# Patient Record
Sex: MALE | Race: WHITE | NOT HISPANIC OR LATINO | Employment: OTHER | URBAN - NONMETROPOLITAN AREA
[De-identification: names, ages, dates, MRNs, and addresses within clinical notes are randomized per-mention and may not be internally consistent; named-entity substitution may affect disease eponyms.]

---

## 2017-03-30 ENCOUNTER — OFFICE VISIT (OUTPATIENT)
Dept: CARDIOLOGY | Facility: CLINIC | Age: 67
End: 2017-03-30

## 2017-03-30 VITALS
HEIGHT: 67 IN | WEIGHT: 164.4 LBS | HEART RATE: 85 BPM | OXYGEN SATURATION: 93 % | SYSTOLIC BLOOD PRESSURE: 107 MMHG | DIASTOLIC BLOOD PRESSURE: 72 MMHG | BODY MASS INDEX: 25.8 KG/M2

## 2017-03-30 DIAGNOSIS — R07.9 CHEST PAIN, UNSPECIFIED TYPE: Primary | ICD-10-CM

## 2017-03-30 DIAGNOSIS — R00.2 PALPITATIONS: ICD-10-CM

## 2017-03-30 DIAGNOSIS — R06.02 SHORTNESS OF BREATH: ICD-10-CM

## 2017-03-30 PROCEDURE — 99204 OFFICE O/P NEW MOD 45 MIN: CPT | Performed by: PHYSICIAN ASSISTANT

## 2017-03-30 PROCEDURE — 93000 ELECTROCARDIOGRAM COMPLETE: CPT | Performed by: PHYSICIAN ASSISTANT

## 2017-03-30 RX ORDER — DOCUSATE SODIUM 100 MG/1
200 CAPSULE, LIQUID FILLED ORAL 2 TIMES DAILY
COMMUNITY
End: 2020-05-08

## 2017-03-30 RX ORDER — HYDROCODONE BITARTRATE AND ACETAMINOPHEN 7.5; 325 MG/1; MG/1
1 TABLET ORAL 3 TIMES DAILY
COMMUNITY

## 2017-03-30 RX ORDER — LISINOPRIL AND HYDROCHLOROTHIAZIDE 20; 12.5 MG/1; MG/1
1 TABLET ORAL DAILY
COMMUNITY
End: 2017-07-04 | Stop reason: HOSPADM

## 2017-03-30 RX ORDER — ATORVASTATIN CALCIUM 20 MG/1
20 TABLET, FILM COATED ORAL DAILY
Status: ON HOLD | COMMUNITY
End: 2017-07-04

## 2017-03-30 RX ORDER — CLOPIDOGREL BISULFATE 75 MG/1
75 TABLET ORAL DAILY
COMMUNITY
End: 2017-06-08 | Stop reason: ALTCHOICE

## 2017-03-30 NOTE — PROGRESS NOTES
Subjective   Aly Gabriel is a 67 y.o. male     Chief Complaint   Patient presents with   • Shortness of Breath     presents as a new patient        HPI    Problem list  1. 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 Hypertension  4. Dyslipidemia  5. Cervicalgia    Patient is a 67-year-old male that presents today to [facility] care. Recently was in the emergency room because of hypotension. Workup was benign but there was some concern about possibility of atrial fibrillation. Patient was discharged follow-up with cardiology and family practice as an outpatient.    He has been expressing chest pain according to the left. He has been having discomfort in his chest. Detailed history cannot be obtained. He has had shortness of breath as well but apparently no PND or orthopnea. Palpitations but no dizziness presyncope or syncope. Otherwise voices no complaints      Current Outpatient Prescriptions   Medication Sig Dispense Refill   • atorvastatin (LIPITOR) 20 MG tablet Take 20 mg by mouth Daily.     • clopidogrel (PLAVIX) 75 MG tablet Take 75 mg by mouth Daily.     • docusate sodium (COLACE) 100 MG capsule Take 200 mg by mouth 2 (Two) Times a Day.     • HYDROcodone-acetaminophen (NORCO) 5-325 MG per tablet Take 1 tablet by mouth Every 6 (Six) Hours As Needed.     • lisinopril-hydrochlorothiazide (PRINZIDE,ZESTORETIC) 20-12.5 MG per tablet Take 1 tablet by mouth 2 (Two) Times a Day.       No current facility-administered medications for this visit.        Review of patient's allergies indicates no known allergies.    Past Medical History:   Diagnosis Date   • Hyperlipidemia    • Hypertension    • Stroke        Social History     Social History   • Marital status:      Spouse name: N/A   • Number of children: N/A   • Years  "of education: N/A     Occupational History   • Not on file.     Social History Main Topics   • Smoking status: Former Smoker   • Smokeless tobacco: Never Used   • Alcohol use No   • Drug use: No   • Sexual activity: Defer     Other Topics Concern   • Not on file     Social History Narrative   • No narrative on file       @[facility]@    Review of Systems   Constitutional: Positive for fatigue.   HENT: Positive for sinus pressure.    Eyes: Positive for visual disturbance (glasses).   Respiratory: Positive for shortness of breath.    Cardiovascular: Positive for chest pain and palpitations. Leg swelling: ankles.   Gastrointestinal: Positive for constipation.   Endocrine: Negative.    Genitourinary: Negative.    Musculoskeletal: Positive for arthralgias.   Skin: Negative.    Allergic/Immunologic: Positive for environmental allergies.   Neurological: Positive for headaches.   Hematological: Bruises/bleeds easily.   Psychiatric/Behavioral: The patient is nervous/anxious.        Objective     /72 (BP Location: Left arm, Patient Position: Sitting)  Pulse 85  Ht 67\" (170.2 cm)  Wt 164 lb 6.4 oz (74.6 kg)  SpO2 93%  BMI 25.75 kg/m2    Lab Results (most recent)     None          Physical Exam   Constitutional: He is oriented to person, place, and time. He appears well-developed and well-nourished. No distress.   HENT:   Head: Normocephalic and atraumatic.   Eyes: EOM are normal. Pupils are equal, round, and reactive to light.   Neck: No JVD present.   Cardiovascular: Normal rate, regular rhythm and normal heart sounds.  Exam reveals no gallop and no friction rub.    No murmur heard.  Pulmonary/Chest: Effort normal and breath sounds normal. No respiratory distress. He has no wheezes. He has no rales. He exhibits no tenderness.   Abdominal: Soft. He exhibits no distension. There is no tenderness.   Musculoskeletal: Normal range of motion. He exhibits no edema.   Neurological: He is alert and oriented to person, " place, and time. No cranial nerve deficit.   Skin: Skin is warm and dry. No rash noted. No erythema. No pallor.   Psychiatric: He has a normal mood and affect. His behavior is normal.   Nursing note and vitals reviewed.      Procedure     ECG 12 Lead  Date/Time: 3/30/2017 1:23 PM  Performed by: NEHEMIAH BURTON  Authorized by: NEHEMIAH BURTON   Comments: Chest pain  Shortness of breath  Palpitations    EKG demonstrates sinus rhythm with artifact at 10 8 bpm, no acute ST changes                 Assessment/Plan     Problems Addressed this Visit        Cardiovascular and Mediastinum    Palpitations    Relevant Orders    ECG 12 Lead    Stress Test With Myocardial Perfusion One Day    Adult Transthoracic Echo Complete    Cardiac Event Monitor       Respiratory    Shortness of breath    Relevant Orders    ECG 12 Lead    Stress Test With Myocardial Perfusion One Day    Adult Transthoracic Echo Complete    Cardiac Event Monitor       Nervous and Auditory    Chest pain - Primary    Relevant Orders    ECG 12 Lead    Stress Test With Myocardial Perfusion One Day    Adult Transthoracic Echo Complete    Cardiac Event Monitor              Recommendations  1. Because of patient's symptoms and history of vascular disease, we will like to schedule for an ischemia assessment. Lexiscan stress test will be performed. Echocardiogram to evaluate systolic and diastolic function. I would also like to obtain a 2 week event monitor to look for any evidence of atrial fibrillation or flutter especially with history of stroke.  2. We will see him back for follow-up of above testing. Follow-up with primary as scheduled

## 2017-04-11 ENCOUNTER — APPOINTMENT (OUTPATIENT)
Dept: CARDIOLOGY | Facility: HOSPITAL | Age: 67
End: 2017-04-11

## 2017-05-01 ENCOUNTER — HOSPITAL ENCOUNTER (OUTPATIENT)
Dept: CARDIOLOGY | Facility: HOSPITAL | Age: 67
Discharge: HOME | End: 2017-05-01

## 2017-05-01 ENCOUNTER — OUTSIDE FACILITY SERVICE (OUTPATIENT)
Dept: CARDIOLOGY | Facility: CLINIC | Age: 67
End: 2017-05-01

## 2017-05-01 LAB
MAXIMAL PREDICTED HEART RATE: 152 BPM
STRESS TARGET HR: 129 BPM

## 2017-05-01 PROCEDURE — 93306 TTE W/DOPPLER COMPLETE: CPT

## 2017-05-01 PROCEDURE — 0 TECHNETIUM SESTAMIBI: Performed by: INTERNAL MEDICINE

## 2017-05-01 PROCEDURE — 78452 HT MUSCLE IMAGE SPECT MULT: CPT | Performed by: INTERNAL MEDICINE

## 2017-05-01 PROCEDURE — 93018 CV STRESS TEST I&R ONLY: CPT | Performed by: INTERNAL MEDICINE

## 2017-05-01 PROCEDURE — 93017 CV STRESS TEST TRACING ONLY: CPT

## 2017-05-01 PROCEDURE — 25010000002 REGADENOSON 0.4 MG/5ML SOLUTION: Performed by: INTERNAL MEDICINE

## 2017-05-01 PROCEDURE — 93306 TTE W/DOPPLER COMPLETE: CPT | Performed by: INTERNAL MEDICINE

## 2017-05-01 PROCEDURE — A9500 TC99M SESTAMIBI: HCPCS | Performed by: INTERNAL MEDICINE

## 2017-05-01 PROCEDURE — 78452 HT MUSCLE IMAGE SPECT MULT: CPT

## 2017-05-01 RX ADMIN — Medication 1 DOSE: at 12:00

## 2017-05-01 RX ADMIN — REGADENOSON 0.4 MG: 0.08 INJECTION, SOLUTION INTRAVENOUS at 12:00

## 2017-05-08 ENCOUNTER — OFFICE VISIT (OUTPATIENT)
Dept: CARDIOLOGY | Facility: CLINIC | Age: 67
End: 2017-05-08

## 2017-05-08 VITALS
BODY MASS INDEX: 25.68 KG/M2 | OXYGEN SATURATION: 96 % | HEART RATE: 81 BPM | SYSTOLIC BLOOD PRESSURE: 148 MMHG | HEIGHT: 67 IN | WEIGHT: 163.6 LBS | DIASTOLIC BLOOD PRESSURE: 93 MMHG

## 2017-05-08 DIAGNOSIS — R06.02 SHORTNESS OF BREATH: ICD-10-CM

## 2017-05-08 DIAGNOSIS — R07.9 CHEST PAIN, UNSPECIFIED TYPE: Primary | ICD-10-CM

## 2017-05-08 DIAGNOSIS — R94.39 ABNORMAL STRESS TEST: ICD-10-CM

## 2017-05-08 PROCEDURE — 99214 OFFICE O/P EST MOD 30 MIN: CPT | Performed by: PHYSICIAN ASSISTANT

## 2017-05-08 RX ORDER — NITROGLYCERIN 0.4 MG/1
TABLET SUBLINGUAL
Qty: 100 TABLET | Refills: 11 | Status: SHIPPED | OUTPATIENT
Start: 2017-05-08 | End: 2019-04-10 | Stop reason: SDUPTHER

## 2017-05-26 ENCOUNTER — OUTSIDE FACILITY SERVICE (OUTPATIENT)
Dept: CARDIOLOGY | Facility: CLINIC | Age: 67
End: 2017-05-26

## 2017-05-26 PROCEDURE — 93458 L HRT ARTERY/VENTRICLE ANGIO: CPT | Performed by: INTERNAL MEDICINE

## 2017-06-01 ENCOUNTER — HOSPITAL ENCOUNTER (OUTPATIENT)
Dept: CARDIOLOGY | Facility: HOSPITAL | Age: 67
Discharge: HOME | End: 2017-06-01
Attending: THORACIC SURGERY (CARDIOTHORACIC VASCULAR SURGERY)

## 2017-06-01 DIAGNOSIS — Z98.890 HISTORY OF LEFT HEART CATHETERIZATION (LHC): ICD-10-CM

## 2017-06-08 ENCOUNTER — OFFICE VISIT (OUTPATIENT)
Dept: CARDIOLOGY | Facility: CLINIC | Age: 67
End: 2017-06-08

## 2017-06-08 ENCOUNTER — TELEPHONE (OUTPATIENT)
Dept: CARDIAC SURGERY | Facility: CLINIC | Age: 67
End: 2017-06-08

## 2017-06-08 VITALS
SYSTOLIC BLOOD PRESSURE: 122 MMHG | OXYGEN SATURATION: 96 % | DIASTOLIC BLOOD PRESSURE: 80 MMHG | BODY MASS INDEX: 25.55 KG/M2 | WEIGHT: 162.8 LBS | HEART RATE: 85 BPM | HEIGHT: 67 IN

## 2017-06-08 DIAGNOSIS — I25.10 CORONARY ARTERY DISEASE INVOLVING NATIVE CORONARY ARTERY OF NATIVE HEART WITHOUT ANGINA PECTORIS: Primary | ICD-10-CM

## 2017-06-08 DIAGNOSIS — R06.00 DYSPNEA, UNSPECIFIED TYPE: ICD-10-CM

## 2017-06-08 DIAGNOSIS — R07.9 CHEST PAIN, UNSPECIFIED TYPE: ICD-10-CM

## 2017-06-08 PROCEDURE — 99214 OFFICE O/P EST MOD 30 MIN: CPT | Performed by: PHYSICIAN ASSISTANT

## 2017-06-08 RX ORDER — ISOSORBIDE MONONITRATE 30 MG/1
30 TABLET, EXTENDED RELEASE ORAL EVERY MORNING
Qty: 30 TABLET | Refills: 11 | Status: SHIPPED | OUTPATIENT
Start: 2017-06-08 | End: 2017-07-04 | Stop reason: HOSPADM

## 2017-06-08 RX ORDER — RANOLAZINE 500 MG/1
500 TABLET, EXTENDED RELEASE ORAL 2 TIMES DAILY
Qty: 60 TABLET | Refills: 6 | Status: SHIPPED | OUTPATIENT
Start: 2017-06-08 | End: 2017-07-04 | Stop reason: HOSPADM

## 2017-06-08 NOTE — TELEPHONE ENCOUNTER
S/W Dr. Anthony regarding new patient referral from Dr. Ryan's office. Patient for CAD with Possible CABG, CATH report scanned in [facility]. Dr. Ryan's office sending CATH films from [facility].   Dr. Anthony okayed New Patient Appointment for 6/19/17 at 9:15am. Dr. Ryan's office is aware that Dr. Anthony is out of the office 6/12/17 and 6/19/17 is earliest appointment available.      JE Sorenson at Dr. Rayn's office made CT Surgery aware patient is having increased chest pain and Dr. Ryan's office instructed patient to come to [facility] to be evaluated by on call CT surgeon if chest pain persisted.     Called Patient no answer left message notifying Aly of his appointment with Dr. Anthony 6/19/17.   Left Message at 10:43am 6/8/17

## 2017-06-08 NOTE — PROGRESS NOTES
Problem list     Subjective   Aly Gabriel is a 67 y.o. male     Chief Complaint   Patient presents with   • Follow-up     presents as a follow up from a cath       HPI    Problem list  1. Three-vessel coronary artery disease  1.1 cardiac catheterization May 2017 because of inferobasal, diaphragmatic, and distal lateral ischemia demonstrating severe three-vessel disease with recommendations for mechanical revascularization. Patient is referred to Dr. Ronal Anthony  2. Preserved systolic function  3. 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 Hypertension  5. Dyslipidemia    Patient is a 67-year-old male that presents back to our office for follow-up. He recently had catheterization demonstrating three-vessel disease. He is here today for follow-up and for referral to CT surgery.    Patient complains of significant discomfort. History is difficult to be obtained because of stroke and residual deficits. He complains of chest discomfort and wife states that he has been having significant discomfort. He has mild dyspnea when exerting but not progressive dyspnea. Does not complain of PND or orthopnea. He does not describe palpitations and denies dizziness presyncope or syncope. Patient describes his main complaint being chest discomfort and describes it using C[place]ino sign. Otherwise voices no complaints    Outpatient Encounter Prescriptions as of 6/8/2017   Medication Sig Dispense Refill   • aspirin 81 MG tablet Take 1 tablet by mouth Daily. 30 tablet 11   • atorvastatin (LIPITOR) 20 MG tablet Take 20 mg by mouth Daily.     • docusate sodium (COLACE) 100 MG capsule Take 200 mg by mouth 2 (Two) Times a Day.     • HYDROcodone-acetaminophen (NORCO) 5-325 MG per tablet Take 1 tablet by mouth Every 6 (Six) Hours As Needed.     • lisinopril-hydrochlorothiazide  "(PRINZIDE,ZESTORETIC) 20-12.5 MG per tablet Take 1 tablet by mouth 2 (Two) Times a Day.     • nitroglycerin (NITROSTAT) 0.4 MG SL tablet 1 under the tongue as needed for angina, may repeat q5mins for up three doses 100 tablet 11   • isosorbide mononitrate (IMDUR) 30 MG 24 hr tablet Take 1 tablet by mouth Every Morning. 30 tablet 11   • ranolazine (RANEXA) 500 MG 12 hr tablet Take 1 tablet by mouth 2 (Two) Times a Day. 60 tablet 6   • [DISCONTINUED] clopidogrel (PLAVIX) 75 MG tablet Take 75 mg by mouth Daily.       No facility-administered encounter medications on file as of 6/8/2017.        Review of patient's allergies indicates no known allergies.    Past Medical History:   Diagnosis Date   • Hyperlipidemia    • Hypertension    • Stroke        Social History     Social History   • Marital status:      Spouse name: N/A   • Number of children: N/A   • Years of education: N/A     Occupational History   • Not on file.     Social History Main Topics   • Smoking status: Former Smoker   • Smokeless tobacco: Never Used   • Alcohol use No   • Drug use: No   • Sexual activity: Defer     Other Topics Concern   • Not on file     Social History Narrative       Family History   Problem Relation Age of Onset   • Heart disease Mother    • Heart disease Father        Review of Systems   Constitutional: Negative.    HENT: Negative.    Eyes: Positive for visual disturbance.   Respiratory: Positive for shortness of breath.    Cardiovascular: Positive for chest pain. Negative for palpitations and leg swelling.   Gastrointestinal: Positive for abdominal pain.   Endocrine: Negative.    Genitourinary: Negative.    Musculoskeletal: Positive for arthralgias.   Skin: Negative.    Allergic/Immunologic: Negative.    Neurological: Negative.    Hematological: Negative.    Psychiatric/Behavioral: Negative.        Objective     /80 (BP Location: Left arm, Patient Position: Sitting)  Pulse 85  Ht 67\" (170.2 cm)  Wt 162 lb 12.8 oz " (73.8 kg)  SpO2 96%  BMI 25.5 kg/m2    Lab Results (most recent)     None          Physical Exam   Constitutional: He is oriented to person, place, and time. He appears well-developed and well-nourished. No distress.   HENT:   Head: Normocephalic and atraumatic.   Eyes: EOM are normal. Pupils are equal, round, and reactive to light.   Neck: No JVD present.   Cardiovascular: Normal rate, regular rhythm and normal heart sounds.  Exam reveals no gallop and no friction rub.    No murmur heard.  Pulmonary/Chest: Effort normal and breath sounds normal. No respiratory distress. He has no wheezes. He has no rales. He exhibits no tenderness.   Abdominal: Soft.   Musculoskeletal: Normal range of motion. He exhibits no edema.   Neurological: He is alert and oriented to person, place, and time. No cranial nerve deficit.   Skin: Skin is warm and dry. No rash noted. No erythema. No pallor.   Psychiatric: He has a normal mood and affect. His behavior is normal.   Nursing note and vitals reviewed.      Procedure   Procedures       Assessment/Plan     Problems Addressed this Visit        Cardiovascular and Mediastinum    Coronary artery disease involving native coronary artery of native heart without angina pectoris - Primary    Relevant Medications    ranolazine (RANEXA) 500 MG 12 hr tablet    isosorbide mononitrate (IMDUR) 30 MG 24 hr tablet    Other Relevant Orders    Ambulatory Referral to Cardiothoracic Surgery       Respiratory    Dyspnea    Relevant Orders    Ambulatory Referral to Cardiothoracic Surgery       Nervous and Auditory    Chest pain    Relevant Orders    Ambulatory Referral to Cardiothoracic Surgery              Recommendation  1. Because of patient's 3 vessel disease, we had a catheterization films sent to Dr. Ronal Anthony. He is referred for possible mechanical revascularization. He was mentioned that if mechanical revascularization is not amenable, possible intervention will be performed  percutaneously.  2. Because of significant discomfort, I am adding antianginal therapy with Ranexa and isosorbide. He has nitroglycerin available for chest pain and any chest pain, as discussed with the patient, not resolved by nitroglycerin, he is to go to the ER.  3. They raise concern with me today about possibility of atrial fibrillation. On first arrival at our office there was an EKG performed which demonstrated significant artifact but on review of this EKG, do not appreciate atrial fibrillation. An event monitor was ordered to ensure that patient does not have this rhythm and patient refuses. We discussed about wanting to have this done because of history of stroke and with concern of atrial fibrillation, we would like to rule out that he is having this rhythm. Again he refuses aware of the risk of stroke.  4. We will see him back for follow-up after CT evaluation and surgery. Follow-up with primary as scheduled

## 2017-06-19 ENCOUNTER — OFFICE VISIT (OUTPATIENT)
Dept: CARDIAC SURGERY | Facility: CLINIC | Age: 67
End: 2017-06-19

## 2017-06-19 VITALS
BODY MASS INDEX: 25.93 KG/M2 | WEIGHT: 165.2 LBS | HEART RATE: 79 BPM | TEMPERATURE: 97.6 F | HEIGHT: 67 IN | SYSTOLIC BLOOD PRESSURE: 121 MMHG | DIASTOLIC BLOOD PRESSURE: 75 MMHG | OXYGEN SATURATION: 95 %

## 2017-06-19 DIAGNOSIS — I25.10 CORONARY ARTERY DISEASE INVOLVING NATIVE CORONARY ARTERY OF NATIVE HEART WITHOUT ANGINA PECTORIS: Primary | ICD-10-CM

## 2017-06-19 DIAGNOSIS — I65.23 CAROTID STENOSIS, BILATERAL: Primary | ICD-10-CM

## 2017-06-19 PROCEDURE — 99205 OFFICE O/P NEW HI 60 MIN: CPT | Performed by: THORACIC SURGERY (CARDIOTHORACIC VASCULAR SURGERY)

## 2017-06-19 NOTE — PROGRESS NOTES
06/19/2017  Patient Information  Aly Gabriel                                                                                          [address]   [facility] [address]   1950  'PCP/Referring Physician'  Félix Blair MD  [phone]  Delta Shah PA  [phone]  Chief Complaint   Patient presents with   • Coronary Artery Disease     Referred by Dr. Ryan for possible CABG       History of Present Illness:  This patient was referred to me to evaluate for coronary bypass surgery.  This gentleman has had some substernal chest pain and subsequent positive stress test.  Catheterization demonstrated multivessel coronary disease.  It should be noted he had a stroke approximately 2-1/2 years ago in the [place] which resulted in some dysarthria of speech and residual right arm paralysis.  He has had a subsequent right sided carotid endarterectomy and stenting of the left internal carotid endarterectomy elsewhere.  His wife accompanies him today and she provides additional history information.  At this point the patient is pain-free and is breathing unlabored.  He would like to discuss potential coronary bypass surgery.      Patient Active Problem List   Diagnosis   • Chest pain   • Shortness of breath   • Palpitations   • Abnormal stress test   • Dyspnea   • Coronary artery disease involving native coronary artery of native heart without angina pectoris     Past Medical History:   Diagnosis Date   • Arthritis    • Coronary artery disease    • Hyperlipidemia    • Hypertension    • Stroke      Past Surgical History:   Procedure Laterality Date   • ANKLE SURGERY     • CAROTID ARTERY ANGIOPLASTY     • CAROTID ENDARTERECTOMY Right 2014       Current Outpatient Prescriptions:   •  aspirin 81 MG tablet, Take 1 tablet by mouth Daily., Disp: 30 tablet, Rfl: 11  •  atorvastatin (LIPITOR) 20 MG tablet, Take 20 mg by mouth Daily., Disp: , Rfl:   •  docusate sodium (COLACE) 100 MG capsule, Take 200 mg by mouth 2  (Two) Times a Day., Disp: , Rfl:   •  HYDROcodone-acetaminophen (NORCO) 5-325 MG per tablet, Take 1 tablet by mouth Every 6 (Six) Hours As Needed., Disp: , Rfl:   •  isosorbide mononitrate (IMDUR) 30 MG 24 hr tablet, Take 1 tablet by mouth Every Morning., Disp: 30 tablet, Rfl: 11  •  lisinopril-hydrochlorothiazide (PRINZIDE,ZESTORETIC) 20-12.5 MG per tablet, Take 1 tablet by mouth 2 (Two) Times a Day., Disp: , Rfl:   •  nitroglycerin (NITROSTAT) 0.4 MG SL tablet, 1 under the tongue as needed for angina, may repeat q5mins for up three doses, Disp: 100 tablet, Rfl: 11  •  ranolazine (RANEXA) 500 MG 12 hr tablet, Take 1 tablet by mouth 2 (Two) Times a Day., Disp: 60 tablet, Rfl: 6  No Known Allergies  Social History     Social History   • Marital status:      Spouse name: N/A   • Number of children: 0   • Years of education: N/A     Occupational History   • [employer] Retired     Social History Main Topics   • Smoking status: Former Smoker     Packs/day: 2.00     Years: 30.00     Types: Cigarettes     Quit date: 6/19/2014   • Smokeless tobacco: Never Used   • Alcohol use No   • Drug use: No   • Sexual activity: Defer     Other Topics Concern   • Not on file     Social History Narrative     Family History   Problem Relation Age of Onset   • Heart disease Mother    • Heart disease Father      Review of Systems   Constitution: Negative for chills, fever, malaise/fatigue, night sweats and weight loss.   HENT: Negative for headaches, hearing loss, odynophagia and sore throat.    Cardiovascular: Positive for chest pain and dyspnea on exertion. Negative for leg swelling, orthopnea and palpitations.   Respiratory: Positive for cough. Negative for hemoptysis.    Endocrine: Negative for cold intolerance, heat intolerance, polydipsia, polyphagia and polyuria.   Hematologic/Lymphatic: Bruises/bleeds easily.   Skin: Negative for itching and rash.   Musculoskeletal: Positive for back pain. Negative for joint pain, joint swelling and  "myalgias.   Gastrointestinal: Negative for abdominal pain, constipation, diarrhea, hematemesis, hematochezia, melena, nausea and vomiting.   Genitourinary: Positive for frequency. Negative for dysuria and hematuria.   Neurological: Positive for dizziness, light-headedness and loss of balance. Negative for focal weakness, numbness and seizures.   Psychiatric/Behavioral: Negative for depression and suicidal ideas. The patient is not nervous/anxious.    All other systems reviewed and are negative.    Vitals:    06/19/17 0922   BP: 121/75   BP Location: Right arm   Patient Position: Sitting   Pulse: 79   Temp: 97.6 °F (36.4 °C)   SpO2: 95%   Weight: 165 lb 3.2 oz (74.9 kg)   Height: 67\" (170.2 cm)      Physical Exam   CONSTITUTIONAL:  Well dressed, Well nourished, No acute distress  EYES: Sclera clean, Anicteric, Pupils equal  ENT: No nasal deviation, Trachea midline  NECK: No neck masses, Supple  LUNGS: No wheezing, Cough, non-congested  HEART: No rubs, No murmurs  GI:  Soft, non-distended, No masses, Non tender  to palpation.  Bowel sounds normal.  NEURO: Patient is ambulatory without difficulty but has minimal movement in the right arm and has appeared to be a slight amount of contracture.  He also has dysarthria.  PSYCHIATRIC: Oriented to person, place and time, No memory deficits, Mood appropriate  VASCULAR: No carotid bruits, Posterior tibial pulses palpable bilaterally, Femoral pulses palpable and symmetric    Labs/Imaging:   I have reviewed the cardiac catheterization report and images and the patient's wife is here to provide additional historical information as the patient himself has difficulty speaking secondary to his stroke.    Assessment/Plan:   Patient with multivessel coronary disease.  Although the targets are small, I think they are adequate for revascularization as he has had continued angina.  His surgery puts him at an increased risk of stroke because he has had a previous stroke with dysarthria " and residual right arm paralysis.  However, the patient is ambulatory and otherwise quite functional, his speech is somewhat hard to understand.  They are very agreeable to proceed with surgery.  They know it carries with it a risk of stroke, bleeding, infection, and death and no guarantees are made as to outcome.  I would like to obtain a CT angiogram of the carotids as soon as possible to evaluate the previous left carotid stent on the previous right carotid endarterectomy before proceeding with coronary surgery.  They are agreeable to this.  We would like to obtain a CT scan within the next 48 hours and surgery within the next 3-4 days following that.        Patient Active Problem List   Diagnosis   • Chest pain   • Shortness of breath   • Palpitations   • Abnormal stress test   • Dyspnea   • Coronary artery disease involving native coronary artery of native heart without angina pectoris     Signed by: Ronal Anthony M.D.    6/19/17    CC:  MD Félix Sofia MD Debbie Moore, , editing for Ronal Anthony M.D.    I, Ronal Anthony MD, have read and agree with the editing done by Lita San, .

## 2017-06-21 DIAGNOSIS — I25.119 CORONARY ARTERY DISEASE INVOLVING NATIVE CORONARY ARTERY OF NATIVE HEART WITH ANGINA PECTORIS (HCC): Primary | ICD-10-CM

## 2017-06-22 ENCOUNTER — HOSPITAL ENCOUNTER (OUTPATIENT)
Dept: CT IMAGING | Facility: HOSPITAL | Age: 67
Discharge: HOME | End: 2017-06-22
Admitting: PHYSICIAN ASSISTANT

## 2017-06-22 ENCOUNTER — PREP FOR SURGERY (OUTPATIENT)
Dept: OTHER | Facility: HOSPITAL | Age: 67
End: 2017-06-22

## 2017-06-22 DIAGNOSIS — I25.10 CAD IN NATIVE ARTERY: Primary | ICD-10-CM

## 2017-06-22 DIAGNOSIS — I65.23 CAROTID STENOSIS, BILATERAL: ICD-10-CM

## 2017-06-22 PROCEDURE — 0 IOPAMIDOL PER 1 ML: Performed by: PHYSICIAN ASSISTANT

## 2017-06-22 PROCEDURE — 70498 CT ANGIOGRAPHY NECK: CPT

## 2017-06-22 RX ORDER — CHLORHEXIDINE GLUCONATE 0.12 MG/ML
15 RINSE ORAL ONCE
Status: CANCELLED | OUTPATIENT
Start: 2017-06-22 | End: 2017-06-22

## 2017-06-22 RX ORDER — ASPIRIN 325 MG
325 TABLET ORAL NIGHTLY
Status: CANCELLED | OUTPATIENT
Start: 2017-06-22 | End: 2017-06-23

## 2017-06-22 RX ORDER — ACETAMINOPHEN 325 MG/1
650 TABLET ORAL EVERY 4 HOURS PRN
Status: CANCELLED | OUTPATIENT
Start: 2017-06-22

## 2017-06-22 RX ORDER — CHLORHEXIDINE GLUCONATE 500 MG/1
1 CLOTH TOPICAL EVERY 12 HOURS PRN
Status: CANCELLED | OUTPATIENT
Start: 2017-06-22

## 2017-06-22 RX ORDER — NITROGLYCERIN 0.4 MG/1
0.4 TABLET SUBLINGUAL
Status: CANCELLED | OUTPATIENT
Start: 2017-06-22

## 2017-06-22 RX ADMIN — IOPAMIDOL 100 ML: 755 INJECTION, SOLUTION INTRAVENOUS at 13:00

## 2017-06-27 ENCOUNTER — APPOINTMENT (OUTPATIENT)
Dept: PREADMISSION TESTING | Facility: HOSPITAL | Age: 67
End: 2017-06-27

## 2017-06-27 ENCOUNTER — HOSPITAL ENCOUNTER (OUTPATIENT)
Dept: GENERAL RADIOLOGY | Facility: HOSPITAL | Age: 67
Discharge: HOME | End: 2017-06-27
Admitting: PHYSICIAN ASSISTANT

## 2017-06-27 VITALS — HEIGHT: 67 IN | WEIGHT: 162.04 LBS | BODY MASS INDEX: 25.43 KG/M2

## 2017-06-27 DIAGNOSIS — I25.10 CAD IN NATIVE ARTERY: ICD-10-CM

## 2017-06-27 LAB
ABO GROUP BLD: NORMAL
ALBUMIN SERPL-MCNC: 4.5 G/DL (ref 3.2–4.8)
ALBUMIN/GLOB SERPL: 1.7 G/DL (ref 1.5–2.5)
ALP SERPL-CCNC: 70 U/L (ref 25–100)
ALT SERPL W P-5'-P-CCNC: 11 U/L (ref 7–40)
ANION GAP SERPL CALCULATED.3IONS-SCNC: 2 MMOL/L (ref 3–11)
AST SERPL-CCNC: 14 U/L (ref 0–33)
BASOPHILS # BLD AUTO: 0.02 10*3/MM3 (ref 0–0.2)
BASOPHILS NFR BLD AUTO: 0.2 % (ref 0–1)
BILIRUB SERPL-MCNC: 0.4 MG/DL (ref 0.3–1.2)
BLD GP AB SCN SERPL QL: NEGATIVE
BUN BLD-MCNC: 14 MG/DL (ref 9–23)
BUN/CREAT SERPL: 14 (ref 7–25)
CALCIUM SPEC-SCNC: 10.3 MG/DL (ref 8.7–10.4)
CHLORIDE SERPL-SCNC: 107 MMOL/L (ref 99–109)
CO2 SERPL-SCNC: 28 MMOL/L (ref 20–31)
CREAT BLD-MCNC: 1 MG/DL (ref 0.6–1.3)
DEPRECATED RDW RBC AUTO: 56.7 FL (ref 37–54)
EOSINOPHIL # BLD AUTO: 0.17 10*3/MM3 (ref 0–0.3)
EOSINOPHIL NFR BLD AUTO: 1.4 % (ref 0–3)
ERYTHROCYTE [DISTWIDTH] IN BLOOD BY AUTOMATED COUNT: 14.9 % (ref 11.3–14.5)
GFR SERPL CREATININE-BSD FRML MDRD: 74 ML/MIN/1.73
GLOBULIN UR ELPH-MCNC: 2.7 GM/DL
GLUCOSE BLD-MCNC: 80 MG/DL (ref 70–100)
HCT VFR BLD AUTO: 44.9 % (ref 38.9–50.9)
HGB BLD-MCNC: 14.8 G/DL (ref 13.1–17.5)
IMM GRANULOCYTES # BLD: 0.03 10*3/MM3 (ref 0–0.03)
IMM GRANULOCYTES NFR BLD: 0.2 % (ref 0–0.6)
INR PPP: 0.97
LYMPHOCYTES # BLD AUTO: 3.93 10*3/MM3 (ref 0.6–4.8)
LYMPHOCYTES NFR BLD AUTO: 32.4 % (ref 24–44)
MAGNESIUM SERPL-MCNC: 2.1 MG/DL (ref 1.3–2.7)
MCH RBC QN AUTO: 34.1 PG (ref 27–31)
MCHC RBC AUTO-ENTMCNC: 33 G/DL (ref 32–36)
MCV RBC AUTO: 103.5 FL (ref 80–99)
MONOCYTES # BLD AUTO: 1.14 10*3/MM3 (ref 0–1)
MONOCYTES NFR BLD AUTO: 9.4 % (ref 0–12)
NEUTROPHILS # BLD AUTO: 6.84 10*3/MM3 (ref 1.5–8.3)
NEUTROPHILS NFR BLD AUTO: 56.4 % (ref 41–71)
PA ADP PRP-ACNC: 232 PRU
PLATELET # BLD AUTO: 335 10*3/MM3 (ref 150–450)
PMV BLD AUTO: 9.1 FL (ref 6–12)
POTASSIUM BLD-SCNC: 4.3 MMOL/L (ref 3.5–5.5)
PROT SERPL-MCNC: 7.2 G/DL (ref 5.7–8.2)
PROTHROMBIN TIME: 10.6 SECONDS (ref 9.6–11.5)
RBC # BLD AUTO: 4.34 10*6/MM3 (ref 4.2–5.76)
RH BLD: POSITIVE
SODIUM BLD-SCNC: 137 MMOL/L (ref 132–146)
WBC NRBC COR # BLD: 12.13 10*3/MM3 (ref 3.5–10.8)

## 2017-06-27 PROCEDURE — 86920 COMPATIBILITY TEST SPIN: CPT

## 2017-06-27 RX ORDER — CHLORHEXIDINE GLUCONATE 500 MG/1
1 CLOTH TOPICAL EVERY 12 HOURS PRN
Status: ACTIVE | OUTPATIENT
Start: 2017-06-27

## 2017-06-28 ENCOUNTER — ANESTHESIA EVENT (OUTPATIENT)
Dept: PERIOP | Facility: HOSPITAL | Age: 67
End: 2017-06-28

## 2017-06-28 ENCOUNTER — APPOINTMENT (OUTPATIENT)
Dept: GENERAL RADIOLOGY | Facility: HOSPITAL | Age: 67
End: 2017-06-28

## 2017-06-28 ENCOUNTER — ANESTHESIA (OUTPATIENT)
Dept: PERIOP | Facility: HOSPITAL | Age: 67
End: 2017-06-28

## 2017-06-28 ENCOUNTER — HOSPITAL ENCOUNTER (INPATIENT)
Facility: HOSPITAL | Age: 67
LOS: 6 days | Discharge: HOME | End: 2017-07-04
Attending: THORACIC SURGERY (CARDIOTHORACIC VASCULAR SURGERY) | Admitting: THORACIC SURGERY (CARDIOTHORACIC VASCULAR SURGERY)

## 2017-06-28 DIAGNOSIS — I25.10 CAD IN NATIVE ARTERY: ICD-10-CM

## 2017-06-28 DIAGNOSIS — R13.10 DYSPHAGIA, UNSPECIFIED TYPE: ICD-10-CM

## 2017-06-28 DIAGNOSIS — Z74.09 IMPAIRED FUNCTIONAL MOBILITY, BALANCE, GAIT, AND ENDURANCE: Primary | ICD-10-CM

## 2017-06-28 PROBLEM — E78.5 HYPERLIPIDEMIA: Status: ACTIVE | Noted: 2017-06-28

## 2017-06-28 PROBLEM — IMO0002 COMBINED RECEPTIVE AND EXPRESSIVE APHASIA DUE TO CEREBROVASCULAR ACCIDENT: Status: ACTIVE | Noted: 2017-06-28

## 2017-06-28 PROBLEM — G89.29 CHRONIC PAIN: Status: ACTIVE | Noted: 2017-06-28

## 2017-06-28 PROBLEM — Z87.891 FORMER SMOKER: Status: ACTIVE | Noted: 2017-06-28

## 2017-06-28 PROBLEM — I10 HYPERTENSION: Status: ACTIVE | Noted: 2017-06-28

## 2017-06-28 LAB
ABO GROUP BLD: NORMAL
ACT BLD: 103 SECONDS (ref 82–152)
ACT BLD: 114 SECONDS (ref 82–152)
ACT BLD: 120 SECONDS (ref 82–152)
ACT BLD: 560 SECONDS (ref 82–152)
ACT BLD: 588 SECONDS (ref 82–152)
ACT BLD: 643 SECONDS (ref 82–152)
ACT BLD: 978 SECONDS (ref 82–152)
ALBUMIN SERPL-MCNC: 3.2 G/DL (ref 3.2–4.8)
ALBUMIN SERPL-MCNC: 4 G/DL (ref 3.2–4.8)
ANION GAP SERPL CALCULATED.3IONS-SCNC: 10 MMOL/L (ref 3–11)
ANION GAP SERPL CALCULATED.3IONS-SCNC: 4 MMOL/L (ref 3–11)
APTT PPP: 27.1 SECONDS (ref 24–31)
ARTERIAL PATENCY WRIST A: ABNORMAL
ARTERIAL PATENCY WRIST A: ABNORMAL
ARTERIAL PATENCY WRIST A: NORMAL
ATMOSPHERIC PRESS: 760 MMHG
ATMOSPHERIC PRESS: ABNORMAL MMHG
ATMOSPHERIC PRESS: ABNORMAL MMHG
BACTERIA UR QL AUTO: NORMAL /HPF
BASE EXCESS BLDA CALC-SCNC: -1 MMOL/L (ref -5–5)
BASE EXCESS BLDA CALC-SCNC: -1 MMOL/L (ref -5–5)
BASE EXCESS BLDA CALC-SCNC: -1.1 MMOL/L (ref 0–2)
BASE EXCESS BLDA CALC-SCNC: -1.3 MMOL/L
BASE EXCESS BLDA CALC-SCNC: -2 MMOL/L (ref -5–5)
BASE EXCESS BLDA CALC-SCNC: -2 MMOL/L (ref -5–5)
BASE EXCESS BLDA CALC-SCNC: -2.9 MMOL/L (ref 0–2)
BASE EXCESS BLDA CALC-SCNC: -3 MMOL/L (ref -5–5)
BASE EXCESS BLDA CALC-SCNC: -5 MMOL/L (ref -5–5)
BDY SITE: ABNORMAL
BDY SITE: ABNORMAL
BDY SITE: NORMAL
BILIRUB UR QL STRIP: NEGATIVE
BUN BLD-MCNC: 13 MG/DL (ref 9–23)
BUN BLD-MCNC: 15 MG/DL (ref 9–23)
BUN/CREAT SERPL: 13 (ref 7–25)
BUN/CREAT SERPL: 13.6 (ref 7–25)
CA-I BLDA-SCNC: 1.15 MMOL/L (ref 1.2–1.32)
CA-I BLDA-SCNC: 1.16 MMOL/L (ref 1.2–1.32)
CA-I BLDA-SCNC: 1.21 MMOL/L (ref 1.2–1.32)
CA-I BLDA-SCNC: 1.21 MMOL/L (ref 1.2–1.32)
CA-I BLDA-SCNC: 1.27 MMOL/L (ref 1.2–1.32)
CA-I BLDA-SCNC: 1.3 MMOL/L (ref 1.2–1.32)
CA-I SERPL ISE-MCNC: 1.35 MMOL/L (ref 1.12–1.32)
CALCIUM SPEC-SCNC: 10.4 MG/DL (ref 8.7–10.4)
CALCIUM SPEC-SCNC: 9.5 MG/DL (ref 8.7–10.4)
CHLORIDE SERPL-SCNC: 107 MMOL/L (ref 99–109)
CHLORIDE SERPL-SCNC: 110 MMOL/L (ref 99–109)
CLARITY UR: CLEAR
CO2 BLDA-SCNC: 23 MMOL/L (ref 24–29)
CO2 BLDA-SCNC: 23 MMOL/L (ref 24–29)
CO2 BLDA-SCNC: 24 MMOL/L (ref 24–29)
CO2 BLDA-SCNC: 24.2 MMOL/L (ref 22–33)
CO2 BLDA-SCNC: 25 MMOL/L (ref 24–29)
CO2 BLDA-SCNC: 25.7 MMOL/L (ref 22–33)
CO2 BLDA-SCNC: 26 MMOL/L (ref 24–29)
CO2 BLDA-SCNC: 26.4 MMOL/L (ref 23–27)
CO2 BLDA-SCNC: 28 MMOL/L (ref 24–29)
CO2 SERPL-SCNC: 23 MMOL/L (ref 20–31)
CO2 SERPL-SCNC: 23 MMOL/L (ref 20–31)
COHGB MFR BLD: 1.2 % (ref 0–2)
COHGB MFR BLD: 1.3 %
COHGB MFR BLD: 2.7 % (ref 0–2)
COLOR UR: YELLOW
CREAT BLD-MCNC: 1 MG/DL (ref 0.6–1.3)
CREAT BLD-MCNC: 1.1 MG/DL (ref 0.6–1.3)
DEPRECATED RDW RBC AUTO: 54.2 FL (ref 37–54)
DEPRECATED RDW RBC AUTO: 57.2 FL (ref 37–54)
ERYTHROCYTE [DISTWIDTH] IN BLOOD BY AUTOMATED COUNT: 14.7 % (ref 11.3–14.5)
ERYTHROCYTE [DISTWIDTH] IN BLOOD BY AUTOMATED COUNT: 14.9 % (ref 11.3–14.5)
GFR SERPL CREATININE-BSD FRML MDRD: 67 ML/MIN/1.73
GFR SERPL CREATININE-BSD FRML MDRD: 74 ML/MIN/1.73
GLUCOSE BLD-MCNC: 132 MG/DL (ref 70–100)
GLUCOSE BLD-MCNC: 138 MG/DL (ref 70–100)
GLUCOSE BLDC GLUCOMTR-MCNC: 106 MG/DL (ref 70–130)
GLUCOSE BLDC GLUCOMTR-MCNC: 133 MG/DL (ref 70–130)
GLUCOSE BLDC GLUCOMTR-MCNC: 133 MG/DL (ref 70–130)
GLUCOSE BLDC GLUCOMTR-MCNC: 138 MG/DL (ref 70–130)
GLUCOSE BLDC GLUCOMTR-MCNC: 139 MG/DL (ref 70–130)
GLUCOSE BLDC GLUCOMTR-MCNC: 141 MG/DL (ref 70–130)
GLUCOSE BLDC GLUCOMTR-MCNC: 148 MG/DL (ref 70–130)
GLUCOSE BLDC GLUCOMTR-MCNC: 152 MG/DL (ref 70–130)
GLUCOSE BLDC GLUCOMTR-MCNC: 153 MG/DL (ref 70–130)
GLUCOSE BLDC GLUCOMTR-MCNC: 153 MG/DL (ref 70–130)
GLUCOSE UR STRIP-MCNC: NEGATIVE MG/DL
HCO3 BLDA-SCNC: 21.9 MMOL/L (ref 22–26)
HCO3 BLDA-SCNC: 22 MMOL/L (ref 22–26)
HCO3 BLDA-SCNC: 22.6 MMOL/L (ref 22–26)
HCO3 BLDA-SCNC: 22.8 MMOL/L (ref 20–26)
HCO3 BLDA-SCNC: 23.9 MMOL/L (ref 22–26)
HCO3 BLDA-SCNC: 24.3 MMOL/L (ref 20–26)
HCO3 BLDA-SCNC: 24.4 MMOL/L (ref 22–26)
HCO3 BLDA-SCNC: 24.9 MMOL/L
HCO3 BLDA-SCNC: 26.4 MMOL/L (ref 22–26)
HCT VFR BLD AUTO: 25.6 % (ref 38.9–50.9)
HCT VFR BLD AUTO: 26.6 % (ref 38.9–50.9)
HCT VFR BLD AUTO: 33.8 % (ref 38.9–50.9)
HCT VFR BLD CALC: 27 %
HCT VFR BLD CALC: 27.4 %
HCT VFR BLD CALC: 37.2 %
HCT VFR BLDA CALC: 24 % (ref 38–51)
HCT VFR BLDA CALC: 26 % (ref 38–51)
HCT VFR BLDA CALC: 26 % (ref 38–51)
HCT VFR BLDA CALC: 27 % (ref 38–51)
HCT VFR BLDA CALC: 36 % (ref 38–51)
HCT VFR BLDA CALC: 42 % (ref 38–51)
HGB BLD-MCNC: 11 G/DL (ref 13.1–17.5)
HGB BLD-MCNC: 8.5 G/DL (ref 13.1–17.5)
HGB BLD-MCNC: 8.7 G/DL (ref 13.1–17.5)
HGB BLDA-MCNC: 12.1 G/DL (ref 13.5–17.5)
HGB BLDA-MCNC: 12.2 G/DL (ref 12–17)
HGB BLDA-MCNC: 14.3 G/DL (ref 12–17)
HGB BLDA-MCNC: 8.2 G/DL (ref 12–17)
HGB BLDA-MCNC: 8.8 G/DL
HGB BLDA-MCNC: 8.8 G/DL (ref 12–17)
HGB BLDA-MCNC: 8.8 G/DL (ref 12–17)
HGB BLDA-MCNC: 8.9 G/DL (ref 13.5–17.5)
HGB BLDA-MCNC: 9.2 G/DL (ref 12–17)
HGB UR QL STRIP.AUTO: ABNORMAL
HOROWITZ INDEX BLD+IHG-RTO: 100 %
HOROWITZ INDEX BLD+IHG-RTO: 30 %
HOROWITZ INDEX BLD+IHG-RTO: 40 %
HYALINE CASTS UR QL AUTO: NORMAL /LPF
INR PPP: 1.14
KETONES UR QL STRIP: NEGATIVE
LEUKOCYTE ESTERASE UR QL STRIP.AUTO: NEGATIVE
MAGNESIUM SERPL-MCNC: 3.7 MG/DL (ref 1.3–2.7)
MAGNESIUM SERPL-MCNC: 5.3 MG/DL (ref 1.3–2.7)
MCH RBC QN AUTO: 33.5 PG (ref 27–31)
MCH RBC QN AUTO: 33.8 PG (ref 27–31)
MCHC RBC AUTO-ENTMCNC: 32.5 G/DL (ref 32–36)
MCHC RBC AUTO-ENTMCNC: 33.2 G/DL (ref 32–36)
MCV RBC AUTO: 100.8 FL (ref 80–99)
MCV RBC AUTO: 104 FL (ref 80–99)
METHGB BLD QL: 1.2 % (ref 0–1.5)
METHGB BLD QL: 1.4 % (ref 0–1.5)
METHGB BLD QL: 1.6 %
MODALITY: ABNORMAL
MODALITY: ABNORMAL
MODALITY: NORMAL
NITRITE UR QL STRIP: NEGATIVE
OXYHGB MFR BLDV: 94 % (ref 94–99)
OXYHGB MFR BLDV: 94.9 % (ref 94–99)
OXYHGB MFR BLDV: 95.8 % (ref 94–99)
PCO2 BLDA: 37 MM HG (ref 35–45)
PCO2 BLDA: 37.2 MM HG (ref 35–45)
PCO2 BLDA: 43.6 MM HG (ref 35–48)
PCO2 BLDA: 43.6 MM HG (ref 35–48)
PCO2 BLDA: 46 MM HG (ref 35–45)
PCO2 BLDA: 46 MM HG (ref 35–45)
PCO2 BLDA: 48.9 MM HG (ref 35–45)
PCO2 BLDA: 50.1 MM HG
PCO2 BLDA: 58.3 MM HG (ref 35–45)
PH BLDA: 7.26 PH UNITS (ref 7.35–7.6)
PH BLDA: 7.26 PH UNITS (ref 7.35–7.6)
PH BLDA: 7.3 PH UNITS
PH BLDA: 7.32 PH UNITS (ref 7.35–7.6)
PH BLDA: 7.33 PH UNITS (ref 7.35–7.45)
PH BLDA: 7.33 PH UNITS (ref 7.35–7.6)
PH BLDA: 7.36 PH UNITS (ref 7.35–7.45)
PH BLDA: 7.38 PH UNITS (ref 7.35–7.6)
PH BLDA: 7.39 PH UNITS (ref 7.35–7.6)
PH UR STRIP.AUTO: 5.5 [PH] (ref 5–8)
PHOSPHATE SERPL-MCNC: 3.5 MG/DL (ref 2.4–5.1)
PHOSPHATE SERPL-MCNC: 3.6 MG/DL (ref 2.4–5.1)
PLATELET # BLD AUTO: 206 10*3/MM3 (ref 150–450)
PLATELET # BLD AUTO: 235 10*3/MM3 (ref 150–450)
PMV BLD AUTO: 8.6 FL (ref 6–12)
PMV BLD AUTO: 9 FL (ref 6–12)
PO2 BLDA: 111 MM HG
PO2 BLDA: 204 MMHG (ref 80–105)
PO2 BLDA: 254 MM HG (ref 83–108)
PO2 BLDA: 325 MMHG (ref 80–105)
PO2 BLDA: 367 MMHG (ref 80–105)
PO2 BLDA: 405 MMHG (ref 80–105)
PO2 BLDA: 493 MMHG (ref 80–105)
PO2 BLDA: 83 MMHG (ref 80–105)
PO2 BLDA: 87.6 MM HG (ref 83–108)
POTASSIUM BLD-SCNC: 4.2 MMOL/L (ref 3.5–5.5)
POTASSIUM BLD-SCNC: 4.8 MMOL/L (ref 3.5–5.5)
POTASSIUM BLDA-SCNC: 4.1 MMOL/L (ref 3.5–4.9)
POTASSIUM BLDA-SCNC: 4.5 MMOL/L (ref 3.5–4.9)
POTASSIUM BLDA-SCNC: 4.7 MMOL/L (ref 3.5–4.9)
POTASSIUM BLDA-SCNC: 5.3 MMOL/L (ref 3.5–4.9)
PROT UR QL STRIP: NEGATIVE
PROTHROMBIN TIME: 12.5 SECONDS (ref 9.6–11.5)
RBC # BLD AUTO: 2.54 10*6/MM3 (ref 4.2–5.76)
RBC # BLD AUTO: 3.25 10*6/MM3 (ref 4.2–5.76)
RBC # UR: NORMAL /HPF
REF LAB TEST METHOD: NORMAL
RH BLD: POSITIVE
SAO2 % BLDA: 100 % (ref 95–98)
SAO2 % BLDA: 96 % (ref 95–98)
SAO2 % BLDCOA: 94.9 %
SODIUM BLD-SCNC: 137 MMOL/L (ref 132–146)
SODIUM BLD-SCNC: 140 MMOL/L (ref 132–146)
SODIUM BLDA-SCNC: 132 MMOL/L (ref 138–146)
SODIUM BLDA-SCNC: 133 MMOL/L (ref 138–146)
SODIUM BLDA-SCNC: 134 MMOL/L (ref 138–146)
SODIUM BLDA-SCNC: 136 MMOL/L (ref 138–146)
SODIUM BLDA-SCNC: 137 MMOL/L (ref 138–146)
SODIUM BLDA-SCNC: 138 MMOL/L (ref 138–146)
SP GR UR STRIP: 1.01 (ref 1–1.03)
SQUAMOUS #/AREA URNS HPF: NORMAL /HPF
UROBILINOGEN UR QL STRIP: ABNORMAL
WBC NRBC COR # BLD: 16.75 10*3/MM3 (ref 3.5–10.8)
WBC NRBC COR # BLD: 20.27 10*3/MM3 (ref 3.5–10.8)
WBC UR QL AUTO: NORMAL /HPF

## 2017-06-28 PROCEDURE — 33533 CABG ARTERIAL SINGLE: CPT | Performed by: THORACIC SURGERY (CARDIOTHORACIC VASCULAR SURGERY)

## 2017-06-28 PROCEDURE — 84295 ASSAY OF SERUM SODIUM: CPT

## 2017-06-28 PROCEDURE — 25810000003 DEXTROSE 5 % WITH KCL 20 MEQ 20-5 MEQ/L-% SOLUTION: Performed by: PHYSICIAN ASSISTANT

## 2017-06-28 PROCEDURE — 25010000002 AMIODARONE IN DEXTROSE 5% 360-4.14 MG/200ML-% SOLUTION: Performed by: THORACIC SURGERY (CARDIOTHORACIC VASCULAR SURGERY)

## 2017-06-28 PROCEDURE — 25010000002 PROPOFOL 10 MG/ML EMULSION: Performed by: ANESTHESIOLOGY

## 2017-06-28 PROCEDURE — 25010000002 PHENYLEPHRINE PER 1 ML: Performed by: ANESTHESIOLOGY

## 2017-06-28 PROCEDURE — 5A1221Z PERFORMANCE OF CARDIAC OUTPUT, CONTINUOUS: ICD-10-PCS | Performed by: THORACIC SURGERY (CARDIOTHORACIC VASCULAR SURGERY)

## 2017-06-28 PROCEDURE — 85018 HEMOGLOBIN: CPT | Performed by: THORACIC SURGERY (CARDIOTHORACIC VASCULAR SURGERY)

## 2017-06-28 PROCEDURE — 25010000002 PROTAMINE SULFATE PER 10 MG: Performed by: ANESTHESIOLOGY

## 2017-06-28 PROCEDURE — 83735 ASSAY OF MAGNESIUM: CPT | Performed by: PHYSICIAN ASSISTANT

## 2017-06-28 PROCEDURE — 86901 BLOOD TYPING SEROLOGIC RH(D): CPT

## 2017-06-28 PROCEDURE — P9041 ALBUMIN (HUMAN),5%, 50ML: HCPCS | Performed by: NURSE PRACTITIONER

## 2017-06-28 PROCEDURE — 25010000002 POTASSIUM CHLORIDE PER 2 MEQ OF POTASSIUM

## 2017-06-28 PROCEDURE — 25010000002 AMIODARONE PER 30 MG

## 2017-06-28 PROCEDURE — 33519 CABG ARTERY-VEIN THREE: CPT | Performed by: PHYSICIAN ASSISTANT

## 2017-06-28 PROCEDURE — C1729 CATH, DRAINAGE: HCPCS | Performed by: THORACIC SURGERY (CARDIOTHORACIC VASCULAR SURGERY)

## 2017-06-28 PROCEDURE — 25010000002 HEPARIN (PORCINE) PER 1000 UNITS: Performed by: THORACIC SURGERY (CARDIOTHORACIC VASCULAR SURGERY)

## 2017-06-28 PROCEDURE — 021209W BYPASS CORONARY ARTERY, THREE ARTERIES FROM AORTA WITH AUTOLOGOUS VENOUS TISSUE, OPEN APPROACH: ICD-10-PCS | Performed by: THORACIC SURGERY (CARDIOTHORACIC VASCULAR SURGERY)

## 2017-06-28 PROCEDURE — 82805 BLOOD GASES W/O2 SATURATION: CPT | Performed by: THORACIC SURGERY (CARDIOTHORACIC VASCULAR SURGERY)

## 2017-06-28 PROCEDURE — 94799 UNLISTED PULMONARY SVC/PX: CPT

## 2017-06-28 PROCEDURE — P9035 PLATELET PHERES LEUKOREDUCED: HCPCS

## 2017-06-28 PROCEDURE — 85730 THROMBOPLASTIN TIME PARTIAL: CPT | Performed by: PHYSICIAN ASSISTANT

## 2017-06-28 PROCEDURE — 25010000002 PROPOFOL 1000 MG/ML EMULSION: Performed by: THORACIC SURGERY (CARDIOTHORACIC VASCULAR SURGERY)

## 2017-06-28 PROCEDURE — 06JY4ZZ INSPECTION OF LOWER VEIN, PERCUTANEOUS ENDOSCOPIC APPROACH: ICD-10-PCS | Performed by: THORACIC SURGERY (CARDIOTHORACIC VASCULAR SURGERY)

## 2017-06-28 PROCEDURE — 82947 ASSAY GLUCOSE BLOOD QUANT: CPT

## 2017-06-28 PROCEDURE — 25010000002 INSULIN REGULAR HUMAN PER 5 UNITS: Performed by: PHYSICIAN ASSISTANT

## 2017-06-28 PROCEDURE — 99233 SBSQ HOSP IP/OBS HIGH 50: CPT | Performed by: INTERNAL MEDICINE

## 2017-06-28 PROCEDURE — C1894 INTRO/SHEATH, NON-LASER: HCPCS | Performed by: THORACIC SURGERY (CARDIOTHORACIC VASCULAR SURGERY)

## 2017-06-28 PROCEDURE — C1751 CATH, INF, PER/CENT/MIDLINE: HCPCS | Performed by: ANESTHESIOLOGY

## 2017-06-28 PROCEDURE — 06BQ4ZZ EXCISION OF LEFT SAPHENOUS VEIN, PERCUTANEOUS ENDOSCOPIC APPROACH: ICD-10-PCS | Performed by: THORACIC SURGERY (CARDIOTHORACIC VASCULAR SURGERY)

## 2017-06-28 PROCEDURE — 25010000002 PROTAMINE SULFATE PER 10 MG

## 2017-06-28 PROCEDURE — 82805 BLOOD GASES W/O2 SATURATION: CPT | Performed by: PHYSICIAN ASSISTANT

## 2017-06-28 PROCEDURE — 81001 URINALYSIS AUTO W/SCOPE: CPT | Performed by: PHYSICIAN ASSISTANT

## 2017-06-28 PROCEDURE — 25010000002 VANCOMYCIN PER 500 MG: Performed by: THORACIC SURGERY (CARDIOTHORACIC VASCULAR SURGERY)

## 2017-06-28 PROCEDURE — 25010000002 HYDROMORPHONE PER 4 MG: Performed by: ANESTHESIOLOGY

## 2017-06-28 PROCEDURE — 86900 BLOOD TYPING SEROLOGIC ABO: CPT

## 2017-06-28 PROCEDURE — 25010000002 ALBUMIN HUMAN 5% PER 50 ML: Performed by: PHYSICIAN ASSISTANT

## 2017-06-28 PROCEDURE — 85610 PROTHROMBIN TIME: CPT | Performed by: PHYSICIAN ASSISTANT

## 2017-06-28 PROCEDURE — 84132 ASSAY OF SERUM POTASSIUM: CPT

## 2017-06-28 PROCEDURE — 82330 ASSAY OF CALCIUM: CPT | Performed by: PHYSICIAN ASSISTANT

## 2017-06-28 PROCEDURE — 25010000002 MANNITOL PER 50 ML

## 2017-06-28 PROCEDURE — 33533 CABG ARTERIAL SINGLE: CPT | Performed by: PHYSICIAN ASSISTANT

## 2017-06-28 PROCEDURE — 71010 HC CHEST PA OR AP: CPT

## 2017-06-28 PROCEDURE — 25010000002 CEFUROXIME PER 750 MG: Performed by: ANESTHESIOLOGY

## 2017-06-28 PROCEDURE — 85347 COAGULATION TIME ACTIVATED: CPT

## 2017-06-28 PROCEDURE — 25010000002 HEPARIN (PORCINE) PER 1000 UNITS: Performed by: ANESTHESIOLOGY

## 2017-06-28 PROCEDURE — 25010000002 PHENYLEPHRINE PER 1 ML

## 2017-06-28 PROCEDURE — 33508 ENDOSCOPIC VEIN HARVEST: CPT | Performed by: THORACIC SURGERY (CARDIOTHORACIC VASCULAR SURGERY)

## 2017-06-28 PROCEDURE — 80069 RENAL FUNCTION PANEL: CPT | Performed by: PHYSICIAN ASSISTANT

## 2017-06-28 PROCEDURE — 85014 HEMATOCRIT: CPT | Performed by: THORACIC SURGERY (CARDIOTHORACIC VASCULAR SURGERY)

## 2017-06-28 PROCEDURE — 82803 BLOOD GASES ANY COMBINATION: CPT

## 2017-06-28 PROCEDURE — 93005 ELECTROCARDIOGRAM TRACING: CPT | Performed by: PHYSICIAN ASSISTANT

## 2017-06-28 PROCEDURE — 94002 VENT MGMT INPAT INIT DAY: CPT

## 2017-06-28 PROCEDURE — 85014 HEMATOCRIT: CPT

## 2017-06-28 PROCEDURE — 02100Z9 BYPASS CORONARY ARTERY, ONE ARTERY FROM LEFT INTERNAL MAMMARY, OPEN APPROACH: ICD-10-PCS | Performed by: THORACIC SURGERY (CARDIOTHORACIC VASCULAR SURGERY)

## 2017-06-28 PROCEDURE — P9041 ALBUMIN (HUMAN),5%, 50ML: HCPCS | Performed by: PHYSICIAN ASSISTANT

## 2017-06-28 PROCEDURE — 25010000002 AMIODARONE IN DEXTROSE 5% 360-4.14 MG/200ML-% SOLUTION

## 2017-06-28 PROCEDURE — A4648 IMPLANTABLE TISSUE MARKER: HCPCS

## 2017-06-28 PROCEDURE — 33519 CABG ARTERY-VEIN THREE: CPT | Performed by: THORACIC SURGERY (CARDIOTHORACIC VASCULAR SURGERY)

## 2017-06-28 PROCEDURE — 87086 URINE CULTURE/COLONY COUNT: CPT | Performed by: PHYSICIAN ASSISTANT

## 2017-06-28 PROCEDURE — 36430 TRANSFUSION BLD/BLD COMPNT: CPT

## 2017-06-28 PROCEDURE — 25010000002 MIDAZOLAM PER 1 MG: Performed by: ANESTHESIOLOGY

## 2017-06-28 PROCEDURE — 25010000002 HEPARIN (PORCINE) PER 1000 UNITS

## 2017-06-28 PROCEDURE — 85027 COMPLETE CBC AUTOMATED: CPT | Performed by: PHYSICIAN ASSISTANT

## 2017-06-28 PROCEDURE — 25010000002 MAGNESIUM SULFATE PER 500 MG OF MAGNESIUM

## 2017-06-28 PROCEDURE — 25010000002 ALBUMIN HUMAN 5% PER 50 ML: Performed by: NURSE PRACTITIONER

## 2017-06-28 PROCEDURE — 82330 ASSAY OF CALCIUM: CPT

## 2017-06-28 PROCEDURE — 25010000002 FUROSEMIDE PER 20 MG

## 2017-06-28 PROCEDURE — 25010000002 FENTANYL CITRATE (PF) 100 MCG/2ML SOLUTION: Performed by: THORACIC SURGERY (CARDIOTHORACIC VASCULAR SURGERY)

## 2017-06-28 RX ORDER — PROPOFOL 10 MG/ML
VIAL (ML) INTRAVENOUS CONTINUOUS PRN
Status: DISCONTINUED | OUTPATIENT
Start: 2017-06-28 | End: 2017-06-28 | Stop reason: SURG

## 2017-06-28 RX ORDER — CHLORHEXIDINE GLUCONATE 0.12 MG/ML
15 RINSE ORAL EVERY 12 HOURS SCHEDULED
Status: DISCONTINUED | OUTPATIENT
Start: 2017-06-28 | End: 2017-06-29

## 2017-06-28 RX ORDER — MAGNESIUM SULFATE HEPTAHYDRATE 40 MG/ML
2 INJECTION, SOLUTION INTRAVENOUS AS NEEDED
Status: DISCONTINUED | OUTPATIENT
Start: 2017-06-28 | End: 2017-07-03

## 2017-06-28 RX ORDER — ROCURONIUM BROMIDE 10 MG/ML
INJECTION, SOLUTION INTRAVENOUS AS NEEDED
Status: DISCONTINUED | OUTPATIENT
Start: 2017-06-28 | End: 2017-06-28 | Stop reason: SURG

## 2017-06-28 RX ORDER — DOPAMINE HYDROCHLORIDE 160 MG/100ML
2-20 INJECTION, SOLUTION INTRAVENOUS CONTINUOUS PRN
Status: DISCONTINUED | OUTPATIENT
Start: 2017-06-28 | End: 2017-06-29

## 2017-06-28 RX ORDER — MEPERIDINE HYDROCHLORIDE 25 MG/ML
25 INJECTION INTRAMUSCULAR; INTRAVENOUS; SUBCUTANEOUS EVERY 4 HOURS PRN
Status: DISCONTINUED | OUTPATIENT
Start: 2017-06-28 | End: 2017-06-29

## 2017-06-28 RX ORDER — ASPIRIN 325 MG
325 TABLET ORAL ONCE
Status: COMPLETED | OUTPATIENT
Start: 2017-06-28 | End: 2017-06-28

## 2017-06-28 RX ORDER — SODIUM CHLORIDE 9 MG/ML
INJECTION, SOLUTION INTRAVENOUS AS NEEDED
Status: DISCONTINUED | OUTPATIENT
Start: 2017-06-28 | End: 2017-06-28 | Stop reason: HOSPADM

## 2017-06-28 RX ORDER — POTASSIUM CHLORIDE 29.8 MG/ML
20 INJECTION INTRAVENOUS
Status: DISCONTINUED | OUTPATIENT
Start: 2017-06-28 | End: 2017-07-03

## 2017-06-28 RX ORDER — DEXMEDETOMIDINE HYDROCHLORIDE 4 UG/ML
.2-1.5 INJECTION, SOLUTION INTRAVENOUS CONTINUOUS PRN
Status: DISCONTINUED | OUTPATIENT
Start: 2017-06-28 | End: 2017-06-29

## 2017-06-28 RX ORDER — ASPIRIN 325 MG
325 TABLET, DELAYED RELEASE (ENTERIC COATED) ORAL DAILY
Status: DISCONTINUED | OUTPATIENT
Start: 2017-06-29 | End: 2017-06-30 | Stop reason: DRUGHIGH

## 2017-06-28 RX ORDER — SENNA AND DOCUSATE SODIUM 50; 8.6 MG/1; MG/1
2 TABLET, FILM COATED ORAL 2 TIMES DAILY
Status: DISCONTINUED | OUTPATIENT
Start: 2017-06-28 | End: 2017-07-04 | Stop reason: HOSPADM

## 2017-06-28 RX ORDER — CHLORHEXIDINE GLUCONATE 0.12 MG/ML
15 RINSE ORAL ONCE
Status: COMPLETED | OUTPATIENT
Start: 2017-06-28 | End: 2017-06-28

## 2017-06-28 RX ORDER — ACETAMINOPHEN 325 MG/1
650 TABLET ORAL EVERY 4 HOURS PRN
Status: DISCONTINUED | OUTPATIENT
Start: 2017-06-28 | End: 2017-06-28 | Stop reason: HOSPADM

## 2017-06-28 RX ORDER — FENTANYL CITRATE 50 UG/ML
50 INJECTION, SOLUTION INTRAMUSCULAR; INTRAVENOUS
Status: DISCONTINUED | OUTPATIENT
Start: 2017-06-28 | End: 2017-06-29

## 2017-06-28 RX ORDER — AMIODARONE HYDROCHLORIDE 200 MG/1
200 TABLET ORAL EVERY 8 HOURS
Status: DISCONTINUED | OUTPATIENT
Start: 2017-06-30 | End: 2017-07-01

## 2017-06-28 RX ORDER — SODIUM CHLORIDE 9 MG/ML
INJECTION, SOLUTION INTRAVENOUS CONTINUOUS PRN
Status: DISCONTINUED | OUTPATIENT
Start: 2017-06-28 | End: 2017-06-28 | Stop reason: SURG

## 2017-06-28 RX ORDER — HYDROMORPHONE HYDROCHLORIDE 1 MG/ML
0.5 INJECTION, SOLUTION INTRAMUSCULAR; INTRAVENOUS; SUBCUTANEOUS
Status: COMPLETED | OUTPATIENT
Start: 2017-06-28 | End: 2017-06-29

## 2017-06-28 RX ORDER — NITROGLYCERIN 20 MG/100ML
5-200 INJECTION INTRAVENOUS CONTINUOUS PRN
Status: DISCONTINUED | OUTPATIENT
Start: 2017-06-28 | End: 2017-07-03

## 2017-06-28 RX ORDER — ASPIRIN 325 MG
325 TABLET ORAL NIGHTLY
Status: DISCONTINUED | OUTPATIENT
Start: 2017-06-28 | End: 2017-06-28

## 2017-06-28 RX ORDER — ACETAMINOPHEN 325 MG/1
650 TABLET ORAL EVERY 4 HOURS PRN
Status: DISCONTINUED | OUTPATIENT
Start: 2017-06-28 | End: 2017-07-04 | Stop reason: HOSPADM

## 2017-06-28 RX ORDER — SODIUM CHLORIDE, SODIUM LACTATE, POTASSIUM CHLORIDE, CALCIUM CHLORIDE 600; 310; 30; 20 MG/100ML; MG/100ML; MG/100ML; MG/100ML
9 INJECTION, SOLUTION INTRAVENOUS CONTINUOUS
Status: DISCONTINUED | OUTPATIENT
Start: 2017-06-28 | End: 2017-06-28 | Stop reason: SDUPTHER

## 2017-06-28 RX ORDER — FENTANYL CITRATE 50 UG/ML
25 INJECTION, SOLUTION INTRAMUSCULAR; INTRAVENOUS
Status: DISCONTINUED | OUTPATIENT
Start: 2017-06-28 | End: 2017-06-29

## 2017-06-28 RX ORDER — DOBUTAMINE HYDROCHLORIDE 100 MG/100ML
2-20 INJECTION INTRAVENOUS CONTINUOUS PRN
Status: DISCONTINUED | OUTPATIENT
Start: 2017-06-28 | End: 2017-06-29

## 2017-06-28 RX ORDER — MAGNESIUM SULFATE HEPTAHYDRATE 40 MG/ML
4 INJECTION, SOLUTION INTRAVENOUS AS NEEDED
Status: DISCONTINUED | OUTPATIENT
Start: 2017-06-28 | End: 2017-07-03

## 2017-06-28 RX ORDER — FAMOTIDINE 20 MG/1
20 TABLET, FILM COATED ORAL ONCE
Status: COMPLETED | OUTPATIENT
Start: 2017-06-28 | End: 2017-06-28

## 2017-06-28 RX ORDER — MIDAZOLAM HYDROCHLORIDE 1 MG/ML
INJECTION INTRAMUSCULAR; INTRAVENOUS AS NEEDED
Status: DISCONTINUED | OUTPATIENT
Start: 2017-06-28 | End: 2017-06-28 | Stop reason: SURG

## 2017-06-28 RX ORDER — POTASSIUM CHLORIDE 1.5 G/1.77G
40 POWDER, FOR SOLUTION ORAL AS NEEDED
Status: DISCONTINUED | OUTPATIENT
Start: 2017-06-28 | End: 2017-07-03

## 2017-06-28 RX ORDER — HEPARIN SODIUM 1000 [USP'U]/ML
INJECTION, SOLUTION INTRAVENOUS; SUBCUTANEOUS AS NEEDED
Status: DISCONTINUED | OUTPATIENT
Start: 2017-06-28 | End: 2017-06-28 | Stop reason: SURG

## 2017-06-28 RX ORDER — AMIODARONE HYDROCHLORIDE 200 MG/1
200 TABLET ORAL DAILY
Status: DISCONTINUED | OUTPATIENT
Start: 2017-07-20 | End: 2017-07-01

## 2017-06-28 RX ORDER — AMIODARONE HYDROCHLORIDE 200 MG/1
200 TABLET ORAL EVERY 12 HOURS
Status: DISCONTINUED | OUTPATIENT
Start: 2017-07-06 | End: 2017-07-01

## 2017-06-28 RX ORDER — ONDANSETRON 2 MG/ML
4 INJECTION INTRAMUSCULAR; INTRAVENOUS EVERY 6 HOURS PRN
Status: DISCONTINUED | OUTPATIENT
Start: 2017-06-28 | End: 2017-07-04 | Stop reason: HOSPADM

## 2017-06-28 RX ORDER — METOPROLOL TARTRATE 5 MG/5ML
2.5 INJECTION INTRAVENOUS EVERY 6 HOURS SCHEDULED
Status: DISCONTINUED | OUTPATIENT
Start: 2017-06-28 | End: 2017-06-29

## 2017-06-28 RX ORDER — PAPAVERINE HYDROCHLORIDE 30 MG/ML
INJECTION INTRAMUSCULAR; INTRAVENOUS AS NEEDED
Status: DISCONTINUED | OUTPATIENT
Start: 2017-06-28 | End: 2017-06-28 | Stop reason: HOSPADM

## 2017-06-28 RX ORDER — NITROGLYCERIN 0.4 MG/1
0.4 TABLET SUBLINGUAL
Status: DISCONTINUED | OUTPATIENT
Start: 2017-06-28 | End: 2017-06-28 | Stop reason: HOSPADM

## 2017-06-28 RX ORDER — PROTAMINE SULFATE 10 MG/ML
INJECTION, SOLUTION INTRAVENOUS AS NEEDED
Status: DISCONTINUED | OUTPATIENT
Start: 2017-06-28 | End: 2017-06-28 | Stop reason: SURG

## 2017-06-28 RX ORDER — FAMOTIDINE 10 MG/ML
20 INJECTION, SOLUTION INTRAVENOUS 2 TIMES DAILY
Status: DISCONTINUED | OUTPATIENT
Start: 2017-06-28 | End: 2017-06-29

## 2017-06-28 RX ORDER — VECURONIUM BROMIDE 1 MG/ML
INJECTION, POWDER, LYOPHILIZED, FOR SOLUTION INTRAVENOUS AS NEEDED
Status: DISCONTINUED | OUTPATIENT
Start: 2017-06-28 | End: 2017-06-28 | Stop reason: SURG

## 2017-06-28 RX ORDER — HYDROCODONE BITARTRATE AND ACETAMINOPHEN 7.5; 325 MG/1; MG/1
1 TABLET ORAL EVERY 4 HOURS PRN
Status: DISCONTINUED | OUTPATIENT
Start: 2017-06-28 | End: 2017-06-30

## 2017-06-28 RX ORDER — SODIUM CHLORIDE 9 MG/ML
30 INJECTION, SOLUTION INTRAVENOUS CONTINUOUS PRN
Status: DISCONTINUED | OUTPATIENT
Start: 2017-06-28 | End: 2017-06-29

## 2017-06-28 RX ORDER — CHLORHEXIDINE GLUCONATE 500 MG/1
1 CLOTH TOPICAL EVERY 12 HOURS PRN
Status: DISCONTINUED | OUTPATIENT
Start: 2017-06-28 | End: 2017-06-28 | Stop reason: HOSPADM

## 2017-06-28 RX ORDER — NALOXONE HCL 0.4 MG/ML
0.4 VIAL (ML) INJECTION
Status: DISCONTINUED | OUTPATIENT
Start: 2017-06-28 | End: 2017-06-29

## 2017-06-28 RX ORDER — OXYCODONE HYDROCHLORIDE AND ACETAMINOPHEN 5; 325 MG/1; MG/1
2 TABLET ORAL EVERY 4 HOURS PRN
Status: DISCONTINUED | OUTPATIENT
Start: 2017-06-28 | End: 2017-06-30

## 2017-06-28 RX ORDER — ATORVASTATIN CALCIUM 40 MG/1
40 TABLET, FILM COATED ORAL NIGHTLY
Status: DISCONTINUED | OUTPATIENT
Start: 2017-06-28 | End: 2017-07-04 | Stop reason: HOSPADM

## 2017-06-28 RX ORDER — PROPOFOL 10 MG/ML
VIAL (ML) INTRAVENOUS AS NEEDED
Status: DISCONTINUED | OUTPATIENT
Start: 2017-06-28 | End: 2017-06-28 | Stop reason: SURG

## 2017-06-28 RX ORDER — NOREPINEPHRINE BITARTRATE 1 MG/ML
INJECTION, SOLUTION INTRAVENOUS CONTINUOUS PRN
Status: DISCONTINUED | OUTPATIENT
Start: 2017-06-28 | End: 2017-06-28 | Stop reason: SURG

## 2017-06-28 RX ORDER — BISACODYL 10 MG
10 SUPPOSITORY, RECTAL RECTAL DAILY PRN
Status: DISCONTINUED | OUTPATIENT
Start: 2017-06-29 | End: 2017-07-03

## 2017-06-28 RX ORDER — PHENYLEPHRINE HCL IN 0.9% NACL 0.5 MG/5ML
.5-3 SYRINGE (ML) INTRAVENOUS CONTINUOUS PRN
Status: DISCONTINUED | OUTPATIENT
Start: 2017-06-28 | End: 2017-07-03

## 2017-06-28 RX ORDER — POTASSIUM CHLORIDE, DEXTROSE MONOHYDRATE 150; 5 MG/100ML; G/100ML
30 INJECTION, SOLUTION INTRAVENOUS CONTINUOUS
Status: DISCONTINUED | OUTPATIENT
Start: 2017-06-28 | End: 2017-06-29

## 2017-06-28 RX ORDER — AMIODARONE HYDROCHLORIDE 200 MG/1
200 TABLET ORAL ONCE
Status: COMPLETED | OUTPATIENT
Start: 2017-06-29 | End: 2017-06-29

## 2017-06-28 RX ORDER — DOCUSATE SODIUM 100 MG/1
100 CAPSULE, LIQUID FILLED ORAL 2 TIMES DAILY PRN
Status: DISCONTINUED | OUTPATIENT
Start: 2017-06-28 | End: 2017-07-04 | Stop reason: HOSPADM

## 2017-06-28 RX ORDER — FAMOTIDINE 20 MG/1
20 TABLET, FILM COATED ORAL 2 TIMES DAILY
Status: DISCONTINUED | OUTPATIENT
Start: 2017-06-28 | End: 2017-07-04 | Stop reason: HOSPADM

## 2017-06-28 RX ORDER — NITROGLYCERIN 20 MG/100ML
INJECTION INTRAVENOUS CONTINUOUS PRN
Status: DISCONTINUED | OUTPATIENT
Start: 2017-06-28 | End: 2017-06-28 | Stop reason: SURG

## 2017-06-28 RX ORDER — ALBUMIN, HUMAN INJ 5% 5 %
500 SOLUTION INTRAVENOUS AS NEEDED
Status: COMPLETED | OUTPATIENT
Start: 2017-06-28 | End: 2017-06-28

## 2017-06-28 RX ORDER — MORPHINE SULFATE 2 MG/ML
2 INJECTION, SOLUTION INTRAMUSCULAR; INTRAVENOUS
Status: DISCONTINUED | OUTPATIENT
Start: 2017-06-28 | End: 2017-06-29

## 2017-06-28 RX ORDER — METOPROLOL TARTRATE 5 MG/5ML
1 INJECTION INTRAVENOUS ONCE
Status: COMPLETED | OUTPATIENT
Start: 2017-06-28 | End: 2017-06-28

## 2017-06-28 RX ORDER — CALCIUM CHLORIDE 100 MG/ML
INJECTION INTRAVENOUS; INTRAVENTRICULAR AS NEEDED
Status: DISCONTINUED | OUTPATIENT
Start: 2017-06-28 | End: 2017-06-28 | Stop reason: SURG

## 2017-06-28 RX ORDER — SODIUM CHLORIDE 0.9 % (FLUSH) 0.9 %
30 SYRINGE (ML) INJECTION ONCE AS NEEDED
Status: DISCONTINUED | OUTPATIENT
Start: 2017-06-28 | End: 2017-06-29

## 2017-06-28 RX ORDER — FAMOTIDINE 10 MG/ML
20 INJECTION, SOLUTION INTRAVENOUS ONCE
Status: DISCONTINUED | OUTPATIENT
Start: 2017-06-28 | End: 2017-06-28

## 2017-06-28 RX ORDER — PROTAMINE SULFATE 10 MG/ML
INJECTION, SOLUTION INTRAVENOUS
Status: COMPLETED
Start: 2017-06-28 | End: 2017-06-28

## 2017-06-28 RX ORDER — PROTAMINE SULFATE 10 MG/ML
50 INJECTION, SOLUTION INTRAVENOUS ONCE
Status: COMPLETED | OUTPATIENT
Start: 2017-06-28 | End: 2017-06-28

## 2017-06-28 RX ORDER — AMINOCAPROIC ACID 250 MG/ML
INJECTION, SOLUTION INTRAVENOUS AS NEEDED
Status: DISCONTINUED | OUTPATIENT
Start: 2017-06-28 | End: 2017-06-28 | Stop reason: SURG

## 2017-06-28 RX ORDER — LIDOCAINE HYDROCHLORIDE 10 MG/ML
0.5 INJECTION, SOLUTION EPIDURAL; INFILTRATION; INTRACAUDAL; PERINEURAL ONCE AS NEEDED
Status: COMPLETED | OUTPATIENT
Start: 2017-06-28 | End: 2017-06-28

## 2017-06-28 RX ORDER — VANCOMYCIN HYDROCHLORIDE 500 MG/10ML
INJECTION, POWDER, LYOPHILIZED, FOR SOLUTION INTRAVENOUS AS NEEDED
Status: DISCONTINUED | OUTPATIENT
Start: 2017-06-28 | End: 2017-06-28 | Stop reason: HOSPADM

## 2017-06-28 RX ORDER — SUFENTANIL CITRATE 50 UG/ML
INJECTION EPIDURAL; INTRAVENOUS AS NEEDED
Status: DISCONTINUED | OUTPATIENT
Start: 2017-06-28 | End: 2017-06-28 | Stop reason: SURG

## 2017-06-28 RX ORDER — BISACODYL 5 MG/1
10 TABLET, DELAYED RELEASE ORAL DAILY PRN
Status: DISCONTINUED | OUTPATIENT
Start: 2017-06-28 | End: 2017-07-04 | Stop reason: HOSPADM

## 2017-06-28 RX ORDER — ALBUMIN, HUMAN INJ 5% 5 %
500 SOLUTION INTRAVENOUS ONCE
Status: COMPLETED | OUTPATIENT
Start: 2017-06-28 | End: 2017-06-28

## 2017-06-28 RX ORDER — SODIUM CHLORIDE 0.9 % (FLUSH) 0.9 %
1-10 SYRINGE (ML) INJECTION AS NEEDED
Status: DISCONTINUED | OUTPATIENT
Start: 2017-06-28 | End: 2017-06-28 | Stop reason: HOSPADM

## 2017-06-28 RX ORDER — POTASSIUM CHLORIDE 750 MG/1
40 CAPSULE, EXTENDED RELEASE ORAL AS NEEDED
Status: DISCONTINUED | OUTPATIENT
Start: 2017-06-28 | End: 2017-07-03

## 2017-06-28 RX ADMIN — PHENYLEPHRINE HYDROCHLORIDE 100 MCG: 10 INJECTION INTRAVENOUS at 13:50

## 2017-06-28 RX ADMIN — PHENYLEPHRINE HYDROCHLORIDE 100 MCG: 10 INJECTION INTRAVENOUS at 13:40

## 2017-06-28 RX ADMIN — ASPIRIN 325 MG ORAL TABLET 325 MG: 325 PILL ORAL at 18:25

## 2017-06-28 RX ADMIN — CHLORHEXIDINE GLUCONATE 15 ML: 1.2 RINSE ORAL at 10:55

## 2017-06-28 RX ADMIN — NOREPINEPHRINE BITARTRATE 0.1 MCG/KG/MIN: 1 INJECTION, SOLUTION, CONCENTRATE INTRAVENOUS at 16:01

## 2017-06-28 RX ADMIN — OXYCODONE AND ACETAMINOPHEN 2 TABLET: 5; 325 TABLET ORAL at 21:24

## 2017-06-28 RX ADMIN — NITROGLYCERIN 10 MCG/MIN: 20 INJECTION INTRAVENOUS at 17:29

## 2017-06-28 RX ADMIN — FAMOTIDINE 20 MG: 20 TABLET, FILM COATED ORAL at 11:47

## 2017-06-28 RX ADMIN — CHLORHEXIDINE GLUCONATE 15 ML: 1.2 RINSE ORAL at 20:29

## 2017-06-28 RX ADMIN — SODIUM CHLORIDE, POTASSIUM CHLORIDE, SODIUM LACTATE AND CALCIUM CHLORIDE 9 ML/HR: 600; 310; 30; 20 INJECTION, SOLUTION INTRAVENOUS at 11:47

## 2017-06-28 RX ADMIN — CEFUROXIME 1.5 G: 1.5 INJECTION, POWDER, FOR SOLUTION INTRAVENOUS at 13:39

## 2017-06-28 RX ADMIN — AMINOCAPROIC ACID 10 G: 250 INJECTION, SOLUTION INTRAVENOUS at 13:45

## 2017-06-28 RX ADMIN — HEPARIN SODIUM 30000 UNITS: 1000 INJECTION, SOLUTION INTRAVENOUS; SUBCUTANEOUS at 14:12

## 2017-06-28 RX ADMIN — SUFENTANIL CITRATE 75 MCG: 50 INJECTION EPIDURAL; INTRAVENOUS at 15:50

## 2017-06-28 RX ADMIN — AMIODARONE HYDROCHLORIDE 1 MG/MIN: 1.8 INJECTION, SOLUTION INTRAVENOUS at 17:32

## 2017-06-28 RX ADMIN — FENTANYL CITRATE 25 MCG: 50 INJECTION INTRAMUSCULAR; INTRAVENOUS at 22:18

## 2017-06-28 RX ADMIN — FENTANYL CITRATE 25 MCG: 50 INJECTION INTRAMUSCULAR; INTRAVENOUS at 21:20

## 2017-06-28 RX ADMIN — Medication 0.05 MCG/KG/MIN: at 19:17

## 2017-06-28 RX ADMIN — MUPIROCIN 1 APPLICATION: 20 OINTMENT TOPICAL at 10:55

## 2017-06-28 RX ADMIN — PHENYLEPHRINE HYDROCHLORIDE 100 MCG: 10 INJECTION INTRAVENOUS at 13:55

## 2017-06-28 RX ADMIN — FAMOTIDINE 20 MG: 10 INJECTION INTRAVENOUS at 17:50

## 2017-06-28 RX ADMIN — PROPOFOL 10 MCG/KG/MIN: 10 INJECTION, EMULSION INTRAVENOUS at 16:38

## 2017-06-28 RX ADMIN — AMIODARONE HYDROCHLORIDE 1 MG/MIN: 1.8 INJECTION, SOLUTION INTRAVENOUS at 22:46

## 2017-06-28 RX ADMIN — ROCURONIUM BROMIDE 95 MG: 10 INJECTION INTRAVENOUS at 13:25

## 2017-06-28 RX ADMIN — SODIUM CHLORIDE: 9 INJECTION, SOLUTION INTRAVENOUS at 12:30

## 2017-06-28 RX ADMIN — DEXMEDETOMIDINE HYDROCHLORIDE 0.2 MCG/KG/HR: 4 INJECTION, SOLUTION INTRAVENOUS at 21:49

## 2017-06-28 RX ADMIN — PROPOFOL 35 MCG/KG/MIN: 10 INJECTION, EMULSION INTRAVENOUS at 17:29

## 2017-06-28 RX ADMIN — PROTAMINE SULFATE 50 MG: 10 INJECTION, SOLUTION INTRAVENOUS at 17:32

## 2017-06-28 RX ADMIN — SUFENTANIL CITRATE 25 MCG: 50 INJECTION EPIDURAL; INTRAVENOUS at 14:27

## 2017-06-28 RX ADMIN — AMIODARONE HYDROCHLORIDE 0.5 MG/MIN: 1.8 INJECTION, SOLUTION INTRAVENOUS at 23:53

## 2017-06-28 RX ADMIN — AMINOCAPROIC ACID 10 G: 250 INJECTION, SOLUTION INTRAVENOUS at 16:30

## 2017-06-28 RX ADMIN — PROTAMINE SULFATE 450 MG: 10 INJECTION, SOLUTION INTRAVENOUS at 16:20

## 2017-06-28 RX ADMIN — MIDAZOLAM HYDROCHLORIDE 1.5 MG: 1 INJECTION, SOLUTION INTRAMUSCULAR; INTRAVENOUS at 14:28

## 2017-06-28 RX ADMIN — ALBUMIN HUMAN 500 ML: 0.05 INJECTION, SOLUTION INTRAVENOUS at 20:39

## 2017-06-28 RX ADMIN — SUFENTANIL CITRATE 25 MCG: 50 INJECTION EPIDURAL; INTRAVENOUS at 13:24

## 2017-06-28 RX ADMIN — PHENYLEPHRINE HYDROCHLORIDE 100 MCG: 10 INJECTION INTRAVENOUS at 14:10

## 2017-06-28 RX ADMIN — PROTAMINE SULFATE 50 MG: 10 INJECTION, SOLUTION INTRAVENOUS at 16:28

## 2017-06-28 RX ADMIN — ROCURONIUM BROMIDE 5 MG: 10 INJECTION INTRAVENOUS at 13:24

## 2017-06-28 RX ADMIN — HYDROMORPHONE HYDROCHLORIDE 0.5 MG: 1 INJECTION, SOLUTION INTRAMUSCULAR; INTRAVENOUS; SUBCUTANEOUS at 23:53

## 2017-06-28 RX ADMIN — CEFUROXIME 1.5 G: 1.5 INJECTION, POWDER, FOR SOLUTION INTRAVENOUS at 23:53

## 2017-06-28 RX ADMIN — SODIUM CHLORIDE: 9 INJECTION, SOLUTION INTRAVENOUS at 13:40

## 2017-06-28 RX ADMIN — CEFUROXIME 1.5 G: 1.5 INJECTION, POWDER, FOR SOLUTION INTRAVENOUS at 16:27

## 2017-06-28 RX ADMIN — ALBUMIN HUMAN 500 ML: 0.05 INJECTION, SOLUTION INTRAVENOUS at 18:30

## 2017-06-28 RX ADMIN — PROPOFOL 50 MG: 10 INJECTION, EMULSION INTRAVENOUS at 13:25

## 2017-06-28 RX ADMIN — PROPOFOL 60 MCG/KG/MIN: 10 INJECTION, EMULSION INTRAVENOUS at 20:48

## 2017-06-28 RX ADMIN — ATORVASTATIN CALCIUM 40 MG: 40 TABLET, FILM COATED ORAL at 20:31

## 2017-06-28 RX ADMIN — MIDAZOLAM HYDROCHLORIDE 1.5 MG: 1 INJECTION, SOLUTION INTRAMUSCULAR; INTRAVENOUS at 15:50

## 2017-06-28 RX ADMIN — METOPROLOL TARTRATE 1 MG: 5 INJECTION INTRAVENOUS at 11:47

## 2017-06-28 RX ADMIN — NITROGLYCERIN 5 MCG/MIN: 20 INJECTION INTRAVENOUS at 14:10

## 2017-06-28 RX ADMIN — HYDROCODONE BITARTRATE AND ACETAMINOPHEN 1 TABLET: 7.5; 325 TABLET ORAL at 23:52

## 2017-06-28 RX ADMIN — MIDAZOLAM HYDROCHLORIDE 2 MG: 1 INJECTION, SOLUTION INTRAMUSCULAR; INTRAVENOUS at 13:24

## 2017-06-28 RX ADMIN — VECURONIUM BROMIDE 6 MG: 1 INJECTION, POWDER, LYOPHILIZED, FOR SOLUTION INTRAVENOUS at 14:16

## 2017-06-28 RX ADMIN — FENTANYL CITRATE 25 MCG: 50 INJECTION INTRAMUSCULAR; INTRAVENOUS at 21:48

## 2017-06-28 RX ADMIN — CALCIUM CHLORIDE 1 G: 100 INJECTION INTRAVENOUS; INTRAVENTRICULAR at 16:21

## 2017-06-28 RX ADMIN — PHENYLEPHRINE HYDROCHLORIDE 200 MCG: 10 INJECTION INTRAVENOUS at 13:30

## 2017-06-28 RX ADMIN — SUFENTANIL CITRATE 125 MCG: 50 INJECTION EPIDURAL; INTRAVENOUS at 13:57

## 2017-06-28 RX ADMIN — SODIUM CHLORIDE 2 UNITS/HR: 9 INJECTION, SOLUTION INTRAVENOUS at 20:40

## 2017-06-28 RX ADMIN — POTASSIUM CHLORIDE AND DEXTROSE MONOHYDRATE 30 ML/HR: 150; 5 INJECTION, SOLUTION INTRAVENOUS at 17:30

## 2017-06-28 RX ADMIN — LIDOCAINE HYDROCHLORIDE 0.4 ML: 10 INJECTION, SOLUTION EPIDURAL; INFILTRATION; INTRACAUDAL; PERINEURAL at 11:47

## 2017-06-28 RX ADMIN — ALBUMIN HUMAN 500 ML: 0.05 INJECTION, SOLUTION INTRAVENOUS at 17:51

## 2017-06-28 NOTE — ANESTHESIA PROCEDURE NOTES
Arterial Line    Patient location during procedure: pre-op  Start time: 6/28/2017 12:15 PM  Stop Time:6/28/2017 12:25 PM       Line placed for hemodynamic monitoring.  Performed By   Anesthesiologist: KIM BRAVO  Arterial Line Prep   Sterile Tech: cap, gloves, mask and sterile barriers  Prep: ChloraPrep  Patient monitoring: continuous pulse oximetry and EKG  Arterial Line Procedure   Laterality:right  Location:  radial artery  Catheter size: 22 G   Guidance: palpation technique  Number of attempts: 1  Successful placement: yes          Post Assessment   Dressing Type: biopatch applied, line sutured, occlusive dressing applied, secured with tape and wrist guard applied.   Complications no  Circ/Move/Sens Assessment: normal.   Patient Tolerance: patient tolerated the procedure well with no apparent complications

## 2017-06-28 NOTE — ANESTHESIA PROCEDURE NOTES
Airway  Urgency: elective    Date/Time: 6/28/2017 1:26 PM  End Time:6/28/2017 1:27 PM  Airway not difficult    General Information and Staff    Patient location during procedure: OR  Anesthesiologist: KIM BRAVO    Indications and Patient Condition  Indications for airway management: airway protection    Preoxygenated: yes  MILS not maintained throughout  Mask difficulty assessment: 1 - vent by mask    Final Airway Details  Final airway type: endotracheal airway      Successful airway: ETT  Cuffed: yes   Successful intubation technique: direct laryngoscopy  Endotracheal tube insertion site: oral  Blade: [place]  Blade size: #4  ETT size: 8.0 mm  Cormack-Lehane Classification: grade IIa - partial view of glottis  Placement verified by: chest auscultation and capnometry   Measured from: lips  ETT to lips (cm): 22  Number of attempts at approach: 1    Additional Comments  Negative epigastric sounds, Breath sound equal bilaterally with symmetric chest rise and fall

## 2017-06-28 NOTE — ANESTHESIA POSTPROCEDURE EVALUATION
Patient: Aly Gabriel III    Procedure Summary     Date Anesthesia Start Anesthesia Stop Room / Location    06/28/17 1314  [facility] OR [facility] / [facility] OR       Procedure Diagnosis Surgeon Provider    MEDIAN STERNOTOMY, CORONARY ARTERY BYPASS graft X 4  UTILIZING THE LEFT INTERNAL MAMMARY ARTERY  AND EVH OF THE LEFT GREATER SAPHENOUS VEIN EXPLORATION OF THE RIGHT GREATER SAPHENOUS VEIN  (N/A Chest) Coronary artery disease involving native coronary artery of native heart with angina pectoris  (Coronary artery disease involving native coronary artery of native heart with angina pectoris [I25.119]) MD Hola Mei MD          Anesthesia Type: general  Last vitals  BP   112/77   Temp   95.2   Pulse  76   Resp   [id number]    SpO2   100%     Post Anesthesia Care and Evaluation    Patient location during evaluation: PACU  Patient participation: complete - patient participated  Level of consciousness: awake and alert  Pain score: 0  Pain management: adequate  Airway patency: patent  Anesthetic complications: No anesthetic complications  PONV Status: none  Cardiovascular status: hemodynamically stable and acceptable  Respiratory status: nonlabored ventilation, acceptable and nasal cannula  Hydration status: acceptable

## 2017-06-28 NOTE — ANESTHESIA PREPROCEDURE EVALUATION
Anesthesia Evaluation     Patient summary reviewed and Nursing notes reviewed   NPO Solid Status: > 8 hours  NPO Liquid Status: > 8 hours     Airway   Mallampati: II  TM distance: >3 FB  Neck ROM: full  no difficulty expected  Dental      Pulmonary    (+) a smoker Former, COPD, shortness of breath,   Pulmonary embolism: 2104.  Cardiovascular     ECG reviewed    (+) hypertension, CAD, PVD (carotid disease CEA CVA), hyperlipidemia    ROS comment: EF 69%  Inf Basal diaphragmatic and lateral isch changes on SPECT scan no CP  EKG NSR    Neuro/Psych  CVA: 2014.  GI/Hepatic/Renal/Endo      Musculoskeletal     Abdominal    Substance History      OB/GYN          Other   (+) arthritis                                     Anesthesia Plan    ASA 4     general   (A-LINE SGC )  intravenous induction   Anesthetic plan and risks discussed with patient.    Plan discussed with CRNA.

## 2017-06-28 NOTE — ANESTHESIA PROCEDURE NOTES
Central Line    Patient location during procedure: OR  Start time: 6/28/2017 1:33 PM  Stop Time:6/28/2017 1:33 PM  Indications: central pressure monitoring  Staff  Anesthesiologist: KIM BRAVO  Preanesthetic Checklist  Completed: site marked, surgical consent, pre-op evaluation, timeout performed, IV checked, risks and benefits discussed and monitors and equipment checked  Central Line Prep  Sterile Tech:cap, gloves, gown, mask and sterile barriers  Prep: chloraprep  Patient monitoring: blood pressure monitoring, continuous pulse oximetry and EKG  Central Line Procedure  Location:internal jugular  Catheter Type:Cordis  Catheter Size:9 Fr  Guidance:ultrasound guided  PROCEDURE NOTE/ULTRASOUND INTERPRETATION.  Using ultrasound guidance the potential vascular sites for insertion of the catheter were visualized to determine the patency of the vessel to be used for vascular access.  After selecting the appropriate site for insertion, the needle was visualized under ultrasound being inserted into the internal jugular vein, followed by ultrasound confirmation of wire and catheter placement. There were no abnormalities seen on ultrasound; an image was taken; and the patient tolerated the procedure with no complications.   Assessment  Post procedure:biopatch applied, line sutured, occlusive dressing applied and secured with tape  Assessement:blood return through all ports, free fluid flow and chest x-ray ordered  Complications:no  Patient Tolerance:patient tolerated the procedure well with no apparent complications

## 2017-06-29 ENCOUNTER — APPOINTMENT (OUTPATIENT)
Dept: GENERAL RADIOLOGY | Facility: HOSPITAL | Age: 67
End: 2017-06-29

## 2017-06-29 PROBLEM — R07.9 CHEST PAIN: Status: RESOLVED | Noted: 2017-03-30 | Resolved: 2017-06-29

## 2017-06-29 PROBLEM — I25.10 CORONARY ARTERY DISEASE: Status: RESOLVED | Noted: 2017-06-28 | Resolved: 2017-06-29

## 2017-06-29 PROBLEM — R94.39 ABNORMAL STRESS TEST: Status: RESOLVED | Noted: 2017-05-08 | Resolved: 2017-06-29

## 2017-06-29 PROBLEM — I48.0 PAROXYSMAL ATRIAL FIBRILLATION (HCC): Status: ACTIVE | Noted: 2017-06-29

## 2017-06-29 LAB
ABO + RH BLD: NORMAL
ALBUMIN SERPL-MCNC: 3.8 G/DL (ref 3.2–4.8)
ANION GAP SERPL CALCULATED.3IONS-SCNC: 7 MMOL/L (ref 3–11)
ARTERIAL PATENCY WRIST A: ABNORMAL
ATMOSPHERIC PRESS: ABNORMAL MMHG
ATMOSPHERIC PRESS: ABNORMAL MMHG
BASE EXCESS BLDA CALC-SCNC: -1 MMOL/L (ref 0–2)
BASE EXCESS BLDV CALC-SCNC: 0.3 MMOL/L (ref -2–2)
BASOPHILS # BLD AUTO: 0.03 10*3/MM3 (ref 0–0.2)
BASOPHILS NFR BLD AUTO: 0.2 % (ref 0–1)
BDY SITE: ABNORMAL
BDY SITE: ABNORMAL
BH BB BLOOD EXPIRATION DATE: NORMAL
BH BB BLOOD TYPE BARCODE: 6200
BH BB DISPENSE STATUS: NORMAL
BH BB PRODUCT CODE: NORMAL
BH BB UNIT NUMBER: NORMAL
BUN BLD-MCNC: 16 MG/DL (ref 9–23)
BUN/CREAT SERPL: 12.3 (ref 7–25)
CALCIUM SPEC-SCNC: 9.3 MG/DL (ref 8.7–10.4)
CHLORIDE SERPL-SCNC: 109 MMOL/L (ref 99–109)
CO2 BLDA-SCNC: 25.3 MMOL/L (ref 22–33)
CO2 BLDA-SCNC: 27.6 MMOL/L (ref 22–33)
CO2 SERPL-SCNC: 23 MMOL/L (ref 20–31)
COHGB MFR BLD: 1.3 % (ref 0–2)
COHGB MFR BLD: 1.4 % (ref 0–2)
CREAT BLD-MCNC: 1.3 MG/DL (ref 0.6–1.3)
DEPRECATED RDW RBC AUTO: 53.2 FL (ref 37–54)
EOSINOPHIL # BLD AUTO: 0.17 10*3/MM3 (ref 0–0.3)
EOSINOPHIL NFR BLD AUTO: 1.2 % (ref 0–3)
ERYTHROCYTE [DISTWIDTH] IN BLOOD BY AUTOMATED COUNT: 14.6 % (ref 11.3–14.5)
GFR SERPL CREATININE-BSD FRML MDRD: 55 ML/MIN/1.73
GLUCOSE BLD-MCNC: 132 MG/DL (ref 70–100)
GLUCOSE BLDC GLUCOMTR-MCNC: 105 MG/DL (ref 70–130)
GLUCOSE BLDC GLUCOMTR-MCNC: 113 MG/DL (ref 70–130)
GLUCOSE BLDC GLUCOMTR-MCNC: 116 MG/DL (ref 70–130)
GLUCOSE BLDC GLUCOMTR-MCNC: 120 MG/DL (ref 70–130)
GLUCOSE BLDC GLUCOMTR-MCNC: 123 MG/DL (ref 70–130)
GLUCOSE BLDC GLUCOMTR-MCNC: 125 MG/DL (ref 70–130)
GLUCOSE BLDC GLUCOMTR-MCNC: 132 MG/DL (ref 70–130)
GLUCOSE BLDC GLUCOMTR-MCNC: 134 MG/DL (ref 70–130)
GLUCOSE BLDC GLUCOMTR-MCNC: 140 MG/DL (ref 70–130)
GLUCOSE BLDC GLUCOMTR-MCNC: 142 MG/DL (ref 70–130)
GLUCOSE BLDC GLUCOMTR-MCNC: 144 MG/DL (ref 70–130)
GLUCOSE BLDC GLUCOMTR-MCNC: 144 MG/DL (ref 70–130)
GLUCOSE BLDC GLUCOMTR-MCNC: 148 MG/DL (ref 70–130)
GLUCOSE BLDC GLUCOMTR-MCNC: 157 MG/DL (ref 70–130)
GLUCOSE BLDC GLUCOMTR-MCNC: 88 MG/DL (ref 70–130)
GLUCOSE BLDC GLUCOMTR-MCNC: 92 MG/DL (ref 70–130)
GLUCOSE BLDC GLUCOMTR-MCNC: 95 MG/DL (ref 70–130)
GLUCOSE BLDC GLUCOMTR-MCNC: 96 MG/DL (ref 70–130)
HCO3 BLDA-SCNC: 24.1 MMOL/L (ref 20–26)
HCO3 BLDV-SCNC: 26.1 MMOL/L (ref 22–28)
HCT VFR BLD AUTO: 24.4 % (ref 38.9–50.9)
HCT VFR BLD AUTO: 25.6 % (ref 38.9–50.9)
HCT VFR BLD CALC: 25.4 %
HGB BLD-MCNC: 7.9 G/DL (ref 13.1–17.5)
HGB BLD-MCNC: 8.3 G/DL (ref 13.1–17.5)
HGB BLDA-MCNC: 8.3 G/DL (ref 13.5–17.5)
HGB BLDA-MCNC: 8.4 G/DL (ref 13.5–17.5)
HOROWITZ INDEX BLD+IHG-RTO: 36 %
HOROWITZ INDEX BLD+IHG-RTO: 36 %
IMM GRANULOCYTES # BLD: 0.07 10*3/MM3 (ref 0–0.03)
IMM GRANULOCYTES NFR BLD: 0.5 % (ref 0–0.6)
INR PPP: 1.01
LYMPHOCYTES # BLD AUTO: 2.65 10*3/MM3 (ref 0.6–4.8)
LYMPHOCYTES NFR BLD AUTO: 18.2 % (ref 24–44)
MAGNESIUM SERPL-MCNC: 3.1 MG/DL (ref 1.3–2.7)
MCH RBC QN AUTO: 32.5 PG (ref 27–31)
MCHC RBC AUTO-ENTMCNC: 32.4 G/DL (ref 32–36)
MCV RBC AUTO: 100.4 FL (ref 80–99)
METHGB BLD QL: 1.2 % (ref 0–1.5)
METHGB BLD QL: 1.2 % (ref 0–1.5)
MODALITY: ABNORMAL
MODALITY: ABNORMAL
MONOCYTES # BLD AUTO: 1.99 10*3/MM3 (ref 0–1)
MONOCYTES NFR BLD AUTO: 13.7 % (ref 0–12)
NEUTROPHILS # BLD AUTO: 9.66 10*3/MM3 (ref 1.5–8.3)
NEUTROPHILS NFR BLD AUTO: 66.2 % (ref 41–71)
OXYHGB MFR BLDV: 54.9 % (ref 94–99)
OXYHGB MFR BLDV: 91.4 % (ref 94–99)
PCO2 BLDA: 41 MM HG (ref 35–48)
PCO2 BLDV: 49 MM HG (ref 41–51)
PH BLDA: 7.38 PH UNITS (ref 7.35–7.45)
PH BLDV: 7.33 [PH] (ref 7.25–7.5)
PHOSPHATE SERPL-MCNC: 4.3 MG/DL (ref 2.4–5.1)
PLATELET # BLD AUTO: 240 10*3/MM3 (ref 150–450)
PMV BLD AUTO: 9.6 FL (ref 6–12)
PO2 BLDA: 68.4 MM HG (ref 83–108)
PO2 BLDV: 32.9 MM HG (ref 27–53)
POTASSIUM BLD-SCNC: 3.9 MMOL/L (ref 3.5–5.5)
POTASSIUM BLD-SCNC: 4.1 MMOL/L (ref 3.5–5.5)
PROTHROMBIN TIME: 11 SECONDS (ref 9.6–11.5)
RBC # BLD AUTO: 2.43 10*6/MM3 (ref 4.2–5.76)
SAO2 % BLDCOV: 54.9 % (ref 45–75)
SODIUM BLD-SCNC: 139 MMOL/L (ref 132–146)
UNIT  ABO: NORMAL
UNIT  RH: NORMAL
WBC NRBC COR # BLD: 14.57 10*3/MM3 (ref 3.5–10.8)

## 2017-06-29 PROCEDURE — 92610 EVALUATE SWALLOWING FUNCTION: CPT

## 2017-06-29 PROCEDURE — 94760 N-INVAS EAR/PLS OXIMETRY 1: CPT

## 2017-06-29 PROCEDURE — 94640 AIRWAY INHALATION TREATMENT: CPT

## 2017-06-29 PROCEDURE — 82962 GLUCOSE BLOOD TEST: CPT

## 2017-06-29 PROCEDURE — 25010000002 FUROSEMIDE PER 20 MG: Performed by: PHYSICIAN ASSISTANT

## 2017-06-29 PROCEDURE — 25010000002 MORPHINE SULFATE (PF) 2 MG/ML SOLUTION

## 2017-06-29 PROCEDURE — 94799 UNLISTED PULMONARY SVC/PX: CPT

## 2017-06-29 PROCEDURE — P9041 ALBUMIN (HUMAN),5%, 50ML: HCPCS | Performed by: NURSE PRACTITIONER

## 2017-06-29 PROCEDURE — 85018 HEMOGLOBIN: CPT | Performed by: THORACIC SURGERY (CARDIOTHORACIC VASCULAR SURGERY)

## 2017-06-29 PROCEDURE — 82805 BLOOD GASES W/O2 SATURATION: CPT | Performed by: THORACIC SURGERY (CARDIOTHORACIC VASCULAR SURGERY)

## 2017-06-29 PROCEDURE — 71010 HC CHEST PA OR AP: CPT

## 2017-06-29 PROCEDURE — 25010000002 ALBUMIN HUMAN 5% PER 50 ML: Performed by: NURSE PRACTITIONER

## 2017-06-29 PROCEDURE — 25010000002 CEFUROXIME: Performed by: THORACIC SURGERY (CARDIOTHORACIC VASCULAR SURGERY)

## 2017-06-29 PROCEDURE — 99233 SBSQ HOSP IP/OBS HIGH 50: CPT | Performed by: INTERNAL MEDICINE

## 2017-06-29 PROCEDURE — 97163 PT EVAL HIGH COMPLEX 45 MIN: CPT

## 2017-06-29 PROCEDURE — 85610 PROTHROMBIN TIME: CPT | Performed by: PHYSICIAN ASSISTANT

## 2017-06-29 PROCEDURE — 25010000002 AMIODARONE IN DEXTROSE 5% 360-4.14 MG/200ML-% SOLUTION: Performed by: THORACIC SURGERY (CARDIOTHORACIC VASCULAR SURGERY)

## 2017-06-29 PROCEDURE — 36430 TRANSFUSION BLD/BLD COMPNT: CPT

## 2017-06-29 PROCEDURE — 97110 THERAPEUTIC EXERCISES: CPT

## 2017-06-29 PROCEDURE — 99221 1ST HOSP IP/OBS SF/LOW 40: CPT | Performed by: INTERNAL MEDICINE

## 2017-06-29 PROCEDURE — 85025 COMPLETE CBC W/AUTO DIFF WBC: CPT | Performed by: PHYSICIAN ASSISTANT

## 2017-06-29 PROCEDURE — 99024 POSTOP FOLLOW-UP VISIT: CPT | Performed by: THORACIC SURGERY (CARDIOTHORACIC VASCULAR SURGERY)

## 2017-06-29 PROCEDURE — 80069 RENAL FUNCTION PANEL: CPT | Performed by: PHYSICIAN ASSISTANT

## 2017-06-29 PROCEDURE — 25010000002 ONDANSETRON PER 1 MG: Performed by: PHYSICIAN ASSISTANT

## 2017-06-29 PROCEDURE — 85014 HEMATOCRIT: CPT | Performed by: THORACIC SURGERY (CARDIOTHORACIC VASCULAR SURGERY)

## 2017-06-29 PROCEDURE — P9016 RBC LEUKOCYTES REDUCED: HCPCS

## 2017-06-29 PROCEDURE — 25010000002 MORPHINE SULFATE (PF) 2 MG/ML SOLUTION: Performed by: THORACIC SURGERY (CARDIOTHORACIC VASCULAR SURGERY)

## 2017-06-29 PROCEDURE — 86900 BLOOD TYPING SEROLOGIC ABO: CPT

## 2017-06-29 PROCEDURE — 93005 ELECTROCARDIOGRAM TRACING: CPT | Performed by: PHYSICIAN ASSISTANT

## 2017-06-29 PROCEDURE — 83735 ASSAY OF MAGNESIUM: CPT | Performed by: PHYSICIAN ASSISTANT

## 2017-06-29 PROCEDURE — 25010000002 HYDROMORPHONE PER 4 MG: Performed by: ANESTHESIOLOGY

## 2017-06-29 PROCEDURE — 84132 ASSAY OF SERUM POTASSIUM: CPT | Performed by: THORACIC SURGERY (CARDIOTHORACIC VASCULAR SURGERY)

## 2017-06-29 RX ORDER — DEXMEDETOMIDINE HYDROCHLORIDE 4 UG/ML
INJECTION, SOLUTION INTRAVENOUS
Status: COMPLETED
Start: 2017-06-29 | End: 2017-06-29

## 2017-06-29 RX ORDER — FUROSEMIDE 10 MG/ML
40 INJECTION INTRAMUSCULAR; INTRAVENOUS ONCE
Status: COMPLETED | OUTPATIENT
Start: 2017-06-29 | End: 2017-06-29

## 2017-06-29 RX ORDER — MORPHINE SULFATE 2 MG/ML
2 INJECTION, SOLUTION INTRAMUSCULAR; INTRAVENOUS
Status: DISCONTINUED | OUTPATIENT
Start: 2017-06-29 | End: 2017-07-03

## 2017-06-29 RX ORDER — DEXMEDETOMIDINE HYDROCHLORIDE 4 UG/ML
.2-1.5 INJECTION, SOLUTION INTRAVENOUS
Status: DISCONTINUED | OUTPATIENT
Start: 2017-06-29 | End: 2017-07-03

## 2017-06-29 RX ORDER — ALBUMIN, HUMAN INJ 5% 5 %
500 SOLUTION INTRAVENOUS ONCE
Status: COMPLETED | OUTPATIENT
Start: 2017-06-29 | End: 2017-06-29

## 2017-06-29 RX ADMIN — CEFUROXIME 1.5 G: 1.5 INJECTION, POWDER, FOR SOLUTION INTRAVENOUS at 15:30

## 2017-06-29 RX ADMIN — MORPHINE SULFATE 2 MG: 2 INJECTION, SOLUTION INTRAMUSCULAR; INTRAVENOUS at 21:26

## 2017-06-29 RX ADMIN — Medication 2 TABLET: at 17:51

## 2017-06-29 RX ADMIN — AMIODARONE HYDROCHLORIDE 200 MG: 200 TABLET ORAL at 17:51

## 2017-06-29 RX ADMIN — ALBUTEROL SULFATE 2.5 MG: 2.5 SOLUTION RESPIRATORY (INHALATION) at 08:22

## 2017-06-29 RX ADMIN — ONDANSETRON 4 MG: 2 INJECTION INTRAMUSCULAR; INTRAVENOUS at 14:35

## 2017-06-29 RX ADMIN — OXYCODONE AND ACETAMINOPHEN 2 TABLET: 5; 325 TABLET ORAL at 22:59

## 2017-06-29 RX ADMIN — MORPHINE SULFATE 2 MG: 2 INJECTION, SOLUTION INTRAMUSCULAR; INTRAVENOUS at 17:52

## 2017-06-29 RX ADMIN — ONDANSETRON 4 MG: 2 INJECTION INTRAMUSCULAR; INTRAVENOUS at 07:24

## 2017-06-29 RX ADMIN — MORPHINE SULFATE 2 MG: 2 INJECTION, SOLUTION INTRAMUSCULAR; INTRAVENOUS at 12:17

## 2017-06-29 RX ADMIN — ASPIRIN 325 MG: 325 TABLET, COATED ORAL at 08:46

## 2017-06-29 RX ADMIN — Medication 0.12 MCG/KG/MIN: at 14:35

## 2017-06-29 RX ADMIN — FAMOTIDINE 20 MG: 20 TABLET ORAL at 17:52

## 2017-06-29 RX ADMIN — OXYCODONE AND ACETAMINOPHEN 2 TABLET: 5; 325 TABLET ORAL at 12:17

## 2017-06-29 RX ADMIN — DEXMEDETOMIDINE HYDROCHLORIDE 1.5 MCG/KG/HR: 4 INJECTION, SOLUTION INTRAVENOUS at 01:26

## 2017-06-29 RX ADMIN — HYDROCODONE BITARTRATE AND ACETAMINOPHEN 1 TABLET: 7.5; 325 TABLET ORAL at 20:12

## 2017-06-29 RX ADMIN — MORPHINE SULFATE 2 MG: 2 INJECTION, SOLUTION INTRAMUSCULAR; INTRAVENOUS at 09:09

## 2017-06-29 RX ADMIN — DEXMEDETOMIDINE HYDROCHLORIDE 0.8 MCG/KG/HR: 4 INJECTION, SOLUTION INTRAVENOUS at 19:36

## 2017-06-29 RX ADMIN — SODIUM CHLORIDE 500 ML: 9 INJECTION, SOLUTION INTRAVENOUS at 05:18

## 2017-06-29 RX ADMIN — CHLORHEXIDINE GLUCONATE 15 ML: 1.2 RINSE ORAL at 10:22

## 2017-06-29 RX ADMIN — ATORVASTATIN CALCIUM 40 MG: 40 TABLET, FILM COATED ORAL at 20:12

## 2017-06-29 RX ADMIN — MORPHINE SULFATE 2 MG: 2 INJECTION, SOLUTION INTRAMUSCULAR; INTRAVENOUS at 07:54

## 2017-06-29 RX ADMIN — CEFUROXIME 1.5 G: 1.5 INJECTION, POWDER, FOR SOLUTION INTRAVENOUS at 08:49

## 2017-06-29 RX ADMIN — AMIODARONE HYDROCHLORIDE 0.5 MG/MIN: 1.8 INJECTION, SOLUTION INTRAVENOUS at 10:23

## 2017-06-29 RX ADMIN — OXYCODONE AND ACETAMINOPHEN 2 TABLET: 5; 325 TABLET ORAL at 17:52

## 2017-06-29 RX ADMIN — FAMOTIDINE 20 MG: 10 INJECTION INTRAVENOUS at 08:46

## 2017-06-29 RX ADMIN — OXYCODONE AND ACETAMINOPHEN 2 TABLET: 5; 325 TABLET ORAL at 03:17

## 2017-06-29 RX ADMIN — MORPHINE SULFATE 2 MG: 2 INJECTION, SOLUTION INTRAMUSCULAR; INTRAVENOUS at 15:00

## 2017-06-29 RX ADMIN — HYDROMORPHONE HYDROCHLORIDE 0.5 MG: 1 INJECTION, SOLUTION INTRAMUSCULAR; INTRAVENOUS; SUBCUTANEOUS at 07:13

## 2017-06-29 RX ADMIN — FUROSEMIDE 40 MG: 10 INJECTION, SOLUTION INTRAMUSCULAR; INTRAVENOUS at 08:46

## 2017-06-29 RX ADMIN — ALBUMIN HUMAN 500 ML: 0.05 INJECTION, SOLUTION INTRAVENOUS at 05:59

## 2017-06-29 RX ADMIN — HYDROCODONE BITARTRATE AND ACETAMINOPHEN 1 TABLET: 7.5; 325 TABLET ORAL at 07:19

## 2017-06-29 RX ADMIN — DEXMEDETOMIDINE HYDROCHLORIDE 0.8 MCG/KG/HR: 4 INJECTION, SOLUTION INTRAVENOUS at 19:00

## 2017-06-29 RX ADMIN — DEXMEDETOMIDINE HYDROCHLORIDE 1 MCG/KG/HR: 4 INJECTION, SOLUTION INTRAVENOUS at 04:45

## 2017-06-29 RX ADMIN — HYDROMORPHONE HYDROCHLORIDE 0.5 MG: 1 INJECTION, SOLUTION INTRAMUSCULAR; INTRAVENOUS; SUBCUTANEOUS at 00:51

## 2017-06-29 RX ADMIN — HYDROMORPHONE HYDROCHLORIDE 0.5 MG: 1 INJECTION, SOLUTION INTRAMUSCULAR; INTRAVENOUS; SUBCUTANEOUS at 03:17

## 2017-06-29 RX ADMIN — Medication 2 TABLET: at 08:46

## 2017-06-29 RX ADMIN — DEXMEDETOMIDINE HYDROCHLORIDE 0.8 MCG/KG/HR: 4 INJECTION, SOLUTION INTRAVENOUS at 12:18

## 2017-06-30 ENCOUNTER — APPOINTMENT (OUTPATIENT)
Dept: GENERAL RADIOLOGY | Facility: HOSPITAL | Age: 67
End: 2017-06-30

## 2017-06-30 LAB
ANION GAP SERPL CALCULATED.3IONS-SCNC: 3 MMOL/L (ref 3–11)
BACTERIA SPEC AEROBE CULT: NORMAL
BUN BLD-MCNC: 11 MG/DL (ref 9–23)
BUN/CREAT SERPL: 13.8 (ref 7–25)
CALCIUM SPEC-SCNC: 9.1 MG/DL (ref 8.7–10.4)
CHLORIDE SERPL-SCNC: 107 MMOL/L (ref 99–109)
CO2 SERPL-SCNC: 25 MMOL/L (ref 20–31)
CREAT BLD-MCNC: 0.8 MG/DL (ref 0.6–1.3)
DEPRECATED RDW RBC AUTO: 54.7 FL (ref 37–54)
ERYTHROCYTE [DISTWIDTH] IN BLOOD BY AUTOMATED COUNT: 15 % (ref 11.3–14.5)
GFR SERPL CREATININE-BSD FRML MDRD: 96 ML/MIN/1.73
GLUCOSE BLD-MCNC: 130 MG/DL (ref 70–100)
GLUCOSE BLDC GLUCOMTR-MCNC: 126 MG/DL (ref 70–130)
GLUCOSE BLDC GLUCOMTR-MCNC: 126 MG/DL (ref 70–130)
GLUCOSE BLDC GLUCOMTR-MCNC: 140 MG/DL (ref 70–130)
GLUCOSE BLDC GLUCOMTR-MCNC: 149 MG/DL (ref 70–130)
GLUCOSE BLDC GLUCOMTR-MCNC: 181 MG/DL (ref 70–130)
HCT VFR BLD AUTO: 23.9 % (ref 38.9–50.9)
HGB BLD-MCNC: 8 G/DL (ref 13.1–17.5)
MCH RBC QN AUTO: 33.6 PG (ref 27–31)
MCHC RBC AUTO-ENTMCNC: 33.5 G/DL (ref 32–36)
MCV RBC AUTO: 100.4 FL (ref 80–99)
PLATELET # BLD AUTO: 169 10*3/MM3 (ref 150–450)
PMV BLD AUTO: 9.7 FL (ref 6–12)
POTASSIUM BLD-SCNC: 4 MMOL/L (ref 3.5–5.5)
RBC # BLD AUTO: 2.38 10*6/MM3 (ref 4.2–5.76)
SODIUM BLD-SCNC: 135 MMOL/L (ref 132–146)
WBC NRBC COR # BLD: 17.6 10*3/MM3 (ref 3.5–10.8)

## 2017-06-30 PROCEDURE — 25010000002 FUROSEMIDE PER 20 MG: Performed by: PHYSICIAN ASSISTANT

## 2017-06-30 PROCEDURE — 82962 GLUCOSE BLOOD TEST: CPT

## 2017-06-30 PROCEDURE — P9016 RBC LEUKOCYTES REDUCED: HCPCS

## 2017-06-30 PROCEDURE — 25010000002 MORPHINE SULFATE (PF) 2 MG/ML SOLUTION

## 2017-06-30 PROCEDURE — 93010 ELECTROCARDIOGRAM REPORT: CPT | Performed by: INTERNAL MEDICINE

## 2017-06-30 PROCEDURE — 86900 BLOOD TYPING SEROLOGIC ABO: CPT

## 2017-06-30 PROCEDURE — 63710000001 INSULIN REGULAR HUMAN PER 5 UNITS

## 2017-06-30 PROCEDURE — 99024 POSTOP FOLLOW-UP VISIT: CPT | Performed by: THORACIC SURGERY (CARDIOTHORACIC VASCULAR SURGERY)

## 2017-06-30 PROCEDURE — 85027 COMPLETE CBC AUTOMATED: CPT | Performed by: PHYSICIAN ASSISTANT

## 2017-06-30 PROCEDURE — 99232 SBSQ HOSP IP/OBS MODERATE 35: CPT | Performed by: INTERNAL MEDICINE

## 2017-06-30 PROCEDURE — 25010000002 CEFUROXIME: Performed by: THORACIC SURGERY (CARDIOTHORACIC VASCULAR SURGERY)

## 2017-06-30 PROCEDURE — 92610 EVALUATE SWALLOWING FUNCTION: CPT

## 2017-06-30 PROCEDURE — 93005 ELECTROCARDIOGRAM TRACING: CPT | Performed by: PHYSICIAN ASSISTANT

## 2017-06-30 PROCEDURE — 36430 TRANSFUSION BLD/BLD COMPNT: CPT

## 2017-06-30 PROCEDURE — 80048 BASIC METABOLIC PNL TOTAL CA: CPT | Performed by: PHYSICIAN ASSISTANT

## 2017-06-30 PROCEDURE — 71010 HC CHEST PA OR AP: CPT

## 2017-06-30 PROCEDURE — 99233 SBSQ HOSP IP/OBS HIGH 50: CPT | Performed by: INTERNAL MEDICINE

## 2017-06-30 PROCEDURE — 97110 THERAPEUTIC EXERCISES: CPT

## 2017-06-30 RX ORDER — OXYCODONE HYDROCHLORIDE 5 MG/1
10 TABLET ORAL EVERY 4 HOURS PRN
Status: DISCONTINUED | OUTPATIENT
Start: 2017-06-30 | End: 2017-07-04 | Stop reason: HOSPADM

## 2017-06-30 RX ORDER — HYDROCODONE BITARTRATE AND ACETAMINOPHEN 7.5; 325 MG/1; MG/1
2 TABLET ORAL EVERY 4 HOURS PRN
Status: DISCONTINUED | OUTPATIENT
Start: 2017-06-30 | End: 2017-07-04 | Stop reason: HOSPADM

## 2017-06-30 RX ORDER — FUROSEMIDE 10 MG/ML
40 INJECTION INTRAMUSCULAR; INTRAVENOUS ONCE
Status: COMPLETED | OUTPATIENT
Start: 2017-06-30 | End: 2017-06-30

## 2017-06-30 RX ORDER — ASPIRIN 325 MG
325 TABLET ORAL DAILY
Status: DISCONTINUED | OUTPATIENT
Start: 2017-06-30 | End: 2017-07-04 | Stop reason: HOSPADM

## 2017-06-30 RX ADMIN — AMIODARONE HYDROCHLORIDE 200 MG: 200 TABLET ORAL at 18:12

## 2017-06-30 RX ADMIN — HYDROCODONE BITARTRATE AND ACETAMINOPHEN 1 TABLET: 7.5; 325 TABLET ORAL at 04:11

## 2017-06-30 RX ADMIN — ATORVASTATIN CALCIUM 40 MG: 40 TABLET, FILM COATED ORAL at 20:50

## 2017-06-30 RX ADMIN — MORPHINE SULFATE 2 MG: 2 INJECTION, SOLUTION INTRAMUSCULAR; INTRAVENOUS at 02:31

## 2017-06-30 RX ADMIN — MORPHINE SULFATE 2 MG: 2 INJECTION, SOLUTION INTRAMUSCULAR; INTRAVENOUS at 08:47

## 2017-06-30 RX ADMIN — HYDROCODONE BITARTRATE AND ACETAMINOPHEN 2 TABLET: 7.5; 325 TABLET ORAL at 15:31

## 2017-06-30 RX ADMIN — AMIODARONE HYDROCHLORIDE 200 MG: 200 TABLET ORAL at 02:21

## 2017-06-30 RX ADMIN — MORPHINE SULFATE 2 MG: 2 INJECTION, SOLUTION INTRAMUSCULAR; INTRAVENOUS at 15:31

## 2017-06-30 RX ADMIN — OXYCODONE HYDROCHLORIDE 10 MG: 5 TABLET ORAL at 23:40

## 2017-06-30 RX ADMIN — MORPHINE SULFATE 2 MG: 2 INJECTION, SOLUTION INTRAMUSCULAR; INTRAVENOUS at 20:49

## 2017-06-30 RX ADMIN — MORPHINE SULFATE 2 MG: 2 INJECTION, SOLUTION INTRAMUSCULAR; INTRAVENOUS at 00:30

## 2017-06-30 RX ADMIN — MORPHINE SULFATE 2 MG: 2 INJECTION, SOLUTION INTRAMUSCULAR; INTRAVENOUS at 23:45

## 2017-06-30 RX ADMIN — CEFUROXIME 1.5 G: 1.5 INJECTION, POWDER, FOR SOLUTION INTRAVENOUS at 00:14

## 2017-06-30 RX ADMIN — CEFUROXIME 1.5 G: 1.5 INJECTION, POWDER, FOR SOLUTION INTRAVENOUS at 08:52

## 2017-06-30 RX ADMIN — INSULIN HUMAN 2 UNITS: 100 INJECTION, SOLUTION PARENTERAL at 12:18

## 2017-06-30 RX ADMIN — HYDROCODONE BITARTRATE AND ACETAMINOPHEN 2 TABLET: 7.5; 325 TABLET ORAL at 19:56

## 2017-06-30 RX ADMIN — DEXMEDETOMIDINE HYDROCHLORIDE 1.2 MCG/KG/HR: 4 INJECTION, SOLUTION INTRAVENOUS at 02:36

## 2017-06-30 RX ADMIN — HYDROCODONE BITARTRATE AND ACETAMINOPHEN 1 TABLET: 7.5; 325 TABLET ORAL at 00:46

## 2017-06-30 RX ADMIN — FAMOTIDINE 20 MG: 20 TABLET ORAL at 08:55

## 2017-06-30 RX ADMIN — FAMOTIDINE 20 MG: 20 TABLET ORAL at 18:13

## 2017-06-30 RX ADMIN — OXYCODONE HYDROCHLORIDE 10 MG: 5 TABLET ORAL at 12:08

## 2017-06-30 RX ADMIN — FUROSEMIDE 40 MG: 10 INJECTION, SOLUTION INTRAMUSCULAR; INTRAVENOUS at 08:52

## 2017-06-30 RX ADMIN — ASPIRIN 325 MG ORAL TABLET 325 MG: 325 PILL ORAL at 08:52

## 2017-06-30 RX ADMIN — Medication 2 TABLET: at 08:52

## 2017-06-30 RX ADMIN — OXYCODONE HYDROCHLORIDE 10 MG: 5 TABLET ORAL at 18:12

## 2017-06-30 RX ADMIN — DEXMEDETOMIDINE HYDROCHLORIDE 0.8 MCG/KG/HR: 4 INJECTION, SOLUTION INTRAVENOUS at 08:54

## 2017-06-30 RX ADMIN — Medication 2 TABLET: at 18:12

## 2017-06-30 RX ADMIN — AMIODARONE HYDROCHLORIDE 200 MG: 200 TABLET ORAL at 08:55

## 2017-06-30 RX ADMIN — HYDROCODONE BITARTRATE AND ACETAMINOPHEN 1 TABLET: 7.5; 325 TABLET ORAL at 08:53

## 2017-06-30 RX ADMIN — Medication 0.02 MCG/KG/MIN: at 23:37

## 2017-06-30 RX ADMIN — MORPHINE SULFATE 2 MG: 2 INJECTION, SOLUTION INTRAMUSCULAR; INTRAVENOUS at 04:15

## 2017-07-01 ENCOUNTER — APPOINTMENT (OUTPATIENT)
Dept: GENERAL RADIOLOGY | Facility: HOSPITAL | Age: 67
End: 2017-07-01

## 2017-07-01 LAB
ABO + RH BLD: NORMAL
ABO + RH BLD: NORMAL
ANION GAP SERPL CALCULATED.3IONS-SCNC: 4 MMOL/L (ref 3–11)
BASOPHILS # BLD AUTO: 0.02 10*3/MM3 (ref 0–0.2)
BASOPHILS NFR BLD AUTO: 0.1 % (ref 0–1)
BH BB BLOOD EXPIRATION DATE: NORMAL
BH BB BLOOD EXPIRATION DATE: NORMAL
BH BB BLOOD TYPE BARCODE: 5100
BH BB BLOOD TYPE BARCODE: 5100
BH BB DISPENSE STATUS: NORMAL
BH BB DISPENSE STATUS: NORMAL
BH BB PRODUCT CODE: NORMAL
BH BB PRODUCT CODE: NORMAL
BH BB UNIT NUMBER: NORMAL
BH BB UNIT NUMBER: NORMAL
BUN BLD-MCNC: 15 MG/DL (ref 9–23)
BUN/CREAT SERPL: 18.8 (ref 7–25)
CALCIUM SPEC-SCNC: 9.8 MG/DL (ref 8.7–10.4)
CHLORIDE SERPL-SCNC: 103 MMOL/L (ref 99–109)
CO2 SERPL-SCNC: 30 MMOL/L (ref 20–31)
CREAT BLD-MCNC: 0.8 MG/DL (ref 0.6–1.3)
CROSSMATCH INTERPRETATION: NORMAL
CROSSMATCH INTERPRETATION: NORMAL
DEPRECATED RDW RBC AUTO: 59.6 FL (ref 37–54)
EOSINOPHIL # BLD AUTO: 0.25 10*3/MM3 (ref 0–0.3)
EOSINOPHIL NFR BLD AUTO: 1.2 % (ref 0–3)
ERYTHROCYTE [DISTWIDTH] IN BLOOD BY AUTOMATED COUNT: 16.5 % (ref 11.3–14.5)
GFR SERPL CREATININE-BSD FRML MDRD: 96 ML/MIN/1.73
GLUCOSE BLD-MCNC: 125 MG/DL (ref 70–100)
GLUCOSE BLDC GLUCOMTR-MCNC: 131 MG/DL (ref 70–130)
GLUCOSE BLDC GLUCOMTR-MCNC: 131 MG/DL (ref 70–130)
GLUCOSE BLDC GLUCOMTR-MCNC: 149 MG/DL (ref 70–130)
GLUCOSE BLDC GLUCOMTR-MCNC: 169 MG/DL (ref 70–130)
HCT VFR BLD AUTO: 28.2 % (ref 38.9–50.9)
HGB BLD-MCNC: 9.3 G/DL (ref 13.1–17.5)
IMM GRANULOCYTES # BLD: 0.09 10*3/MM3 (ref 0–0.03)
IMM GRANULOCYTES NFR BLD: 0.4 % (ref 0–0.6)
LYMPHOCYTES # BLD AUTO: 2.88 10*3/MM3 (ref 0.6–4.8)
LYMPHOCYTES NFR BLD AUTO: 14.1 % (ref 24–44)
MAGNESIUM SERPL-MCNC: 2.1 MG/DL (ref 1.3–2.7)
MCH RBC QN AUTO: 32.5 PG (ref 27–31)
MCHC RBC AUTO-ENTMCNC: 33 G/DL (ref 32–36)
MCV RBC AUTO: 98.6 FL (ref 80–99)
MONOCYTES # BLD AUTO: 2.45 10*3/MM3 (ref 0–1)
MONOCYTES NFR BLD AUTO: 12 % (ref 0–12)
NEUTROPHILS # BLD AUTO: 14.73 10*3/MM3 (ref 1.5–8.3)
NEUTROPHILS NFR BLD AUTO: 72.2 % (ref 41–71)
PHOSPHATE SERPL-MCNC: 2.8 MG/DL (ref 2.4–5.1)
PLATELET # BLD AUTO: 163 10*3/MM3 (ref 150–450)
PMV BLD AUTO: 10.2 FL (ref 6–12)
POTASSIUM BLD-SCNC: 4.6 MMOL/L (ref 3.5–5.5)
RBC # BLD AUTO: 2.86 10*6/MM3 (ref 4.2–5.76)
SODIUM BLD-SCNC: 137 MMOL/L (ref 132–146)
UNIT  ABO: NORMAL
UNIT  ABO: NORMAL
UNIT  RH: NORMAL
UNIT  RH: NORMAL
WBC NRBC COR # BLD: 20.42 10*3/MM3 (ref 3.5–10.8)

## 2017-07-01 PROCEDURE — 71010 HC CHEST PA OR AP: CPT

## 2017-07-01 PROCEDURE — 84100 ASSAY OF PHOSPHORUS: CPT | Performed by: THORACIC SURGERY (CARDIOTHORACIC VASCULAR SURGERY)

## 2017-07-01 PROCEDURE — 25010000002 MORPHINE SULFATE (PF) 2 MG/ML SOLUTION

## 2017-07-01 PROCEDURE — 99232 SBSQ HOSP IP/OBS MODERATE 35: CPT | Performed by: INTERNAL MEDICINE

## 2017-07-01 PROCEDURE — 85025 COMPLETE CBC W/AUTO DIFF WBC: CPT | Performed by: THORACIC SURGERY (CARDIOTHORACIC VASCULAR SURGERY)

## 2017-07-01 PROCEDURE — 80048 BASIC METABOLIC PNL TOTAL CA: CPT | Performed by: PHYSICIAN ASSISTANT

## 2017-07-01 PROCEDURE — 63710000001 INSULIN LISPRO (HUMAN) PER 5 UNITS

## 2017-07-01 PROCEDURE — 83735 ASSAY OF MAGNESIUM: CPT | Performed by: THORACIC SURGERY (CARDIOTHORACIC VASCULAR SURGERY)

## 2017-07-01 PROCEDURE — 82962 GLUCOSE BLOOD TEST: CPT

## 2017-07-01 RX ADMIN — OXYCODONE HYDROCHLORIDE 10 MG: 5 TABLET ORAL at 16:20

## 2017-07-01 RX ADMIN — HYDROCODONE BITARTRATE AND ACETAMINOPHEN 2 TABLET: 7.5; 325 TABLET ORAL at 20:24

## 2017-07-01 RX ADMIN — Medication 2 TABLET: at 18:09

## 2017-07-01 RX ADMIN — Medication 2 TABLET: at 08:16

## 2017-07-01 RX ADMIN — MORPHINE SULFATE 2 MG: 2 INJECTION, SOLUTION INTRAMUSCULAR; INTRAVENOUS at 02:27

## 2017-07-01 RX ADMIN — AMIODARONE HYDROCHLORIDE 200 MG: 200 TABLET ORAL at 12:11

## 2017-07-01 RX ADMIN — FAMOTIDINE 20 MG: 20 TABLET ORAL at 08:16

## 2017-07-01 RX ADMIN — AMIODARONE HYDROCHLORIDE 200 MG: 200 TABLET ORAL at 02:27

## 2017-07-01 RX ADMIN — OXYCODONE HYDROCHLORIDE 10 MG: 5 TABLET ORAL at 12:11

## 2017-07-01 RX ADMIN — OXYCODONE HYDROCHLORIDE 10 MG: 5 TABLET ORAL at 08:08

## 2017-07-01 RX ADMIN — OXYCODONE HYDROCHLORIDE 10 MG: 5 TABLET ORAL at 20:23

## 2017-07-01 RX ADMIN — ASPIRIN 325 MG ORAL TABLET 325 MG: 325 PILL ORAL at 08:16

## 2017-07-01 RX ADMIN — INSULIN LISPRO 2 UNITS: 100 INJECTION, SOLUTION INTRAVENOUS; SUBCUTANEOUS at 18:12

## 2017-07-01 RX ADMIN — FAMOTIDINE 20 MG: 20 TABLET ORAL at 18:09

## 2017-07-01 RX ADMIN — HYDROCODONE BITARTRATE AND ACETAMINOPHEN 2 TABLET: 7.5; 325 TABLET ORAL at 03:46

## 2017-07-01 RX ADMIN — ATORVASTATIN CALCIUM 40 MG: 40 TABLET, FILM COATED ORAL at 20:23

## 2017-07-01 NOTE — PROGRESS NOTES
"[facility] at [facility]  IP Progress Note      Chief Complaint: Coronary Artery Disease    Subjective:  No chest pain.  Overall he feels good.  He feels like he's doing very well.  His speech is somewhat garbled secondary to his previous stroke.    Objective:  Blood pressure 98/67, pulse 81, temperature 97.9 °F (36.6 °C), temperature source Axillary, resp. rate 20, height 67\" (170.2 cm), weight 162 lb 6 oz (73.7 kg), SpO2 96 %.     Intake/Output Summary (Last 24 hours) at 07/01/17 1204  Last data filed at 07/01/17 1000   Gross per 24 hour   Intake              902 ml   Output             2315 ml   Net            -1413 ml       Physical Exam:    General: Alert and oriented  Cardiovascular: Heart has a nondisplaced focal PMI. Regular rate and rhythm without murmur, gallop or rub.  Lungs: Clear without rales or wheezes. Equal expansion is noted.   Extremities: Show no edema.  Skin: warm and dry.  Neurologic: Dysarthria      Results Review:     I reviewed the patient's new clinical results.      Results from last 7 days  Lab Units 07/01/17  0337   WBC 10*3/mm3 20.42*   HEMOGLOBIN g/dL 9.3*   HEMATOCRIT % 28.2*   PLATELETS 10*3/mm3 163       Results from last 7 days  Lab Units 07/01/17  0337  06/27/17  1248   SODIUM mmol/L 137  < > 137   POTASSIUM mmol/L 4.6  < > 4.3   CHLORIDE mmol/L 103  < > 107   CO2 mmol/L 30.0  < > 28.0   BUN mg/dL 15  < > 14   CREATININE mg/dL 0.80  < > 1.00   CALCIUM mg/dL 9.8  < > 10.3   BILIRUBIN mg/dL  --   --  0.4   ALK PHOS U/L  --   --  70   ALT (SGPT) U/L  --   --  11   AST (SGOT) U/L  --   --  14   GLUCOSE mg/dL 125*  < > 80   < > = values in this interval not displayed.    Results from last 7 days  Lab Units 07/01/17  0337   SODIUM mmol/L 137   POTASSIUM mmol/L 4.6   CHLORIDE mmol/L 103   CO2 mmol/L 30.0   BUN mg/dL 15   CREATININE mg/dL 0.80   GLUCOSE mg/dL 125*   CALCIUM mg/dL 9.8       Results from last 7 days  Lab Units 06/29/17  0353 06/28/17  1717 " 06/27/17  1248   INR  1.01 1.14 0.97     No results found for: CKTOTAL, CKMB, CKMBINDEX, TROPONINI, TROPONINT                Tele: Sinus Rhythm    Assessment:  1. CAD POD#3 after CABG×4, normal EF Dr. Anthony  2. HTN: Controlled, low dose BB.  3. Dyslipidemia: Statin  4. History of CVA with residual aphasia/ Remote CEA  5. Postsurgical anemia.  6. COPD, Remote tobacco: Pulm timothy    7. Chronic Pain   8. Reported Intra op Afib, but no Afib post op CABG.Discontinue amiodarone      Plan:  · To telemetry in a.m.  · Pulmonary toilet/ambulate    Will Marilee LABOY I, Swapna Selby MD, have reviewed the note in full and agree with all aspects of the above including physical exam, assessment, labs and plan with changes made accordingly.    Swapna Selby MD, FACC

## 2017-07-01 NOTE — PROGRESS NOTES
Pulmonary/Critical Care Follow-up     LOS: 3 days   Patient Care Team:  Félix Blair MD as PCP - General (Internal Medicine)    Chief Complaint:  Coronary artery disease    Subjective   Aly Gabriel III is a 67 y.o. male, former smoker, status post CABG on 6/29/17.  He has a past medical history significant for CVA in 2014 with residual receptive and expressive aphasia.  History of chronic neck and back pain with chronic narcotic therapy.    Interval History:   Patient has cough which is mostly nonproductive today and has had worsening breath sounds per his nurse.  He is using incentive spirometer but achieving poor volumes.  He is tolerating oral intake, pain control appropriate, ambulating with assistance.  He has expressive aphasia so detailed history is unobtainable.    History taken from:  Chart / staff.     PMH/FH/Social History were reviewed and updated appropriately in the electronic medical record.     Review of Systems:    Review of 14 systems was completed with positives and pertinent negatives noted in the subjective section.  All other systems reviewed and are negative.         Objective     Vital Signs  Temp:  [98.2 °F (36.8 °C)-99.2 °F (37.3 °C)] 98.5 °F (36.9 °C)  Heart Rate:  [] 90  Resp:  [16-24] 18  BP: ()/(46-75) 129/75  06/30 0701 - 07/01 0700  In: 1077 [I.V.:753]  Out: 2495 [Urine:2495]  Body mass index is 25.43 kg/(m^2).     Physical Exam:     Constitutional:    Alert, cooperative, in no acute distress   Head:    Normocephalic, without obvious abnormality, atraumatic   Eyes:            Conjunctivae and sclerae normal, no   icterus, no pallor, corneas clear, PER   ENMT:   Ears appear intact with no abnormalities noted      No oral lesions, no thrush, oral mucosa moist   Neck:   No adenopathy, supple, trachea midline, no thyromegaly, no JVD       Lungs/Resp:     Normal effort, symmetric chest rise, no crepitus, moderate bilateral rhonchi with diminished breath sounds at left  base, no chest wall tenderness, non-productive cough.                 Heart/CV:    Regular rhythm and normal rate, normal S1 and S2, no            murmur   Abdomen/GI:     Normal bowel sounds, no masses, no organomegaly, soft        non-tender, non-distended   :     Deferred   Extremities/MSK:   No clubbing or cyanosis.  No edema.  Normal tone.  No deformities.    Pulses:   Pulses palpable and equal bilaterally   Skin:   No bleeding, bruising or rash   Heme/Lymph:   No cervical or supraclavicular adenopathy.    Neurologic:    Psychiatric:       Patient is alert. (Expressive aphasia).      Calm, appropriate            Results Review:    I reviewed the patient's new clinical results.     Results from last 7 days  Lab Units 07/01/17 0337 06/30/17 0319 06/29/17 0353 06/27/17  1248   SODIUM mmol/L 137 135 139  < > 137   POTASSIUM mmol/L 4.6 4.0 4.1  < > 4.3   CHLORIDE mmol/L 103 107 109  < > 107   CO2 mmol/L 30.0 25.0 23.0  < > 28.0   BUN mg/dL 15 11 16  < > 14   CREATININE mg/dL 0.80 0.80 1.30  < > 1.00   CALCIUM mg/dL 9.8 9.1 9.3  < > 10.3   BILIRUBIN mg/dL  --   --   --   --  0.4   ALK PHOS U/L  --   --   --   --  70   ALT (SGPT) U/L  --   --   --   --  11   AST (SGOT) U/L  --   --   --   --  14   GLUCOSE mg/dL 125* 130* 132*  < > 80   < > = values in this interval not displayed.    Results from last 7 days  Lab Units 07/01/17 0337 06/30/17 0319 06/29/17  0353   WBC 10*3/mm3 20.42* 17.60* 14.57*   HEMOGLOBIN g/dL 9.3* 8.0* 7.9*   HEMATOCRIT % 28.2* 23.9* 24.4*   PLATELETS 10*3/mm3 163 169 240       Results from last 7 days  Lab Units 06/29/17  0309   PH, ARTERIAL pH units 7.376   PO2 ART mm Hg 68.4*   PCO2, ARTERIAL mm Hg 41.0   HCO3 ART mmol/L 24.1       Results from last 7 days  Lab Units 07/01/17  0337 06/29/17  0353 06/28/17  2049   MAGNESIUM mg/dL 2.1 3.1* 3.7*   PHOSPHORUS mg/dL 2.8 4.3 3.5       I reviewed the patient's new imaging including images and reports.  Chest x-ray today: Persistent left base  atelectasis with mild basilar airspace disease bilaterally.  Right internal jugular catheter remains in position.  Chest tubes have been removed.  Awaiting radiologist reading.     Chest x-ray 6/30/17 : Slightly worsening left lobar airspace disease obscuring the diaphragm.    Medication Review:     amiodarone 200 mg Oral Q8H   Followed by      [START ON 7/6/2017] amiodarone 200 mg Oral Q12H   Followed by      [START ON 7/20/2017] amiodarone 200 mg Oral Daily   aspirin 325 mg Oral Daily   atorvastatin 40 mg Oral Nightly   famotidine 20 mg Oral BID   insulin lispro 0-9 Units Subcutaneous 4x Daily AC & at Bedtime   metoprolol tartrate 12.5 mg Oral Q12H   pharmacy consult - MTM  Does not apply Daily   sennosides-docusate sodium 2 tablet Oral BID       dexmedetomidine 0.2-1.5 mcg/kg/hr Last Rate: Stopped (06/30/17 1400)   niCARdipine 5-15 mg/hr    nitroglycerin 5-200 mcg/min Last Rate: Stopped (06/28/17 1756)   norepinephrine 0.02-0.3 mcg/kg/min Last Rate: Stopped (07/01/17 0815)   phenylephrine 0.5-3 mcg/kg/min        Assessment/Plan     Principal Problem:    Coronary artery disease (S/P CABGx4)  Active Problems:    Hyperlipidemia    Hypertension    Combined receptive/expressive 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  He does however appear to understand and is able to verbalize somewhat his understanding.  His pain is under control, and he is ambulating with assistance.  His white count however has increased since yesterday and I'm concerned this may be related to poor pulmonary toilet.  He is able to clear secretions with a good cough and I have discussed this with him.  He should continue aggressive pulmonary hygiene with every hour incentive spirometer, cough, deep  breathe.  We may add nebs if needed.  Norepinephrine has been weaned off.    1.  Continue pain control secondary to history of chronic pain at home.  2.  Continue ambulation, out of bed to chair, mobilize frequently  3.  Increase use of incentive spirometer, aggressive pulmonary hygiene, cough/deep breathe   4. We will continue to follow with ICU management  5. CXR now (done).   6. CXR in am.   7. CBC and BMP in am.     DINA Martin  07/01/17  10:04 AM  Aris Bonds MD    I performed an independent history and physical examination. Portions of the history were obtained by DINA Bach and were modified by me according to my findings. The above note reflects my findings, assessment, and plan.    Aris Bonds MD        *. Please note that portions of this note were completed with a voice recognition program. Efforts were made to edit the dictations, but occasionally words are mistranscribed.

## 2017-07-01 NOTE — PROGRESS NOTES
"Adult Nutrition  Assessment/PES    Patient Name:  Aly Gabriel III  YOB: 1950  MRN: [id number]  Admit Date:  6/28/2017    Assessment Date:  6/30/2017        Reason for Assessment       06/30/17 2110    Reason for Assessment    Reason For Assessment/Visit multidisciplinary rounds    Time Spent (min) 20    Cardiac CABG;HTN;Hypercholesterolemia    Hematological Anemia   macrocytic pattern    Neurological CVA   by hx               Nutrition/Diet History       06/30/17 2110    Nutrition/Diet History    Reported/Observed By RN    Other Will adv diet pending SLP eval.            Anthropometrics       06/30/17 2119    Anthropometrics    Height 170.2 cm (67\")    Weight 73.7 kg (162 lb 6 oz)    Ideal Body Weight (IBW)    Ideal Body Weight (IBW), Male (kg) 68.1    % Ideal Body Weight 108.38    Body Mass Index (BMI)    BMI (kg/m2) 25.48            Labs/Tests/Procedures/Meds       06/30/17 2111    Labs/Tests/Procedures/Meds    Labs/Tests Review Reviewed    Procedure Review SLP                Nutrition Prescription Ordered       06/30/17 2111    Nutrition Prescription PO    Current PO Diet Regular    Common Modifiers Cardiac            Evaluation of Received Nutrient/Fluid Intake       06/30/17 2111    PO Evaluation    Number of Days PO Intake Evaluated Insufficient Data              Problem/Interventions:        Problem 1       06/30/17 2112    Nutrition Diagnoses Problem 1    Problem 1 No Nutrition Diagnosis at this Time   pending intake progression                    Intervention Goal       06/30/17 2113    Intervention Goal    General Nutrition support treatment            Nutrition Intervention       06/30/17 2113    Nutrition Intervention    RD/Tech Action Follow Tx progress;Care plan reviewd   see per protocol after diet order filled              Education/Evaluation       06/30/17 2115    Monitor/Evaluation    Monitor Per protocol        Comments:      Electronically signed by:  Michaela Patton RD  06/30/17 " 9:26 PM

## 2017-07-01 NOTE — PLAN OF CARE
Problem: Patient Care Overview (Adult)  Goal: Plan of Care Review  Outcome: Ongoing (interventions implemented as appropriate)    07/01/17 1802   Coping/Psychosocial Response Interventions   Plan Of Care Reviewed With patient;sibling   Patient Care Overview   Progress progress toward functional goals as expected   Outcome Evaluation   Outcome Summary/Follow up Plan Weened off levo, ambulated 2x in hallway for 400ft total, pulmonary toilet, pt is poor at IS[facility]         Problem: Cardiac Surgery (Adult)  Goal: Signs and Symptoms of Listed Potential Problems Will be Absent or Manageable (Cardiac Surgery)  Outcome: Ongoing (interventions implemented as appropriate)    07/01/17 1802   Cardiac Surgery   Problems Assessed (Cardiac Surgery) pain;situational response   Problems Present (Cardiac Surgery) pain;situational response         Problem: Pressure Ulcer Risk (Delroy Scale) (Adult,Obstetrics,Pediatric)  Goal: Identify Related Risk Factors and Signs and Symptoms  Outcome: Ongoing (interventions implemented as appropriate)    07/01/17 1802   Pressure Ulcer Risk (Delroy Scale)   Related Risk Factors (Pressure Ulcer Risk (Delroy Scale)) cognitive impairment;critical care admission       Goal: Skin Integrity  Outcome: Ongoing (interventions implemented as appropriate)    07/01/17 1802   Pressure Ulcer Risk (Delroy Scale) (Adult,Obstetrics,Pediatric)   Skin Integrity making progress toward outcome

## 2017-07-01 NOTE — PROGRESS NOTES
"   LOS: 3 days   Patient Care Team:  Félix Blair MD as PCP - General (Internal Medicine)    Subjective alert no complaints    Objective    Vital Sign Min/Max for last 24 hours  Temp  Min: 98.2 °F (36.8 °C)  Max: 99 °F (37.2 °C)   BP  Min: 81/57  Max: 130/72   Pulse  Min: 76  Max: 96   Resp  Min: 16  Max: 24   SpO2  Min: 90 %  Max: 100 %   Flow (L/min)  Min: 2  Max: 4   Weight  Min: 162 lb 6 oz (73.7 kg)  Max: 162 lb 6 oz (73.7 kg)     Flowsheet Rows         First Filed Value    Admission Height  67\" (170.2 cm) Documented at 06/28/2017 2000    Admission Weight  162 lb 6 oz (73.7 kg) Documented at 06/28/2017 2000          Physical Exam:    Wound:Satisfactory    Pulses:     Mediastinal and Chest Tube Drainage:       Results Review:     Results from last 7 days  Lab Units 07/01/17  0337   WBC 10*3/mm3 20.42*   HEMOGLOBIN g/dL 9.3*   HEMATOCRIT % 28.2*   PLATELETS 10*3/mm3 163       Results from last 7 days  Lab Units 07/01/17  0337   SODIUM mmol/L 137   POTASSIUM mmol/L 4.6   CHLORIDE mmol/L 103   CO2 mmol/L 30.0   BUN mg/dL 15   CREATININE mg/dL 0.80   GLUCOSE mg/dL 125*   CALCIUM mg/dL 9.8       Results from last 7 days  Lab Units 06/29/17  0309   PH, ARTERIAL pH units 7.376   PO2 ART mm Hg 68.4*   PCO2, ARTERIAL mm Hg 41.0   HCO3 ART mmol/L 24.1         Assessment    Principal Problem:    Coronary artery disease (S/P CABGx4)  Active Problems:    Hyperlipidemia    Hypertension    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 Efforts were made to edit the dictations, but words may be mistranscribed  "

## 2017-07-01 NOTE — PLAN OF CARE
Problem: Patient Care Overview (Adult)  Goal: Plan of Care Review  Outcome: Ongoing (interventions implemented as appropriate)    06/30/17 1131 07/01/17 0100   Coping/Psychosocial Response Interventions   Plan Of Care Reviewed With --  patient;family   Patient Care Overview   Progress improving  (re-eval) --    Outcome Evaluation   Outcome Summary/Follow up Plan --  Patient pressure is labile. patient was diuresed on day shift. Was bathed and put back to bed. Will get patient up to the chair in the morning. Had to restart low dose levophed for pressure support. Will draw am labs on patient.        Goal: Adult Individualization and Mutuality  Outcome: Ongoing (interventions implemented as appropriate)    Problem: Cardiac Surgery (Adult)  Goal: Signs and Symptoms of Listed Potential Problems Will be Absent or Manageable (Cardiac Surgery)  Outcome: Ongoing (interventions implemented as appropriate)    06/29/17 0534 06/29/17 1833   Cardiac Surgery   Problems Assessed (Cardiac Surgery) --  all   Problems Present (Cardiac Surgery) pain;hemodynamic instability;fluid imbalance;dysrhythmia/arrhythmia;situational response --          Problem: Pressure Ulcer Risk (Delroy Scale) (Adult,Obstetrics,Pediatric)  Goal: Identify Related Risk Factors and Signs and Symptoms  Outcome: Ongoing (interventions implemented as appropriate)    06/29/17 0534   Pressure Ulcer Risk (Delroy Scale)   Related Risk Factors (Pressure Ulcer Risk (Delroy Scale)) age extremes;critical care admission;fluid intake inadequate;length of surgery;medical devices       Goal: Skin Integrity  Outcome: Ongoing (interventions implemented as appropriate)    07/01/17 0100   Pressure Ulcer Risk (Delroy Scale) (Adult,Obstetrics,Pediatric)   Skin Integrity making progress toward outcome

## 2017-07-02 ENCOUNTER — APPOINTMENT (OUTPATIENT)
Dept: GENERAL RADIOLOGY | Facility: HOSPITAL | Age: 67
End: 2017-07-02

## 2017-07-02 LAB
ANION GAP SERPL CALCULATED.3IONS-SCNC: 4 MMOL/L (ref 3–11)
BASOPHILS # BLD AUTO: 0.02 10*3/MM3 (ref 0–0.2)
BASOPHILS NFR BLD AUTO: 0.1 % (ref 0–1)
BUN BLD-MCNC: 13 MG/DL (ref 9–23)
BUN/CREAT SERPL: 14.4 (ref 7–25)
CALCIUM SPEC-SCNC: 9.5 MG/DL (ref 8.7–10.4)
CHLORIDE SERPL-SCNC: 100 MMOL/L (ref 99–109)
CO2 SERPL-SCNC: 32 MMOL/L (ref 20–31)
CREAT BLD-MCNC: 0.9 MG/DL (ref 0.6–1.3)
DEPRECATED RDW RBC AUTO: 59.8 FL (ref 37–54)
EOSINOPHIL # BLD AUTO: 0.34 10*3/MM3 (ref 0–0.3)
EOSINOPHIL NFR BLD AUTO: 2.4 % (ref 0–3)
ERYTHROCYTE [DISTWIDTH] IN BLOOD BY AUTOMATED COUNT: 16.3 % (ref 11.3–14.5)
GFR SERPL CREATININE-BSD FRML MDRD: 84 ML/MIN/1.73
GLUCOSE BLD-MCNC: 126 MG/DL (ref 70–100)
GLUCOSE BLDC GLUCOMTR-MCNC: 120 MG/DL (ref 70–130)
GLUCOSE BLDC GLUCOMTR-MCNC: 129 MG/DL (ref 70–130)
GLUCOSE BLDC GLUCOMTR-MCNC: 141 MG/DL (ref 70–130)
HCT VFR BLD AUTO: 26.6 % (ref 38.9–50.9)
HGB BLD-MCNC: 8.7 G/DL (ref 13.1–17.5)
IMM GRANULOCYTES # BLD: 0.05 10*3/MM3 (ref 0–0.03)
IMM GRANULOCYTES NFR BLD: 0.4 % (ref 0–0.6)
LYMPHOCYTES # BLD AUTO: 1.88 10*3/MM3 (ref 0.6–4.8)
LYMPHOCYTES NFR BLD AUTO: 13.2 % (ref 24–44)
MCH RBC QN AUTO: 32.6 PG (ref 27–31)
MCHC RBC AUTO-ENTMCNC: 32.7 G/DL (ref 32–36)
MCV RBC AUTO: 99.6 FL (ref 80–99)
MONOCYTES # BLD AUTO: 1.98 10*3/MM3 (ref 0–1)
MONOCYTES NFR BLD AUTO: 13.9 % (ref 0–12)
NEUTROPHILS # BLD AUTO: 9.95 10*3/MM3 (ref 1.5–8.3)
NEUTROPHILS NFR BLD AUTO: 70 % (ref 41–71)
PLAT MORPH BLD: NORMAL
PLATELET # BLD AUTO: 187 10*3/MM3 (ref 150–450)
PMV BLD AUTO: 9.9 FL (ref 6–12)
POTASSIUM BLD-SCNC: 4.1 MMOL/L (ref 3.5–5.5)
RBC # BLD AUTO: 2.67 10*6/MM3 (ref 4.2–5.76)
RBC MORPH BLD: NORMAL
SODIUM BLD-SCNC: 136 MMOL/L (ref 132–146)
WBC MORPH BLD: NORMAL
WBC NRBC COR # BLD: 14.22 10*3/MM3 (ref 3.5–10.8)

## 2017-07-02 PROCEDURE — 80048 BASIC METABOLIC PNL TOTAL CA: CPT | Performed by: PHYSICIAN ASSISTANT

## 2017-07-02 PROCEDURE — 71010 HC CHEST PA OR AP: CPT

## 2017-07-02 PROCEDURE — 82962 GLUCOSE BLOOD TEST: CPT

## 2017-07-02 PROCEDURE — 85007 BL SMEAR W/DIFF WBC COUNT: CPT | Performed by: INTERNAL MEDICINE

## 2017-07-02 PROCEDURE — 99232 SBSQ HOSP IP/OBS MODERATE 35: CPT | Performed by: INTERNAL MEDICINE

## 2017-07-02 PROCEDURE — 85025 COMPLETE CBC W/AUTO DIFF WBC: CPT | Performed by: INTERNAL MEDICINE

## 2017-07-02 PROCEDURE — 94799 UNLISTED PULMONARY SVC/PX: CPT

## 2017-07-02 PROCEDURE — 99233 SBSQ HOSP IP/OBS HIGH 50: CPT | Performed by: INTERNAL MEDICINE

## 2017-07-02 RX ADMIN — FAMOTIDINE 20 MG: 20 TABLET ORAL at 17:17

## 2017-07-02 RX ADMIN — SODIUM CHLORIDE 250 ML: 9 INJECTION, SOLUTION INTRAVENOUS at 15:48

## 2017-07-02 RX ADMIN — OXYCODONE HYDROCHLORIDE 10 MG: 5 TABLET ORAL at 21:11

## 2017-07-02 RX ADMIN — OXYCODONE HYDROCHLORIDE 10 MG: 5 TABLET ORAL at 00:14

## 2017-07-02 RX ADMIN — HYDROCODONE BITARTRATE AND ACETAMINOPHEN 2 TABLET: 7.5; 325 TABLET ORAL at 04:31

## 2017-07-02 RX ADMIN — HYDROCODONE BITARTRATE AND ACETAMINOPHEN 2 TABLET: 7.5; 325 TABLET ORAL at 12:05

## 2017-07-02 RX ADMIN — OXYCODONE HYDROCHLORIDE 10 MG: 5 TABLET ORAL at 17:18

## 2017-07-02 RX ADMIN — ATORVASTATIN CALCIUM 40 MG: 40 TABLET, FILM COATED ORAL at 21:11

## 2017-07-02 RX ADMIN — SODIUM CHLORIDE 500 ML: 9 INJECTION, SOLUTION INTRAVENOUS at 21:14

## 2017-07-02 RX ADMIN — FAMOTIDINE 20 MG: 20 TABLET ORAL at 08:01

## 2017-07-02 RX ADMIN — Medication 0.02 MCG/KG/MIN: at 15:54

## 2017-07-02 RX ADMIN — SODIUM CHLORIDE 250 ML: 9 INJECTION, SOLUTION INTRAVENOUS at 16:39

## 2017-07-02 RX ADMIN — HYDROCODONE BITARTRATE AND ACETAMINOPHEN 2 TABLET: 7.5; 325 TABLET ORAL at 19:55

## 2017-07-02 RX ADMIN — Medication 2 TABLET: at 08:01

## 2017-07-02 RX ADMIN — OXYCODONE HYDROCHLORIDE 10 MG: 5 TABLET ORAL at 08:01

## 2017-07-02 RX ADMIN — Medication 2 TABLET: at 17:18

## 2017-07-02 RX ADMIN — ASPIRIN 325 MG ORAL TABLET 325 MG: 325 PILL ORAL at 08:01

## 2017-07-02 NOTE — PROGRESS NOTES
"[facility] at [facility]  IP Progress Note      Chief Complaint: Coronary Artery Disease    Subjective:  Doing well overall.  There is been no documented recurrence of atrial fibrillation which was reported that not documented that we can find in OR.  He is having a hard time using the incentive spirometer.    Objective:  Blood pressure (!) 81/50, pulse 82, temperature 98.8 °F (37.1 °C), temperature source Axillary, resp. rate 14, height 67\" (170.2 cm), weight 162 lb 6 oz (73.7 kg), SpO2 95 %.     Intake/Output Summary (Last 24 hours) at 07/02/17 1051  Last data filed at 07/02/17 0600   Gross per 24 hour   Intake             33.2 ml   Output              375 ml   Net           -341.8 ml       Physical Exam:    General: Alert and oriented  Cardiovascular: Heart has a nondisplaced focal PMI. Regular rate and rhythm without murmur, gallop or rub.  Lungs: Upper airway rhonchi are noted. Equal expansion is noted.   Extremities: Show no edema.  Skin: warm and dry.  Neurologic: Dysarthria      Results Review:     I reviewed the patient's new clinical results.      Results from last 7 days  Lab Units 07/02/17  0329   WBC 10*3/mm3 14.22*   HEMOGLOBIN g/dL 8.7*   HEMATOCRIT % 26.6*   PLATELETS 10*3/mm3 187       Results from last 7 days  Lab Units 07/02/17  0329  06/27/17  1248   SODIUM mmol/L 136  < > 137   POTASSIUM mmol/L 4.1  < > 4.3   CHLORIDE mmol/L 100  < > 107   CO2 mmol/L 32.0*  < > 28.0   BUN mg/dL 13  < > 14   CREATININE mg/dL 0.90  < > 1.00   CALCIUM mg/dL 9.5  < > 10.3   BILIRUBIN mg/dL  --   --  0.4   ALK PHOS U/L  --   --  70   ALT (SGPT) U/L  --   --  11   AST (SGOT) U/L  --   --  14   GLUCOSE mg/dL 126*  < > 80   < > = values in this interval not displayed.    Results from last 7 days  Lab Units 07/02/17  0329   SODIUM mmol/L 136   POTASSIUM mmol/L 4.1   CHLORIDE mmol/L 100   CO2 mmol/L 32.0*   BUN mg/dL 13   CREATININE mg/dL 0.90   GLUCOSE mg/dL 126*   CALCIUM mg/dL 9.5       Results " from last 7 days  Lab Units 06/29/17  0353 06/28/17  1717 06/27/17  1248   INR  1.01 1.14 0.97     No results found for: CKTOTAL, CKMB, CKMBINDEX, TROPONINI, TROPONINT                Tele: Sinus Rhythm    Assessment:  1. CAD POD#4 after CABG×4, normal EF Dr. Anthony  2. HTN: Controlled, low dose BB.  3. Dyslipidemia: Statin  4. History of CVA with residual aphasia/ Remote CEA  5. Postsurgical anemia.  6. COPD, Remote tobacco: Pulm toliet    7. Chronic Pain   8. Reported Intra op Afib, but no Afib post op CABG.Discontinue amiodarone      Plan:  · To telemetry in a.m.  · Pulmonary toilet/ambulate  · Flutter valve      Swapna Selby MD, FACC  July 2, 2017  10:55 AM

## 2017-07-02 NOTE — PROGRESS NOTES
Pulmonary/Critical Care Follow-up     LOS: 4 days   Patient Care Team:  Félix Blair MD as PCP - General (Internal Medicine)    Chief Complaint:  Coronary artery disease    Subjective   Aly Gabriel III is a 67 y.o. male, former smoker, status post CABG on 6/29/17. He has a past medical history significant for CVA in 2014 with residual receptive and expressive aphasia. History of chronic neck and back pain with chronic narcotic therapy.    Interval History:   No issues overnight.  Patient was out of bed to chair eating.  No documented recurrence of atrial fibrillation.  Per report he has some difficulty using the incentive spirometer.  He has had a decrease in blood pressure since ambulating today and received half a liter bolus and is restarted on a low dose of norepinephrine.  Patient denies nausea/vomiting, chest pain/shortness of air, he does have some concerns about his blood pressure.    History taken from: Chart, patient    PMH/FH/Social History were reviewed and updated appropriately in the electronic medical record.     Review of Systems:    Review of 14 systems was completed with positives and pertinent negatives noted in the subjective section.  All other systems reviewed and are negative.   Exceptions are noted below:  Unable to obtain secondary to expressive aphasia.      Objective     Vital Signs  Temp:  [97.9 °F (36.6 °C)-98.8 °F (37.1 °C)] 97.9 °F (36.6 °C)  Heart Rate:  [] 81  Resp:  [14-24] 14  BP: ()/(40-95) 98/67  07/01 0701 - 07/02 0700  In: 92.2 [I.V.:92.2]  Out: 1225 [Urine:1225]  Body mass index is 25.43 kg/(m^2).     Physical Exam:     Constitutional:    Alert, cooperative, in no acute distress, up in chair    Head:    Normocephalic, without obvious abnormality, atraumatic   Eyes:            Lids and lashes normal, conjunctivae and sclerae normal, no   icterus, no pallor, corneas clear, PER   ENMT:   Ears appear intact with no abnormalities noted      No oral lesions, no  thrush, oral mucosa moist   Neck:   No adenopathy, supple, trachea midline, no thyromegaly, no JVD, Right internal jugular catheter in place.         Lungs/Resp:     Normal effort, symmetric chest rise, no crepitus, decreased breath sounds at left apex, no chest wall tenderness                Heart/CV:    Regular rhythm and normal rate, normal S1 and S2, no            murmur   Abdomen/GI:     Normal bowel sounds, no masses, no organomegaly, soft        non-tender, non-distended   :     Deferred   Extremities/MSK:   No clubbing or cyanosis.  No edema.  Normal tone.  No deformities.    Pulses:   Pulses palpable and equal bilaterally   Skin:   No bleeding, bruising or rash   Heme/Lymph:   No cervical or supraclavicular adenopathy.    Neurologic:    Psychiatric:       Moves all extremities with no obvious focal motor deficit.  Cranial nerves 2 - 12 grossly intact   Alert has some expressive aphasia but is able to relate his concerns.  He is calm.            Results Review:     I reviewed the patient's new clinical results.     Results from last 7 days  Lab Units 07/02/17 0329 07/01/17 0337 06/30/17 0319 06/27/17  1248   SODIUM mmol/L 136 137 135  < > 137   POTASSIUM mmol/L 4.1 4.6 4.0  < > 4.3   CHLORIDE mmol/L 100 103 107  < > 107   CO2 mmol/L 32.0* 30.0 25.0  < > 28.0   BUN mg/dL 13 15 11  < > 14   CREATININE mg/dL 0.90 0.80 0.80  < > 1.00   CALCIUM mg/dL 9.5 9.8 9.1  < > 10.3   BILIRUBIN mg/dL  --   --   --   --  0.4   ALK PHOS U/L  --   --   --   --  70   ALT (SGPT) U/L  --   --   --   --  11   AST (SGOT) U/L  --   --   --   --  14   GLUCOSE mg/dL 126* 125* 130*  < > 80   < > = values in this interval not displayed.    Results from last 7 days  Lab Units 07/02/17 0329 07/01/17 0337 06/30/17 0319   WBC 10*3/mm3 14.22* 20.42* 17.60*   HEMOGLOBIN g/dL 8.7* 9.3* 8.0*   HEMATOCRIT % 26.6* 28.2* 23.9*   PLATELETS 10*3/mm3 187 163 169       Results from last 7 days  Lab Units 06/29/17  0309   PH, ARTERIAL pH units  7.376   PO2 ART mm Hg 68.4*   PCO2, ARTERIAL mm Hg 41.0   HCO3 ART mmol/L 24.1       Results from last 7 days  Lab Units 07/01/17  0337 06/29/17  0353 06/28/17  2049   MAGNESIUM mg/dL 2.1 3.1* 3.7*   PHOSPHORUS mg/dL 2.8 4.3 3.5       I reviewed the patient's new imaging including images and reports.  CXR done today shows slight improvement in left sided pneumothorax. It is however quite sizeable.     Medication Review:     aspirin 325 mg Oral Daily   atorvastatin 40 mg Oral Nightly   famotidine 20 mg Oral BID   insulin lispro 0-9 Units Subcutaneous 4x Daily AC & at Bedtime   metoprolol tartrate 12.5 mg Oral Q12H   pharmacy consult - MTM  Does not apply Daily   sennosides-docusate sodium 2 tablet Oral BID       dexmedetomidine 0.2-1.5 mcg/kg/hr Last Rate: Stopped (06/30/17 1400)   niCARdipine 5-15 mg/hr    nitroglycerin 5-200 mcg/min Last Rate: Stopped (06/28/17 1756)   norepinephrine 0.02-0.3 mcg/kg/min Last Rate: 0.06 mcg/kg/min (07/02/17 1639)   phenylephrine 0.5-3 mcg/kg/min        Assessment/Plan     Principal Problem:    Coronary artery disease (S/P CABGx4)  Active Problems:    Hypertension    Chronic pain (Chronic narcs ?)    Paroxysmal atrial fibrillation    Hyperlipidemia    Combined receptive/expressive aphasia due to CVA 2014    Former smoker (0 x 2014)    This is a pleasant 67-year-old male status post coronary artery bypass graft ×4 with a past history significant for CVA in 2014, both expressive and receptive aphasia.     Patient has a left pneumothorax on chest x-ray yesterday and slightly improved today, although it is quite sizable.      Blood pressure is low today and patient was given 500 mL bolus.      1. Continue pain control secondary to history of chronic pain at home.  2. Continue ambulation, out of bed to chair, mobilize frequently  3. Increase use of incentive spirometer, aggressive pulmonary hygiene, cough/deep breathe   4.  Additional 500 mL normal saline.  5.  Repeat chest x-ray now and  in a.m.  6.  Labs in a.m.  7.  Wean norepinephrine as tolerated.    I discussed the patient with his wife at his bedside.  I also discussed the patient with Peter Almonte from the cardiothoracic surgery team and discussed the presence of the left pneumothorax with him.    MD Manav Pham APRN  07/02/17  4:51 PM  Aris Bonds MD    I performed an independent history and physical examination. Portions of the history were obtained by DINA Bach and were modified by me according to my findings. The above note reflects my findings, assessment, and plan.    Aris Bonds MD        *. Please note that portions of this note were completed with a voice recognition program. Efforts were made to edit the dictations, but occasionally words are mistranscribed.

## 2017-07-02 NOTE — PROGRESS NOTES
"   LOS: 4 days   Patient Care Team:  Félix Blair MD as PCP - General (Internal Medicine)    Subjective alert no complaints    Objective    Vital Sign Min/Max for last 24 hours  Temp  Min: 97.6 °F (36.4 °C)  Max: 98.7 °F (37.1 °C)   BP  Min: 82/59  Max: 144/79   Pulse  Min: 74  Max: 105   Resp  Min: 20  Max: 24   SpO2  Min: 88 %  Max: 100 %   Flow (L/min)  Min: 2  Max: 5   No Data Recorded     Flowsheet Rows         First Filed Value    Admission Height  67\" (170.2 cm) Documented at 06/28/2017 2000    Admission Weight  162 lb 6 oz (73.7 kg) Documented at 06/28/2017 2000          Physical Exam:    Wound:Satisfactory    Pulses:     Mediastinal and Chest Tube Drainage:       Results Review:     Results from last 7 days  Lab Units 07/02/17  0329   WBC 10*3/mm3 14.22*   HEMOGLOBIN g/dL 8.7*   HEMATOCRIT % 26.6*   PLATELETS 10*3/mm3 187       Results from last 7 days  Lab Units 07/02/17  0329   SODIUM mmol/L 136   POTASSIUM mmol/L 4.1   CHLORIDE mmol/L 100   CO2 mmol/L 32.0*   BUN mg/dL 13   CREATININE mg/dL 0.90   GLUCOSE mg/dL 126*   CALCIUM mg/dL 9.5       Results from last 7 days  Lab Units 06/29/17  0309   PH, ARTERIAL pH units 7.376   PO2 ART mm Hg 68.4*   PCO2, ARTERIAL mm Hg 41.0   HCO3 ART mmol/L 24.1         Assessment    Principal Problem:    Coronary artery disease (S/P CABGx4)  Active Problems:    Hyperlipidemia    Hypertension    Combined receptive/expressive aphasia due to CVA 2014    Former smoker (0 x 2014)    Chronic pain (Chronic narcs ?)    Paroxysmal atrial fibrillation      Continuing to improve    Chase Bales MD  07/02/17  7:23 AM      Please note that portions of this note were completed with a voice recognition program. Efforts were made to edit the dictations, but words may be mistranscribed  "

## 2017-07-02 NOTE — PLAN OF CARE
Problem: Patient Care Overview (Adult)  Goal: Plan of Care Review  Outcome: Ongoing (interventions implemented as appropriate)    07/02/17 1526   Coping/Psychosocial Response Interventions   Plan Of Care Reviewed With patient;spouse   Patient Care Overview   Progress improving   Outcome Evaluation   Outcome Summary/Follow up Plan ambulate in [place]. moderate appetite, pulm tolieting,          Problem: Cardiac Surgery (Adult)  Goal: Signs and Symptoms of Listed Potential Problems Will be Absent or Manageable (Cardiac Surgery)  Outcome: Ongoing (interventions implemented as appropriate)    07/01/17 1802   Cardiac Surgery   Problems Assessed (Cardiac Surgery) pain;situational response   Problems Present (Cardiac Surgery) pain;situational response         Problem: Pressure Ulcer Risk (Delroy Scale) (Adult,Obstetrics,Pediatric)  Goal: Identify Related Risk Factors and Signs and Symptoms  Outcome: Ongoing (interventions implemented as appropriate)    07/01/17 1802   Pressure Ulcer Risk (Delroy Scale)   Related Risk Factors (Pressure Ulcer Risk (Delroy Scale)) cognitive impairment;critical care admission       Goal: Skin Integrity  Outcome: Ongoing (interventions implemented as appropriate)    07/02/17 1526   Pressure Ulcer Risk (Delroy Scale) (Adult,Obstetrics,Pediatric)   Skin Integrity making progress toward outcome

## 2017-07-02 NOTE — PLAN OF CARE
Problem: Patient Care Overview (Adult)  Goal: Plan of Care Review    07/02/17 0505   Outcome Evaluation   Outcome Summary/Follow up Plan Pulmonary tolieting continued, been working on coughing and deep breathing as the pt seems to have a diffuculty with the IS       Goal: Adult Individualization and Mutuality    07/01/17 0100   Individualization   Patient Specific Preferences pain medicine needs to be given when available and as scheduled.          Problem: Cardiac Surgery (Adult)  Goal: Signs and Symptoms of Listed Potential Problems Will be Absent or Manageable (Cardiac Surgery)    07/01/17 1802   Cardiac Surgery   Problems Assessed (Cardiac Surgery) pain;situational response   Problems Present (Cardiac Surgery) pain;situational response         Problem: Pressure Ulcer Risk (Delroy Scale) (Adult,Obstetrics,Pediatric)  Goal: Identify Related Risk Factors and Signs and Symptoms    07/01/17 1802   Pressure Ulcer Risk (Delroy Scale)   Related Risk Factors (Pressure Ulcer Risk (Delroy Scale)) cognitive impairment;critical care admission       Goal: Skin Integrity    07/01/17 1802   Pressure Ulcer Risk (Delroy Scale) (Adult,Obstetrics,Pediatric)   Skin Integrity making progress toward outcome

## 2017-07-03 ENCOUNTER — APPOINTMENT (OUTPATIENT)
Dept: GENERAL RADIOLOGY | Facility: HOSPITAL | Age: 67
End: 2017-07-03

## 2017-07-03 PROBLEM — J93.9 PNEUMOTHORAX ON LEFT: Status: ACTIVE | Noted: 2017-07-03

## 2017-07-03 LAB
ALBUMIN SERPL-MCNC: 3.5 G/DL (ref 3.2–4.8)
ALBUMIN/GLOB SERPL: 1.5 G/DL (ref 1.5–2.5)
ALP SERPL-CCNC: 62 U/L (ref 25–100)
ALT SERPL W P-5'-P-CCNC: 21 U/L (ref 7–40)
ANION GAP SERPL CALCULATED.3IONS-SCNC: 8 MMOL/L (ref 3–11)
AST SERPL-CCNC: 22 U/L (ref 0–33)
BASOPHILS # BLD AUTO: 0.01 10*3/MM3 (ref 0–0.2)
BASOPHILS NFR BLD AUTO: 0.1 % (ref 0–1)
BILIRUB SERPL-MCNC: 0.5 MG/DL (ref 0.3–1.2)
BUN BLD-MCNC: 15 MG/DL (ref 9–23)
BUN/CREAT SERPL: 21.4 (ref 7–25)
CALCIUM SPEC-SCNC: 9.3 MG/DL (ref 8.7–10.4)
CHLORIDE SERPL-SCNC: 101 MMOL/L (ref 99–109)
CO2 SERPL-SCNC: 28 MMOL/L (ref 20–31)
CREAT BLD-MCNC: 0.7 MG/DL (ref 0.6–1.3)
DEPRECATED RDW RBC AUTO: 57.9 FL (ref 37–54)
EOSINOPHIL # BLD AUTO: 0.36 10*3/MM3 (ref 0–0.3)
EOSINOPHIL NFR BLD AUTO: 2.7 % (ref 0–3)
ERYTHROCYTE [DISTWIDTH] IN BLOOD BY AUTOMATED COUNT: 15.7 % (ref 11.3–14.5)
GFR SERPL CREATININE-BSD FRML MDRD: 112 ML/MIN/1.73
GLOBULIN UR ELPH-MCNC: 2.3 GM/DL
GLUCOSE BLD-MCNC: 121 MG/DL (ref 70–100)
GLUCOSE BLDC GLUCOMTR-MCNC: 125 MG/DL (ref 70–130)
GLUCOSE BLDC GLUCOMTR-MCNC: 138 MG/DL (ref 70–130)
GLUCOSE BLDC GLUCOMTR-MCNC: 139 MG/DL (ref 70–130)
HCT VFR BLD AUTO: 26.1 % (ref 38.9–50.9)
HGB BLD-MCNC: 8.6 G/DL (ref 13.1–17.5)
IMM GRANULOCYTES # BLD: 0.03 10*3/MM3 (ref 0–0.03)
IMM GRANULOCYTES NFR BLD: 0.2 % (ref 0–0.6)
LYMPHOCYTES # BLD AUTO: 1.41 10*3/MM3 (ref 0.6–4.8)
LYMPHOCYTES NFR BLD AUTO: 10.5 % (ref 24–44)
MCH RBC QN AUTO: 33.1 PG (ref 27–31)
MCHC RBC AUTO-ENTMCNC: 33 G/DL (ref 32–36)
MCV RBC AUTO: 100.4 FL (ref 80–99)
MONOCYTES # BLD AUTO: 2.06 10*3/MM3 (ref 0–1)
MONOCYTES NFR BLD AUTO: 15.3 % (ref 0–12)
NEUTROPHILS # BLD AUTO: 9.59 10*3/MM3 (ref 1.5–8.3)
NEUTROPHILS NFR BLD AUTO: 71.2 % (ref 41–71)
PLATELET # BLD AUTO: 247 10*3/MM3 (ref 150–450)
PMV BLD AUTO: 9.6 FL (ref 6–12)
POTASSIUM BLD-SCNC: 3.7 MMOL/L (ref 3.5–5.5)
PROT SERPL-MCNC: 5.8 G/DL (ref 5.7–8.2)
RBC # BLD AUTO: 2.6 10*6/MM3 (ref 4.2–5.76)
SODIUM BLD-SCNC: 137 MMOL/L (ref 132–146)
WBC NRBC COR # BLD: 13.46 10*3/MM3 (ref 3.5–10.8)

## 2017-07-03 PROCEDURE — 97110 THERAPEUTIC EXERCISES: CPT

## 2017-07-03 PROCEDURE — 71010 HC CHEST PA OR AP: CPT

## 2017-07-03 PROCEDURE — 25010000002 FUROSEMIDE PER 20 MG: Performed by: PHYSICIAN ASSISTANT

## 2017-07-03 PROCEDURE — 85025 COMPLETE CBC W/AUTO DIFF WBC: CPT | Performed by: THORACIC SURGERY (CARDIOTHORACIC VASCULAR SURGERY)

## 2017-07-03 PROCEDURE — 94799 UNLISTED PULMONARY SVC/PX: CPT

## 2017-07-03 PROCEDURE — 82962 GLUCOSE BLOOD TEST: CPT

## 2017-07-03 PROCEDURE — 99024 POSTOP FOLLOW-UP VISIT: CPT | Performed by: THORACIC SURGERY (CARDIOTHORACIC VASCULAR SURGERY)

## 2017-07-03 PROCEDURE — 25010000002 HEPARIN (PORCINE) PER 1000 UNITS: Performed by: INTERNAL MEDICINE

## 2017-07-03 PROCEDURE — 80053 COMPREHEN METABOLIC PANEL: CPT | Performed by: NURSE PRACTITIONER

## 2017-07-03 PROCEDURE — 94640 AIRWAY INHALATION TREATMENT: CPT

## 2017-07-03 PROCEDURE — 99232 SBSQ HOSP IP/OBS MODERATE 35: CPT | Performed by: INTERNAL MEDICINE

## 2017-07-03 PROCEDURE — 99233 SBSQ HOSP IP/OBS HIGH 50: CPT | Performed by: INTERNAL MEDICINE

## 2017-07-03 RX ORDER — IPRATROPIUM BROMIDE AND ALBUTEROL SULFATE 2.5; .5 MG/3ML; MG/3ML
3 SOLUTION RESPIRATORY (INHALATION)
Status: DISCONTINUED | OUTPATIENT
Start: 2017-07-03 | End: 2017-07-04 | Stop reason: HOSPADM

## 2017-07-03 RX ORDER — HYDROCODONE BITARTRATE AND ACETAMINOPHEN 7.5; 325 MG/1; MG/1
2 TABLET ORAL EVERY 4 HOURS PRN
Qty: 30 TABLET | Refills: 0 | Status: SHIPPED | OUTPATIENT
Start: 2017-07-03 | End: 2017-07-04 | Stop reason: HOSPADM

## 2017-07-03 RX ORDER — GUAIFENESIN 600 MG/1
1200 TABLET, EXTENDED RELEASE ORAL EVERY 12 HOURS SCHEDULED
Status: DISCONTINUED | OUTPATIENT
Start: 2017-07-03 | End: 2017-07-04 | Stop reason: HOSPADM

## 2017-07-03 RX ORDER — SODIUM CHLORIDE 0.9 % (FLUSH) 0.9 %
1-10 SYRINGE (ML) INJECTION EVERY 8 HOURS
Status: DISCONTINUED | OUTPATIENT
Start: 2017-07-03 | End: 2017-07-04 | Stop reason: HOSPADM

## 2017-07-03 RX ORDER — IPRATROPIUM BROMIDE AND ALBUTEROL SULFATE 2.5; .5 MG/3ML; MG/3ML
3 SOLUTION RESPIRATORY (INHALATION) EVERY 4 HOURS PRN
Status: DISCONTINUED | OUTPATIENT
Start: 2017-07-03 | End: 2017-07-04 | Stop reason: HOSPADM

## 2017-07-03 RX ORDER — HEPARIN SODIUM 5000 [USP'U]/ML
5000 INJECTION, SOLUTION INTRAVENOUS; SUBCUTANEOUS EVERY 8 HOURS SCHEDULED
Status: DISCONTINUED | OUTPATIENT
Start: 2017-07-03 | End: 2017-07-04 | Stop reason: HOSPADM

## 2017-07-03 RX ORDER — FUROSEMIDE 10 MG/ML
40 INJECTION INTRAMUSCULAR; INTRAVENOUS ONCE
Status: COMPLETED | OUTPATIENT
Start: 2017-07-03 | End: 2017-07-03

## 2017-07-03 RX ADMIN — FUROSEMIDE 40 MG: 10 INJECTION, SOLUTION INTRAMUSCULAR; INTRAVENOUS at 08:57

## 2017-07-03 RX ADMIN — ATORVASTATIN CALCIUM 40 MG: 40 TABLET, FILM COATED ORAL at 20:09

## 2017-07-03 RX ADMIN — Medication 2 TABLET: at 18:01

## 2017-07-03 RX ADMIN — OXYCODONE HYDROCHLORIDE 10 MG: 5 TABLET ORAL at 06:35

## 2017-07-03 RX ADMIN — OXYCODONE HYDROCHLORIDE 10 MG: 5 TABLET ORAL at 16:05

## 2017-07-03 RX ADMIN — ASPIRIN 325 MG ORAL TABLET 325 MG: 325 PILL ORAL at 08:57

## 2017-07-03 RX ADMIN — METOPROLOL TARTRATE 12.5 MG: 25 TABLET, FILM COATED ORAL at 20:09

## 2017-07-03 RX ADMIN — FAMOTIDINE 20 MG: 20 TABLET ORAL at 08:57

## 2017-07-03 RX ADMIN — OXYCODONE HYDROCHLORIDE 10 MG: 5 TABLET ORAL at 01:44

## 2017-07-03 RX ADMIN — IPRATROPIUM BROMIDE AND ALBUTEROL SULFATE 3 ML: .5; 3 SOLUTION RESPIRATORY (INHALATION) at 13:06

## 2017-07-03 RX ADMIN — GUAIFENESIN 1200 MG: 600 TABLET, EXTENDED RELEASE ORAL at 20:09

## 2017-07-03 RX ADMIN — IPRATROPIUM BROMIDE AND ALBUTEROL SULFATE 3 ML: .5; 3 SOLUTION RESPIRATORY (INHALATION) at 15:37

## 2017-07-03 RX ADMIN — HEPARIN SODIUM 5000 UNITS: 5000 INJECTION, SOLUTION INTRAVENOUS; SUBCUTANEOUS at 21:26

## 2017-07-03 RX ADMIN — HYDROCODONE BITARTRATE AND ACETAMINOPHEN 2 TABLET: 7.5; 325 TABLET ORAL at 04:02

## 2017-07-03 RX ADMIN — IPRATROPIUM BROMIDE AND ALBUTEROL SULFATE 3 ML: .5; 3 SOLUTION RESPIRATORY (INHALATION) at 19:48

## 2017-07-03 RX ADMIN — OXYCODONE HYDROCHLORIDE 10 MG: 5 TABLET ORAL at 21:26

## 2017-07-03 RX ADMIN — Medication 5 ML: at 22:30

## 2017-07-03 RX ADMIN — OXYCODONE HYDROCHLORIDE 10 MG: 5 TABLET ORAL at 09:00

## 2017-07-03 RX ADMIN — FAMOTIDINE 20 MG: 20 TABLET ORAL at 18:01

## 2017-07-03 RX ADMIN — Medication 2 TABLET: at 08:57

## 2017-07-03 NOTE — PROGRESS NOTES
Pt. Referred for Phase II Cardiac Rehab. Staff discussed benefits of exercise, program protocol, and educational material provided. Teach back verified.  Permission granted from patient for staff to fax referral information to outlying program at this time.  Staff to fax referral info to [facility]. Thank you for this referral.

## 2017-07-03 NOTE — PROGRESS NOTES
Adult Nutrition  Assessment/PES    Patient Name:  Aly Gabriel III  YOB: 1950  MRN: [id number]  Admit Date:  6/28/2017    Assessment Date:  7/3/2017        Reason for Assessment       07/03/17 1230    Reason for Assessment    Reason For Assessment/Visit follow up protocol;multidisciplinary rounds    Time Spent (min) 30    Diagnosis Diagnosis   per previous nutrition note.    Principal Problem:    Coronary artery disease (S/P CABGx4)  Active Problems:    Hyperlipidemia    Hypertension    Combined receptive/expressive aphasia due to CVA 2014    Former smoker (0 x 2014)    Chronic pain (Chronic narcs ?)    Paroxysmal atrial fibrillation               Nutrition/Diet History       07/03/17 1234    Nutrition/Diet History    Reported/Observed By Patient;Family    Other fair appetite.               Labs/Tests/Procedures/Meds       07/03/17 1234    Labs/Tests/Procedures/Meds    Labs/Tests Review Reviewed    Procedure Review SLP    Swallow eval status Done    Type of SLP Evaluation --   FEES - rec for regular diet with thin; no straw.     Medication Review Reviewed, pertinent                Nutrition Prescription Ordered       07/03/17 1235    Nutrition Prescription PO    Current PO Diet Regular    Common Modifiers Cardiac            Evaluation of Received Nutrient/Fluid Intake       07/03/17 1237    PO Evaluation    Number of Days PO Intake Evaluated 1 day    Number of Meals 3    % PO Intake 50              Problem/Interventions:        Problem 1       07/03/17 1237    Nutrition Diagnoses Problem 1    Problem 1 No Nutrition Diagnosis at this Time    Inadequate Intake Type Oral    Macronutrient Kcal;Protein    Etiology (related to) Medical Diagnosis   clinical condition.     Signs/Symptoms (evidenced by) PO Intake    Percent (%) intake recorded 50 %    Over number of meals 3                    Intervention Goal       07/03/17 1238    Intervention Goal    General Nutrition support treatment    PO Increase intake             Nutrition Intervention       07/03/17 1238    Nutrition Intervention    RD/Tech Action Interview for preference;Menu provided;Follow Tx progress;Care plan reviewd;Encourage intake              Education/Evaluation       07/03/17 1238    Monitor/Evaluation    Monitor Per protocol;PO intake;Pertinent labs          Electronically signed by:  Kat Li RD  07/03/17 12:39 PM

## 2017-07-03 NOTE — PLAN OF CARE
Problem: Patient Care Overview (Adult)  Goal: Plan of Care Review    07/02/17 1526 07/02/17 1800 07/03/17 0322   Coping/Psychosocial Response Interventions   Plan Of Care Reviewed With --  patient;family --    Patient Care Overview   Progress improving --  --    Outcome Evaluation   Outcome Summary/Follow up Plan --  --  Continuing pulmonary tolieting, no signs of distress, ambulates well, still on levo 0.02       Goal: Adult Individualization and Mutuality    07/01/17 0100   Individualization   Patient Specific Preferences pain medicine needs to be given when available and as scheduled.          Problem: Cardiac Surgery (Adult)  Goal: Signs and Symptoms of Listed Potential Problems Will be Absent or Manageable (Cardiac Surgery)    07/01/17 1802   Cardiac Surgery   Problems Assessed (Cardiac Surgery) pain;situational response   Problems Present (Cardiac Surgery) pain;situational response         Problem: Pressure Ulcer Risk (Delroy Scale) (Adult,Obstetrics,Pediatric)  Goal: Identify Related Risk Factors and Signs and Symptoms    07/01/17 1802   Pressure Ulcer Risk (Delroy Scale)   Related Risk Factors (Pressure Ulcer Risk (Delroy Scale)) cognitive impairment;critical care admission       Goal: Skin Integrity    07/02/17 1526   Pressure Ulcer Risk (Delroy Scale) (Adult,Obstetrics,Pediatric)   Skin Integrity making progress toward outcome

## 2017-07-03 NOTE — PROGRESS NOTES
Continued Stay Note   Rima     Patient Name: Aly Gabriel III  MRN: [id number]  Today's Date: 7/3/2017    Admit Date: 6/28/2017          Discharge Plan       07/03/17 1243    Case Management/Social Work Plan    Plan update    Patient/Family In Agreement With Plan yes    Additional Comments No change in discharge plans at this time. Home with wife to [place]. Currently he nor sister have any concerns for discharge planning. CM will continue to follow.              Discharge Codes     None        Expected Discharge Date and Time     Expected Discharge Date Expected Discharge Time    Jul 4, 2017             Ashely Steele RN

## 2017-07-03 NOTE — PROGRESS NOTES
"Intensivist Note     7/3/2017  Hospital Day: 5      Mr. Aly Gabriel III, 67 y.o. male is followed for:  Principal Problem:    Coronary artery disease (S/P CABGx4 on 6/28/17)  Active Problems:    Paroxysmal atrial fibrillation    Spontaneous Pneumothorax on left. Getting smaller on 7/3/17 chest x-ray    Hyperlipidemia    Hypertension    Combined receptive/expressive aphasia due to CVA 2014    Former smoker (0 x 2014)    Chronic pain (Chronic narcs ?)       SUBJECTIVE     Subjective     67-year-old white male former smoker status post CABG 6/28/17. He did have some intraoperative atrial fibrillation but resolves spontaneously and is now in sinus rate. Does have a history of CVA in 2014 with residual receptive and expressive aphasia. Doing well today and has no complaints. Wants to go home. No chest pain, cough, hemoptysis, pleuritic pain. No documented fever.    Continues to have an asymptomatic left pneumothorax since MTs removed. Denies any difficulty breathing but sounds junky on exam    The patient's relevant past medical, surgical and social history were reviewed and updated in Epic as appropriate.    OBJECTIVE     /63  Pulse 85  Temp 98 °F (36.7 °C) (Oral)   Resp 20  Ht 67\" (170.2 cm)  Wt 162 lb 6 oz (73.7 kg)  SpO2 92%  BMI 25.43 kg/m2  Flow (L/min): 2    Flowsheet Rows         First Filed Value    Admission Height  67\" (170.2 cm) Documented at 06/28/2017 2000    Admission Weight  162 lb 6 oz (73.7 kg) Documented at 06/28/2017 2000        Intake & Output (last day)       07/02 0701 - 07/03 0700 07/03 0701 - 07/04 0700    P.O.  200    I.V. (mL/kg)      IV Piggyback 500     Total Intake(mL/kg) 500 (6.8) 200 (2.7)    Urine (mL/kg/hr) 200 (0.1) 800 (1.1)    Total Output 200 800    Net +300 -600                Objective    Exam:  General Exam:  Well-developed white male in no acute distress  HEENT: Pupils equal and reactive. Nose and throat clear.  Neck:                          Supple, no JVD, " thyromegaly, or adenopathy. Right IJ catheter  Lungs: Coarse bilateral rhonchi  Cardiovascular: Regular rate and rhythm without murmurs or gallops.  Abdomen: Soft nontender without organomegaly or masses.   and rectal: Deferred  Extremities: No cyanosis or clubbing but trace edema.  Neurologic:                 Mild right-sided weakness. Alert and oriented    Chest X-Ray 7/3/17: Mild cardiomegaly. All left-sided pneumothorax without significant change (if anything is slightly smaller). Left lower lobe effusion/atelectasis. Right IJ Cordis in place    INFUSIONS         Results from last 7 days  Lab Units 07/03/17  0335 07/02/17  0329 07/01/17  0337   WBC 10*3/mm3 13.46* 14.22* 20.42*   HEMOGLOBIN g/dL 8.6* 8.7* 9.3*   HEMATOCRIT % 26.1* 26.6* 28.2*   PLATELETS 10*3/mm3 247 187 163       Results from last 7 days  Lab Units 07/03/17  0335 07/02/17  0329   SODIUM mmol/L 137 136   POTASSIUM mmol/L 3.7 4.1   CHLORIDE mmol/L 101 100   CO2 mmol/L 28.0 32.0*   BUN mg/dL 15 13   CREATININE mg/dL 0.70 0.90   GLUCOSE mg/dL 121* 126*   CALCIUM mg/dL 9.3 9.5       Results from last 7 days  Lab Units 07/01/17  0337 06/29/17  0353 06/28/17  2049   MAGNESIUM mg/dL 2.1 3.1* 3.7*   PHOSPHORUS mg/dL 2.8 4.3 3.5       No results found for: SEDRATE  No results found for: BNP  No results found for: CKTOTAL, CKMB, CKMBINDEX, TROPONINI, TROPONINT  No results found for: TSH  No results found for: LACTATE  No results found for: CORTISOL      Results from last 7 days  Lab Units 06/29/17  0312 06/29/17  0309 06/28/17  2325 06/28/17  2220 06/28/17  1733 06/28/17  1626   PH, ARTERIAL pH units  --  7.376 7.356 7.304 7.327* 7.26*   PCO2, ARTERIAL mm Hg  --  41.0 43.6 50.1 43.6  --    PO2 ART mm Hg  --  68.4* 87.6 111.0 254.0*  --    HCO3 ART mmol/L  --  24.1 24.3 24.9 22.8  --    FIO2 % 36 36 30 40 100  --          I reviewed the patient's results, images and medication.    Assessment/Plan   ASSESSMENT      Principal Problem:    Coronary artery  disease (S/P CABGx4 on 6/28/17)  Active Problems:    Paroxysmal atrial fibrillation    Spontaneous Pneumothorax on left. Getting smaller on 7/3/17 chest x-ray    Hyperlipidemia    Hypertension    Combined receptive/expressive aphasia due to CVA 2014    Former smoker (0 x 2014)    Chronic pain (Chronic narcs ?)      DISCUSSION: Pneumothorax resolving and is being transferred to the floor.    PLAN     1. Agree with transfer to floor  2. Will begin on low-dose heparin for DVT prophylaxis while in the hospital  3. Neb treatments have been ordered  4. Mucolytic's    Plan of care and goals reviewed with mulitdisciplinary team at daily rounds.    I discussed the patient's findings and my recommendations with patient, family and nursing staff    Time spent Critical care 20 min (It does not include procedure time).    Herbert Mata MD  Intensive Care Medicine  07/03/17 5:08 PM

## 2017-07-03 NOTE — PROGRESS NOTES
"[facility]  IP Progress Note   LOS: 5 days   Patient Care Team:  Félix Blair MD as PCP - General (Internal Medicine)    Chief Complaint:  Coronary Artery Disease  Atrial Fibrilation    Subjective Denies Chest Pain, Denies Dyspnea, Eating OK. Desires discharge.      Objective       Problem   Hypertension    Remains marginal after CABG     Coronary Artery Disease Involving Native Coronary Artery of Native Heart Without Angina Pectoris    1. 6-28-17:  Coronary artery bypass grafting ×4 with left internal mammary artery to the left anterior descending. Saphenous vein graft to a large obtuse marginal branch. In the saphenous vein graft sequence between the posterior descending and the posterior lateral branch.      Hyperlipidemia    On atorvastatin     Paroxysmal Atrial Fibrillation    Intra-operative, no reoccurance       Patient Active Problem List   Diagnosis   • Shortness of breath   • Palpitations   • Dyspnea   • Coronary artery disease involving native coronary artery of native heart without angina pectoris   • Coronary artery disease (S/P CABGx4)   • Hyperlipidemia   • Hypertension   • Combined receptive/expressive aphasia due to CVA 2014   • Former smoker (0 x 2014)   • Chronic pain (Chronic narcs ?)   • Paroxysmal atrial fibrillation         Tele: Sinus Rythym    Vitals:  Blood pressure 96/61, pulse 99, temperature 98.1 °F (36.7 °C), temperature source Axillary, resp. rate 18, height 67\" (170.2 cm), weight 162 lb 6 oz (73.7 kg), SpO2 (!) 87 %.     Intake/Output Summary (Last 24 hours) at 07/03/17 0945  Last data filed at 07/03/17 0700   Gross per 24 hour   Intake              500 ml   Output              200 ml   Net              300 ml       Physical Exam:      General: alert, no acute distress, acyanotic, well developed, well nourished   Chest: Clear Auscultation and No Wheezes   CV: Heart sounds are normal.  Regular rate and rhythm without murmur, gallop or " rub. Sternotomy benign.   Extremities: trace edema    Results Review:     I reviewed the patient's new clinical results.      Results from last 7 days  Lab Units 07/03/17  0335   WBC 10*3/mm3 13.46*   HEMOGLOBIN g/dL 8.6*   HEMATOCRIT % 26.1*   PLATELETS 10*3/mm3 247       Results from last 7 days  Lab Units 07/03/17  0335   SODIUM mmol/L 137   POTASSIUM mmol/L 3.7   CHLORIDE mmol/L 101   CO2 mmol/L 28.0   BUN mg/dL 15   CREATININE mg/dL 0.70   CALCIUM mg/dL 9.3   BILIRUBIN mg/dL 0.5   ALK PHOS U/L 62   ALT (SGPT) U/L 21   AST (SGOT) U/L 22   GLUCOSE mg/dL 121*       Scheduled Meds:    aspirin 325 mg Oral Daily   atorvastatin 40 mg Oral Nightly   famotidine 20 mg Oral BID   insulin lispro 0-9 Units Subcutaneous 4x Daily AC & at Bedtime   ipratropium-albuterol 3 mL Nebulization 4x Daily - RT   metoprolol tartrate 12.5 mg Oral Q12H   pharmacy consult - MTM  Does not apply Daily   sennosides-docusate sodium 2 tablet Oral BID         Assessment:  1. CAD POD#5 after CABG×4, normal EF   2. HTN: Controlled, low dose BB.  3. Dyslipidemia: Statin  4. History of CVA with residual aphasia/ Remote CEA  5. Postsurgical anemia.  6. COPD, Remote tobacco: Pulm toliet   7. Chronic Pain   8. Reported Intra op Afib, but no Afib post op CABG for d/c Amiodarone.        Plan:  · To telemetry when room available  · Pulmonary toilet/ambulate  · Flutter valve  · Home soon            JE Guan    I, George Gross MD, personally performed the services described as documented by the above named individual. I have made any necessary edits and it is both accurate and complete 7/3/2017  4:44 PM  07/03/17  9:45 AM

## 2017-07-03 NOTE — PROGRESS NOTES
CTS Progress Note       LOS: 5 days   Patient Care Team:  Félix Blair MD as PCP - General (Internal Medicine)        Vital Signs  Temp:  [97.7 °F (36.5 °C)-98.8 °F (37.1 °C)] 98.1 °F (36.7 °C)  Heart Rate:  [76-93] 90  Resp:  [14-18] 18  BP: ()/(40-93) 96/63    Physical Exam: Up in chair.  He wants to go home.       Results     Results from last 7 days  Lab Units 07/03/17  0335   WBC 10*3/mm3 13.46*   HEMOGLOBIN g/dL 8.6*   HEMATOCRIT % 26.1*   PLATELETS 10*3/mm3 247       Results from last 7 days  Lab Units 07/03/17  0335   SODIUM mmol/L 137   POTASSIUM mmol/L 3.7   CHLORIDE mmol/L 101   CO2 mmol/L 28.0   BUN mg/dL 15   CREATININE mg/dL 0.70   GLUCOSE mg/dL 121*   CALCIUM mg/dL 9.3           Imaging Results (last 24 hours)     Procedure Component Value Units Date/Time    XR Chest 1 View [[id number]] Collected:  07/01/17 1804     Updated:  07/02/17 1555    Narrative:          EXAMINATION: XR CHEST 1 VW - 07/01/2017     INDICATION: Z74.09-Other reduced mobility; I25.10-Atherosclerotic heart  disease of native coronary artery without angina pectoris;  R13.10-Dysphagia, unspecified .     COMPARISON: 06/30/2017.     FINDINGS: Portable chest reveals removal of the [place] catheter.  Bilateral chest tubes have been removed. There is a small left pleural  effusion. Mild increased markings at the left lung base. Slight  prominence of the pulmonary vascularity. Moderate size pneumothorax  identified on the left.           Impression:       Moderate-sized left pneumothorax with removal of a left  chest tube. There is a developing left pleural effusion. Increased  markings identified within the left lung base. Prominence again seen of  the pulmonary vascularity bilaterally.     DICTATED:     07/01/2017  EDITED:         07/01//2017     This report was finalized on 7/2/2017 3:51 PM by Dr. Peggy Key MD.       XR Chest 1 View [[id number]] Collected:  07/02/17 1337     Updated:  07/02/17 1559     Narrative:          EXAMINATION: XR CHEST 1 VW - 07/02/2017     INDICATION:  Z74.09-Other reduced mobility; I25.10-Atherosclerotic heart  disease of native coronary artery without angina pectoris;  R13.10-Dysphagia, unspecified.     COMPARISON: 07/01/2017.     FINDINGS: Minimal improvement seen in the pneumothorax on the left. Mild  increased markings seen at the left lung base. The right lung is also  unchanged. The heart is borderline enlarged.           Impression:       Slight improvement seen in the pneumothorax on the left.  There are increased markings remaining at the left lung base. Mild  increased markings again seen in the right perihilar region and right  lung base.     DICTATED:     07/02/2017  EDITED:         07/02/2017     This report was finalized on 7/2/2017 3:52 PM by Dr. Peggy Key MD.       XR Chest 1 View [[id number]] Updated:  07/03/17 0412          Assessment    Principal Problem:    Coronary artery disease (S/P CABGx4)  Active Problems:    Hyperlipidemia    Hypertension    Combined receptive/expressive aphasia due to CVA 2014    Former smoker (0 x 2014)    Chronic pain (Chronic narcs ?)    Paroxysmal atrial fibrillation    Continues with asymptomatic pneumothorax.  We'll transfer to telemetry today.  Chest x-ray in the a.m.    Plan   transfer to telemetry chest x-ray in the a.m.    Please note that portions of this note were completed with a voice recognition program. Efforts were made to edit the dictations, but occasionally words are mistranscribed.    Ronal Anthony MD  07/03/17  6:56 AM

## 2017-07-03 NOTE — THERAPY TREATMENT NOTE
Acute Care - Physical Therapy Treatment Note  [facility]     Patient Name: Aly Gabriel III  : 1950  MRN: [id number]  Today's Date: 7/3/2017  Onset of Illness/Injury or Date of Surgery Date: 17  Date of Referral to PT: 17  Referring Physician: MD Anthony    Admit Date: 2017    Visit Dx:    ICD-10-CM ICD-9-CM   1. Impaired functional mobility, balance, gait, and endurance Z74.09 V49.89   2. CAD in native artery I25.10 414.01   3. Dysphagia, unspecified type R13.10 787.20     Patient Active Problem List   Diagnosis   • Shortness of breath   • Palpitations   • Dyspnea   • Coronary artery disease involving native coronary artery of native heart without angina pectoris   • Coronary artery disease (S/P CABGx4)   • Hyperlipidemia   • Hypertension   • Combined receptive/expressive aphasia due to CVA    • Former smoker (0 x 2014)   • Chronic pain (Chronic narcs ?)   • Paroxysmal atrial fibrillation               Adult Rehabilitation Note       17 0846          Rehab Assessment/Intervention    Discipline (P)  physical therapist  -KR      Document Type (P)  therapy note (daily note)  -KR      Subjective Information (P)  agree to therapy;no complaints  -KR      Patient Effort, Rehab Treatment (P)  good  -KR      Symptoms Noted During/After Treatment (P)  none  -KR      Recorded by [KR] Aleyda Howard, PT Student      Vital Signs    Pre Systolic BP Rehab (P)  96  -KR      Pre Treatment Diastolic BP (P)  61  -KR      Post Systolic BP Rehab (P)  121  -KR      Post Treatment Diastolic BP (P)  69  -KR      Pretreatment Heart Rate (beats/min) (P)  88  -KR      Posttreatment Heart Rate (beats/min) (P)  88  -KR      Pre SpO2 (%) (P)  97  -KR      O2 Delivery Pre Treatment (P)  supplemental O2  -KR      Post SpO2 (%) (P)  100  -KR      O2 Delivery Post Treatment (P)  supplemental O2  -KR      Pre Patient Position (P)  Sitting  -KR      Intra Patient Position (P)  Standing  -KR      Post Patient Position  (P)  Sitting  -KR      Recorded by [RON] Aleyda Howard, PT Student      Pain Assessment    Pain Assessment (P)  0-10  -KR      Pain Score (P)  0  -KR      Post Pain Score (P)  0  -KR      Recorded by [RON] Aleyda Howard, PT Student      Cognitive Assessment/Intervention    Current Cognitive/Communication Assessment (P)  impaired  -KR      Orientation Status (P)  oriented to;person;unable/difficult to assess   dysphagia from prior CVA  -KR      Follows Commands/Answers Questions (P)  able to follow single-step instructions;75% of the time;needs cueing;needs increased time;needs repetition  -KR      Personal Safety (P)  moderate impairment;decreased awareness, need for assist;decreased awareness, need for safety;decreased insight to deficits  -KR      Personal Safety Interventions (P)  fall prevention program maintained;gait belt;nonskid shoes/slippers when out of bed  -KR      Recorded by [RON] Aleyda Howard, PT Student      Bed Mobility, Assessment/Treatment    Bed Mobility, Comment (P)  UIC  -KR      Recorded by [RON] Aleyda Howard, PT Student      Transfer Assessment/Treatment    Transfers, Sit-Stand [place] (P)  minimum assist (75% patient effort);2 person assist required;verbal cues required  -KR      Transfers, Stand-Sit [place] (P)  minimum assist (75% patient effort);2 person assist required;verbal cues required  -KR      Transfer, Safety Issues (P)  balance decreased during turns;step length decreased;sequencing ability decreased  -KR      Transfer, Impairments (P)  impaired balance;coordination impaired  -KR      Transfer, Comment (P)  Pt declined use of RW for balance, but held onto transport monitor. VC's for upright posture.   -KR      Recorded by [RON] Aleyda Howard, PT Student      Gait Assessment/Treatment    Gait, [place] Level (P)  contact guard assist;2 person assist required;verbal cues required  -KR      Gait, Distance (Feet) (P)  300  -KR      Gait, Gait Pattern Analysis (P)   swing-to gait  -KR      Gait, Gait Deviations (P)  step length decreased;forward flexed posture  -KR      Gait, Safety Issues (P)  balance decreased during turns;sequencing ability decreased;step length decreased;supplemental O2  -KR      Gait, Impairments (P)  impaired balance  -KR      Gait, Comment (P)  Pt requires VC's for direction and posture. Slow to follow commands.  -KR      Recorded by [RON] Aleyda Howard, PT Student      Therapy Exercises    Bilateral Lower Extremities (P)  AROM:;sitting;ankle pumps/circles;hip flexion;LAQ;10 reps   difficulty following instructions  -KR      Recorded by [RON] Aleyda Howard, PT Student      Positioning and Restraints    Pre-Treatment Position (P)  sitting in chair/recliner  -KR      Post Treatment Position (P)  chair  -KR      In Chair (P)  sitting;call light within reach;encouraged to call for assist;with family/caregiver;with nsg  -KR      Recorded by [RON] Aleyda Howard, PT Student        User Key  (r) = Recorded By, (t) = Taken By, (c) = Cosigned By    Initials Name Effective Dates    RON Howard, PT Student 05/30/17 -                 IP PT Goals       07/03/17 1051 06/29/17 1517       Bed Mobility PT LTG    Bed Mobility PT LTG, Date Established  06/29/17  -JESSE     Bed Mobility PT LTG, Time to Achieve  2 wks  -JESSE     Bed Mobility PT LTG, Activity Type  supine to sit/sit to supine  -JESSE     Bed Mobility PT LTG, [place] Level  independent  -JESSE     Bed Mobility PT LTG, Outcome (P)  goal ongoing  -KR goal ongoing  -JESSE     Transfer Training PT LTG    Transfer Training PT LTG, Date Established  06/29/17  -JESSE     Transfer Training PT LTG, Time to Achieve  2 wks  -JESSE     Transfer Training PT LTG, Activity Type  sit to stand/stand to sit  -JESSE     Transfer Training PT LTG, [place] Level  independent  -JESSE     Transfer Training PT LTG, Outcome (P)  goal ongoing  -KR goal ongoing  -JESSE     Gait Training PT LTG    Gait Training Goal PT LTG, Date Established  06/29/17   -JESSE     Gait Training Goal PT LTG, Time to Achieve  2 wks  -JESSE     Gait Training Goal PT LTG, Person Level  independent  -JESSE     Gait Training Goal PT LTG, Distance to Achieve  300  -JESSE     Gait Training Goal PT LTG, Outcome (P)  goal partially met   met for distance with CGA  -KR goal ongoing  -JESSE       User Key  (r) = Recorded By, (t) = Taken By, (c) = Cosigned By    Initials Name Provider Type    JESSE Stephania Alba, PT Physical Therapist    RON Howard, PT Student PT Student          Physical Therapy Education     Title: PT OT SLP Therapies (Active)     Topic: Physical Therapy (Active)     Point: Mobility training (Active)    Learning Progress Summary    Learner Readiness Method Response Comment Documented by Status   Patient Acceptance E NR  RON 07/03/17 1050 Active    Acceptance E NR  JESSE 06/30/17 1038 Active    Acceptance E NR  JESSE 06/29/17 1517 Active   Significant Other Acceptance E NR  JESSE 06/29/17 1517 Active               Point: Home exercise program (Active)    Learning Progress Summary    Learner Readiness Method Response Comment Documented by Status   Patient Acceptance E NR  JESSE 06/30/17 1038 Active    Acceptance E NR  JESSE 06/29/17 1517 Active   Significant Other Acceptance E NR  JESSE 06/29/17 1517 Active               Point: Body mechanics (Active)    Learning Progress Summary    Learner Readiness Method Response Comment Documented by Status   Patient Acceptance E NR  KR 07/03/17 1050 Active    Acceptance E NR  JESSE 06/30/17 1038 Active    Acceptance E NR  JESSE 06/29/17 1517 Active   Significant Other Acceptance E NR  JESSE 06/29/17 1517 Active               Point: Precautions (Active)    Learning Progress Summary    Learner Readiness Method Response Comment Documented by Status   Patient Acceptance E NR  KR 07/03/17 1050 Active    Acceptance E NR  JESSE 06/30/17 1038 Active    Acceptance E NR  JESSE 06/29/17 1517 Active   Significant Other Acceptance E NR  JESSE 06/29/17 1517 Active                      User  Key     Initials Effective Dates Name Provider Type Discipline    JESSE 06/19/15 -  Stephania Alba, PT Physical Therapist PT    KR 05/30/17 -  Aleyda Howard, PT Student PT Student PT                    PT Recommendation and Plan  Anticipated Discharge Disposition: home with assist, home with home health  PT Frequency: daily, per priority policy  Plan of Care Review  Plan Of Care Reviewed With: (P) patient  Progress: (P) improving  Outcome Summary/Follow up Plan: (P) Pt increased ambulation distance to 300ft with CGAx2. Pt declined use of RW, but demonstrated increased balance when holding onto monitor. Pt limited by fatigue and signs of SOA. Continue to progress as appropriate.           Outcome Measures       07/03/17 0846          How much help from another person do you currently need...    Turning from your back to your side while in flat bed without using bedrails? (P)  3  -KR      Moving from lying on back to sitting on the side of a flat bed without bedrails? (P)  3  -KR      Moving to and from a bed to a chair (including a wheelchair)? (P)  3  -KR      Standing up from a chair using your arms (e.g., wheelchair, bedside chair)? (P)  3  -KR      Climbing 3-5 steps with a railing? (P)  2  -KR      To walk in hospital room? (P)  3  -KR      AM-PAC 6 Clicks Score (P)  17  -KR      Functional Assessment    Outcome Measure Options (P)  AM-PAC 6 Clicks Basic Mobility (PT)  -KR        User Key  (r) = Recorded By, (t) = Taken By, (c) = Cosigned By    Initials Name Provider Type    KR Aleyda Howard, PT Student PT Student           Time Calculation:         PT Charges       07/03/17 1055          Time Calculation    Start Time (P)  0846  -KR      PT Received On (P)  07/03/17  -KR      PT Goal Re-Cert Due Date (P)  07/09/17  -KR      Time Calculation- PT    Total Timed Code Minutes- PT (P)  23 minute(s)  -KR        User Key  (r) = Recorded By, (t) = Taken By, (c) = Cosigned By    Initials Name Provider Type    KR  Aleyda Howard, PT Student PT Student          Therapy Charges for Today     Code Description Service Date Service Provider Modifiers Qty    03150084101 HC PT THER PROC EA 15 MIN 7/3/2017 Aleyda Howard, PT Student GP 2    65434953110 HC PT THER SUPP EA 15 MIN 7/3/2017 Aleyda Howard, PT Student GP 2          PT G-Codes  Outcome Measure Options: (P) AM-PAC 6 Clicks Basic Mobility (PT)    Pam Howard PT Student  7/3/2017

## 2017-07-03 NOTE — PLAN OF CARE
Problem: Patient Care Overview (Adult)  Goal: Adult Individualization and Mutuality  Outcome: Ongoing (interventions implemented as appropriate)    Problem: Cardiac Surgery (Adult)  Goal: Signs and Symptoms of Listed Potential Problems Will be Absent or Manageable (Cardiac Surgery)  Outcome: Outcome(s) achieved Date Met:  07/03/17    Problem: Pressure Ulcer Risk (Delroy Scale) (Adult,Obstetrics,Pediatric)  Goal: Skin Integrity  Outcome: Ongoing (interventions implemented as appropriate)    07/03/17 1923   Pressure Ulcer Risk (Delroy Scale) (Adult,Obstetrics,Pediatric)   Skin Integrity achieves outcome

## 2017-07-03 NOTE — PLAN OF CARE
Problem: Patient Care Overview (Adult)  Goal: Plan of Care Review  Outcome: Ongoing (interventions implemented as appropriate)    07/03/17 1051   Coping/Psychosocial Response Interventions   Plan Of Care Reviewed With patient   Patient Care Overview   Progress improving   Outcome Evaluation   Outcome Summary/Follow up Plan Pt increased ambulation distance to 300ft with CGAx2. Pt declined use of RW, but demonstrated increased balance when holding onto monitor. Pt limited by fatigue and signs of SOA. Continue to progress as appropriate.          Problem: Inpatient Physical Therapy  Goal: Bed Mobility Goal LTG- PT  Outcome: Ongoing (interventions implemented as appropriate)    06/29/17 1517 07/03/17 1051   Bed Mobility PT LTG   Bed Mobility PT LTG, Date Established 06/29/17 --    Bed Mobility PT LTG, Time to Achieve 2 wks --    Bed Mobility PT LTG, Activity Type supine to sit/sit to supine --    Bed Mobility PT LTG, [place] Level independent --    Bed Mobility PT LTG, Outcome --  goal ongoing       Goal: Transfer Training Goal 1 LTG- PT  Outcome: Ongoing (interventions implemented as appropriate)    06/29/17 1517 07/03/17 1051   Transfer Training PT LTG   Transfer Training PT LTG, Date Established 06/29/17 --    Transfer Training PT LTG, Time to Achieve 2 wks --    Transfer Training PT LTG, Activity Type sit to stand/stand to sit --    Transfer Training PT LTG, [place] Level independent --    Transfer Training PT LTG, Outcome --  goal ongoing       Goal: Gait Training Goal LTG- PT  Outcome: Ongoing (interventions implemented as appropriate)    06/29/17 1517 07/03/17 1051   Gait Training PT LTG   Gait Training Goal PT LTG, Date Established 06/29/17 --    Gait Training Goal PT LTG, Time to Achieve 2 wks --    Gait Training Goal PT LTG, [place] Level independent --    Gait Training Goal PT LTG, Distance to Achieve 300 --    Gait Training Goal PT LTG, Outcome --  goal partially met  (met for distance with  CGA)

## 2017-07-04 ENCOUNTER — APPOINTMENT (OUTPATIENT)
Dept: GENERAL RADIOLOGY | Facility: HOSPITAL | Age: 67
End: 2017-07-04

## 2017-07-04 VITALS
BODY MASS INDEX: 25.48 KG/M2 | HEIGHT: 67 IN | RESPIRATION RATE: 16 BRPM | DIASTOLIC BLOOD PRESSURE: 71 MMHG | WEIGHT: 162.38 LBS | SYSTOLIC BLOOD PRESSURE: 143 MMHG | HEART RATE: 96 BPM | TEMPERATURE: 98.5 F | OXYGEN SATURATION: 91 %

## 2017-07-04 LAB
ALBUMIN SERPL-MCNC: 3.1 G/DL (ref 3.2–4.8)
ANION GAP SERPL CALCULATED.3IONS-SCNC: 6 MMOL/L (ref 3–11)
BASOPHILS # BLD AUTO: 0.03 10*3/MM3 (ref 0–0.2)
BASOPHILS NFR BLD AUTO: 0.3 % (ref 0–1)
BUN BLD-MCNC: 14 MG/DL (ref 9–23)
BUN/CREAT SERPL: 20 (ref 7–25)
CALCIUM SPEC-SCNC: 9.5 MG/DL (ref 8.7–10.4)
CHLORIDE SERPL-SCNC: 99 MMOL/L (ref 99–109)
CO2 SERPL-SCNC: 28 MMOL/L (ref 20–31)
CREAT BLD-MCNC: 0.7 MG/DL (ref 0.6–1.3)
DEPRECATED RDW RBC AUTO: 56.9 FL (ref 37–54)
EOSINOPHIL # BLD AUTO: 0.57 10*3/MM3 (ref 0–0.3)
EOSINOPHIL NFR BLD AUTO: 4.8 % (ref 0–3)
ERYTHROCYTE [DISTWIDTH] IN BLOOD BY AUTOMATED COUNT: 15.4 % (ref 11.3–14.5)
GFR SERPL CREATININE-BSD FRML MDRD: 112 ML/MIN/1.73
GLUCOSE BLD-MCNC: 92 MG/DL (ref 70–100)
GLUCOSE BLDC GLUCOMTR-MCNC: 112 MG/DL (ref 70–130)
GLUCOSE BLDC GLUCOMTR-MCNC: 152 MG/DL (ref 70–130)
HCT VFR BLD AUTO: 22.7 % (ref 38.9–50.9)
HGB BLD-MCNC: 7.5 G/DL (ref 13.1–17.5)
IMM GRANULOCYTES # BLD: 0.04 10*3/MM3 (ref 0–0.03)
IMM GRANULOCYTES NFR BLD: 0.3 % (ref 0–0.6)
LYMPHOCYTES # BLD AUTO: 2.22 10*3/MM3 (ref 0.6–4.8)
LYMPHOCYTES NFR BLD AUTO: 18.5 % (ref 24–44)
MCH RBC QN AUTO: 33.2 PG (ref 27–31)
MCHC RBC AUTO-ENTMCNC: 33 G/DL (ref 32–36)
MCV RBC AUTO: 100.4 FL (ref 80–99)
MONOCYTES # BLD AUTO: 1.99 10*3/MM3 (ref 0–1)
MONOCYTES NFR BLD AUTO: 16.6 % (ref 0–12)
NEUTROPHILS # BLD AUTO: 7.13 10*3/MM3 (ref 1.5–8.3)
NEUTROPHILS NFR BLD AUTO: 59.5 % (ref 41–71)
PHOSPHATE SERPL-MCNC: 3.8 MG/DL (ref 2.4–5.1)
PLATELET # BLD AUTO: 290 10*3/MM3 (ref 150–450)
PMV BLD AUTO: 9.5 FL (ref 6–12)
POTASSIUM BLD-SCNC: 3.4 MMOL/L (ref 3.5–5.5)
RBC # BLD AUTO: 2.26 10*6/MM3 (ref 4.2–5.76)
SODIUM BLD-SCNC: 133 MMOL/L (ref 132–146)
WBC NRBC COR # BLD: 11.98 10*3/MM3 (ref 3.5–10.8)

## 2017-07-04 PROCEDURE — 82962 GLUCOSE BLOOD TEST: CPT

## 2017-07-04 PROCEDURE — 25010000002 HEPARIN (PORCINE) PER 1000 UNITS: Performed by: INTERNAL MEDICINE

## 2017-07-04 PROCEDURE — 92526 ORAL FUNCTION THERAPY: CPT

## 2017-07-04 PROCEDURE — 94799 UNLISTED PULMONARY SVC/PX: CPT

## 2017-07-04 PROCEDURE — 94760 N-INVAS EAR/PLS OXIMETRY 1: CPT

## 2017-07-04 PROCEDURE — 99024 POSTOP FOLLOW-UP VISIT: CPT | Performed by: THORACIC SURGERY (CARDIOTHORACIC VASCULAR SURGERY)

## 2017-07-04 PROCEDURE — 71010 HC CHEST PA OR AP: CPT

## 2017-07-04 PROCEDURE — 85025 COMPLETE CBC W/AUTO DIFF WBC: CPT | Performed by: INTERNAL MEDICINE

## 2017-07-04 PROCEDURE — 80069 RENAL FUNCTION PANEL: CPT | Performed by: INTERNAL MEDICINE

## 2017-07-04 PROCEDURE — 99238 HOSP IP/OBS DSCHRG MGMT 30/<: CPT | Performed by: INTERNAL MEDICINE

## 2017-07-04 RX ORDER — ATORVASTATIN CALCIUM 40 MG/1
40 TABLET, FILM COATED ORAL NIGHTLY
Qty: 30 TABLET | Refills: 11 | Status: SHIPPED | OUTPATIENT
Start: 2017-07-04 | End: 2018-07-04

## 2017-07-04 RX ADMIN — OXYCODONE HYDROCHLORIDE 10 MG: 5 TABLET ORAL at 05:06

## 2017-07-04 RX ADMIN — IPRATROPIUM BROMIDE AND ALBUTEROL SULFATE 3 ML: .5; 3 SOLUTION RESPIRATORY (INHALATION) at 05:27

## 2017-07-04 RX ADMIN — HEPARIN SODIUM 5000 UNITS: 5000 INJECTION, SOLUTION INTRAVENOUS; SUBCUTANEOUS at 05:06

## 2017-07-04 RX ADMIN — HYDROCODONE BITARTRATE AND ACETAMINOPHEN 2 TABLET: 7.5; 325 TABLET ORAL at 00:24

## 2017-07-04 RX ADMIN — ASPIRIN 325 MG ORAL TABLET 325 MG: 325 PILL ORAL at 09:18

## 2017-07-04 RX ADMIN — Medication 10 ML: at 13:11

## 2017-07-04 RX ADMIN — IPRATROPIUM BROMIDE AND ALBUTEROL SULFATE 3 ML: .5; 3 SOLUTION RESPIRATORY (INHALATION) at 12:56

## 2017-07-04 RX ADMIN — HYDROCODONE BITARTRATE AND ACETAMINOPHEN 2 TABLET: 7.5; 325 TABLET ORAL at 13:10

## 2017-07-04 RX ADMIN — IPRATROPIUM BROMIDE AND ALBUTEROL SULFATE 3 ML: .5; 3 SOLUTION RESPIRATORY (INHALATION) at 17:16

## 2017-07-04 RX ADMIN — Medication 2 TABLET: at 09:18

## 2017-07-04 RX ADMIN — Medication 10 ML: at 05:06

## 2017-07-04 RX ADMIN — FAMOTIDINE 20 MG: 20 TABLET ORAL at 09:18

## 2017-07-04 RX ADMIN — HYDROCODONE BITARTRATE AND ACETAMINOPHEN 2 TABLET: 7.5; 325 TABLET ORAL at 17:25

## 2017-07-04 RX ADMIN — GUAIFENESIN 1200 MG: 600 TABLET, EXTENDED RELEASE ORAL at 09:18

## 2017-07-04 RX ADMIN — HYDROCODONE BITARTRATE AND ACETAMINOPHEN 2 TABLET: 7.5; 325 TABLET ORAL at 08:52

## 2017-07-04 NOTE — PLAN OF CARE
Problem: Patient Care Overview (Adult)  Goal: Plan of Care Review  Outcome: Ongoing (interventions implemented as appropriate)    07/04/17 1247   Coping/Psychosocial Response Interventions   Plan Of Care Reviewed With patient;sibling   Patient Care Overview   Progress progress toward functional goals as expected   Outcome Evaluation   Outcome Summary/Follow up Plan Pt/family/RN reported pt having no difficulty tolerating regular diet and thin liquids. Trialed thin via cup, solid cracker. Pt taking large bites of food, but small sips via cup w/o cues. No overt clinical s/sxs aspiration observed. Provided edu re: swallow precautions, avoiding straw use, risks of aspiration. Pt/family stated understanding. Pt preparing to d/c hospital for home as soon as wife arrives. Provided contact info should any ?s/concerns arise. Pt stated understanding.         Problem: Inpatient SLP  Goal: Dysphagia- Patient will safely consume diet as per recommendation with no signs/symptoms of aspiration  Outcome: Outcome(s) achieved Date Met:  07/04/17 06/30/17 1131 07/04/17 1247   Safely Consume Diet   Safely Consume Diet- SLP, Date Established 06/30/17 --    Safely Consume Diet- SLP, Time to Achieve by discharge --    Safely Consume Diet- SLP, Date Goal Reviewed --  07/04/17   Safely Consume Diet- SLP, Outcome --  goal met

## 2017-07-04 NOTE — THERAPY DISCHARGE NOTE
Acute Care - Speech Language Pathology   Swallow Treatment Note/Discharge    Rima     Patient Name: Aly Gabriel III  : 1950  MRN: [id number]  Today's Date: 2017  Onset of Illness/Injury or Date of Surgery Date: 17            Admit Date: 2017    Visit Dx:      ICD-10-CM ICD-9-CM   1. Impaired functional mobility, balance, gait, and endurance Z74.09 V49.89   2. CAD in native artery I25.10 414.01   3. Dysphagia, unspecified type R13.10 787.20     Patient Active Problem List   Diagnosis   • Shortness of breath   • Palpitations   • Dyspnea   • Coronary artery disease involving native coronary artery of native heart without angina pectoris   • Coronary artery disease (S/P CABGx4 on 17)   • Hyperlipidemia   • Hypertension   • Combined receptive/expressive aphasia due to CVA    • Former smoker (0 x 2014)   • Chronic pain (Chronic narcs ?)   • Paroxysmal atrial fibrillation   • Spontaneous Pneumothorax on left. Getting smaller on 7/3/17 chest x-ray             Adult Rehabilitation Note       17 1230 17 0846       Rehab Assessment/Intervention    Discipline speech language pathologist  -AS physical therapist  -LS,KR,LS2     Document Type therapy note (daily note)  -AS therapy note (daily note)  -LS,KR,LS2     Subjective Information agree to therapy;complains of;pain  -AS agree to therapy;no complaints  -LS,KR,LS2     Patient Effort, Rehab Treatment adequate  -AS good  -LS,KR,LS2     Symptoms Noted During/After Treatment  none  -LS,KR,LS2     Recorded by [AS] Ya Richmond MS CCC-SLP [LS,KR,LS2] Loretta Todd, PT (r) Aleyda Howard PT Student (t) Loretta Todd, PT (c)     Vital Signs    Pre Systolic BP Rehab  96  -LS,KR,LS2     Pre Treatment Diastolic BP  61  -LS,KR,LS2     Post Systolic BP Rehab  121  -LS,KR,LS2     Post Treatment Diastolic BP  69  -LS,KR,LS2     Pretreatment Heart Rate (beats/min)  88  -LS,KR,LS2     Posttreatment Heart Rate (beats/min)  88  -LS,KR,LS2      Pre SpO2 (%)  97  -LS,KR,LS2     O2 Delivery Pre Treatment  supplemental O2  -LS,KR,LS2     Post SpO2 (%)  100  -LS,KR,LS2     O2 Delivery Post Treatment  supplemental O2  -LS,KR,LS2     Pre Patient Position  Sitting  -LS,KR,LS2     Intra Patient Position  Standing  -LS,KR,LS2     Post Patient Position  Sitting  -LS,KR,LS2     Recorded by  [LS,KR,LS2] Loretta Todd, PT (r) Aleyda Howard, PT Student (t) Loretta Todd, PT (c)     Pain Assessment    Pain Assessment Walden-Alejandro FACES  -AS 0-10  -LS,KR,LS2     Walden-Alejandro FACES Pain Rating 4  -AS      Pain Score  0  -LS,KR,LS2     Post Pain Score 4  -AS 0  -LS,KR,LS2     Pain Type Surgical pain  -AS      Pain Location Sternum  -AS      Pain Intervention(s) Other (Comment)   RN notified, pt requested pain medication  -AS      Recorded by [AS] Ya Richmond, MS CCC-SLP [LS,KR,LS2] Loretta Todd, PT (r) Aleyda Howard, PT Student (t) Loretta Todd, PT (c)     Cognitive Assessment/Intervention    Current Cognitive/Communication Assessment  impaired  -LS,KR,LS2     Orientation Status  oriented to;person;unable/difficult to assess   dysphagia from prior CVA  -LS,KR,LS2     Follows Commands/Answers Questions  able to follow single-step instructions;75% of the time;needs cueing;needs increased time;needs repetition  -LS,KR,LS2     Personal Safety  moderate impairment;decreased awareness, need for assist;decreased awareness, need for safety;decreased insight to deficits  -LS,KR,LS2     Personal Safety Interventions  fall prevention program maintained;gait belt;nonskid shoes/slippers when out of bed  -LS,KR,LS2     Recorded by  [LS,KR,LS2] Loretta Todd, PT (r) Aleyda Howard, PT Student (t) Loretta Todd, PT (c)     Swallow Assessment/Intervention    Additional Documentation Dysphagia/Swallow Intervention (Group)  -AS      Recorded by [AS] Ya Richmond, MS CCC-SLP      Dysphagia/Swallow Intervention    Dysphagia/Swallow Intervention Pt/family/RN reported pt having no  difficulty tolerating regular diet and thin liquids. Trialed thin via cup, solid cracker. Pt taking large bites of food, but small sips via cup w/o cues. No overt clinical s/sxs aspiration observed. Provided edu re: swallow precautions, avoiding straw use, risks of aspiration. Pt/family stated understanding. Pt preparing to d/c hospital for home as soon as wife arrives. Provided contact info should any ?s/concerns arise. Pt stated understanding.  -AS      Recorded by [AS] Ya Richmond MS CCC-SLP      Bed Mobility, Assessment/Treatment    Bed Mobility, Comment  C  -LS,KR,LS2     Recorded by  [LS,KR,LS2] Loretta Todd, PT (r) Aleyda Howard, PT Student (t) Loretta Todd, PT (c)     Transfer Assessment/Treatment    Transfers, Sit-Stand Watson  minimum assist (75% patient effort);2 person assist required;verbal cues required  -LS,KR,LS2     Transfers, Stand-Sit Watson  minimum assist (75% patient effort);2 person assist required;verbal cues required  -LS,KR,LS2     Transfer, Safety Issues  balance decreased during turns;step length decreased;sequencing ability decreased  -LS,KR,LS2     Transfer, Impairments  impaired balance;coordination impaired  -LS,KR,LS2     Transfer, Comment  Pt declined use of RW for balance, but held onto transport monitor. VC's for upright posture.   -LS,KR,LS2     Recorded by  [LS,KR,LS2] Loretta Todd, PT (r) Aleyda Howard, PT Student (t) Loretta Todd, PT (c)     Gait Assessment/Treatment    Gait, Watson Level  contact guard assist;2 person assist required;verbal cues required  -LS,KR,LS2     Gait, Distance (Feet)  300  -LS,KR,LS2     Gait, Gait Pattern Analysis  swing-to gait  -LS,KR,LS2     Gait, Gait Deviations  step length decreased;forward flexed posture  -LS,KR,LS2     Gait, Safety Issues  balance decreased during turns;sequencing ability decreased;step length decreased;supplemental O2  -LS,KR,LS2     Gait, Impairments  impaired balance  -LS,KR,LS2     Gait,  Comment  Pt requires VC's for direction and posture. Slow to follow commands.  -LS,KR,LS2     Recorded by  [LS,KR,LS2] Loretta Todd, PT (r) Aleyda Howard, PT Student (t) Loretta Todd, PT (c)     Therapy Exercises    Bilateral Lower Extremities  AROM:;sitting;ankle pumps/circles;hip flexion;LAQ;10 reps   difficulty following instructions  -LS,KR,LS2     Recorded by  [LS,KR,LS2] Loretta Todd, PT (r) Aleyda Howard, PT Student (t) Loretta Todd, PT (c)     Positioning and Restraints    Pre-Treatment Position  sitting in chair/recliner  -LS,KR,LS2     Post Treatment Position  chair  -LS,KR,LS2     In Chair  sitting;call light within reach;encouraged to call for assist;with family/caregiver;with nsg  -LS,KR,LS2     Recorded by  [LS,KR,LS2] Loretta Todd, PT (r) Aleyda Howard, PT Student (t) Loretta Todd, PT (c)       User Key  (r) = Recorded By, (t) = Taken By, (c) = Cosigned By    Initials Name Effective Dates    AS Ya Richmond MS CCC-SLP 06/22/15 -     ALEXIA Todd, PT 06/19/15 -     RON Howard, PT Student 05/30/17 -               IP SLP Goals       07/04/17 1247 06/30/17 1131       Safely Consume Diet    Safely Consume Diet- SLP, Date Established  06/30/17  -RD     Safely Consume Diet- SLP, Time to Achieve  by discharge  -RD     Safely Consume Diet- SLP, Date Goal Reviewed 07/04/17  -AS 06/30/17  -RD     Safely Consume Diet- SLP, Outcome goal met  -AS goal ongoing  -RD       User Key  (r) = Recorded By, (t) = Taken By, (c) = Cosigned By    Initials Name Provider Type    AS Ya Richmond MS CCC-SLP Speech and Language Pathologist    JOSUE Almaguer, MS CF-SLP Speech and Language Pathologist          EDUCATION  The patient has been educated in the following areas:   Dysphagia (Swallowing Impairment) Oral Care/Hydration risks of aspiration, swallow precautions, avoid straw use.    SLP Recommendation and Plan  Plan of Care Review  Plan Of Care Reviewed With: patient, sibling  Progress:  progress toward functional goals as expected  Outcome Summary/Follow up Plan: Pt/family/RN reported pt having no difficulty tolerating regular diet and thin liquids. Trialed thin via cup, solid cracker. Pt taking large bites of food, but small sips via cup w/o cues. No overt clinical s/sxs aspiration observed. Provided edu re: swallow precautions, avoiding straw use, risks of aspiration. Pt/family stated understanding. Pt preparing to d/c hospital for home as soon as wife arrives. Provided contact info should any ?s/concerns arise. Pt stated understanding.    Time Calculation:         Time Calculation- SLP       07/04/17 1245          Time Calculation- SLP    SLP Start Time 1230  -AS      SLP Received On 07/04/17  -AS        User Key  (r) = Recorded By, (t) = Taken By, (c) = Cosigned By    Initials Name Provider Type    AS Ya Richmond MS CCC-SLP Speech and Language Pathologist        Therapy Charges for Today     Code Description Service Date Service Provider Modifiers Qty    [id number] HC ST TREATMENT SWALLOW 1 7/4/2017 Ya Richmond MS CCC-SLP GN 1          SLP Discharge Summary  Reason for Discharge: All goals achieved, Discharge from facility  Outcomes Achieved: Able to achieve all goals within established timeline  Discharge Destination: Home with assist    Ya Richmond MS CCC-SLP  7/4/2017

## 2017-07-04 NOTE — PROGRESS NOTES
CTS Progress Note       LOS: 6 days   Patient Care Team:  Félix Blair MD as PCP - General (Internal Medicine)        Vital Signs  Temp:  [97.4 °F (36.3 °C)-98.3 °F (36.8 °C)] 97.4 °F (36.3 °C)  Heart Rate:  [79-94] 82  Resp:  [17-20] 20  BP: ()/(57-73) 99/64    Physical Exam:  Sternum stable     Results     Results from last 7 days  Lab Units 07/04/17  0412   WBC 10*3/mm3 11.98*   HEMOGLOBIN g/dL 7.5*   HEMATOCRIT % 22.7*   PLATELETS 10*3/mm3 290       Results from last 7 days  Lab Units 07/04/17  0412   SODIUM mmol/L 133   POTASSIUM mmol/L 3.4*   CHLORIDE mmol/L 99   CO2 mmol/L 28.0   BUN mg/dL 14   CREATININE mg/dL 0.70   GLUCOSE mg/dL 92   CALCIUM mg/dL 9.5           Imaging Results (last 24 hours)     Procedure Component Value Units Date/Time    XR Chest 1 View [[id number]] Collected:  07/03/17 0828     Updated:  07/03/17 1002    Narrative:       EXAMINATION: XR CHEST 1 VW-      INDICATION: Followup pneumothorax; Z74.09-Other reduced mobility;  I25.10-Atherosclerotic heart disease of native coronary artery without  angina pectoris; R13.10-Dysphagia, unspecified.      COMPARISON: 07/02/2017.     FINDINGS: Portable chest reveals heart to be enlarged. There is a small  left pleural effusion with mild increased markings at the left lung  base. There is prominence identified of the pulmonary vascularity with  deep line catheter identified on the right. The pneumothorax is somewhat  improving on the left and decreasing in size.       Impression:       Slight decrease seen in size of the left pneumothorax. Small  left pleural effusion with mild increased markings at the left lung  base. Mild increased marking is again seen within the right midlung.     D:  07/03/2017  E:  07/03/2017     This report was finalized on 7/3/2017 10:00 AM by Dr. Peggy Key MD.             Assessment    Principal Problem:    Coronary artery disease (S/P CABGx4 on 6/28/17)  Active Problems:    Hyperlipidemia     Hypertension    Combined receptive/expressive aphasia due to CVA 2014    Former smoker (0 x 2014)    Chronic pain (Chronic narcs ?)    Paroxysmal atrial fibrillation    Spontaneous Pneumothorax on left. Getting smaller on 7/3/17 chest x-ray    5 AM chest x-ray is still not in the system at 649.  This makes decision making on rounds problematic    Plan   DC home when satisfactory with cardiology .   However I'm still awaiting today's chest x-ray    Please note that portions of this note were completed with a voice recognition program. Efforts were made to edit the dictations, but occasionally words are mistranscribed.    Ronal Anthony MD  07/04/17  6:48 AM

## 2017-07-04 NOTE — PROGRESS NOTES
"[facility]  PROGRESS NOTE      Date of Admission: 6/28/2017  Length of Stay: 6  Primary Care Physician: Félix Blair MD    Chief Complaint: Follow-up ACS:CABG    Problem List: Principal Problem:    Coronary artery disease (S/P CABGx4 on 6/28/17)  Active Problems:    Hyperlipidemia    Hypertension    Combined receptive/expressive aphasia due to CVA 2014    Former smoker (0 x 2014)    Chronic pain (Chronic narcs ?)    Paroxysmal atrial fibrillation    Spontaneous Pneumothorax on left. Getting smaller on 7/3/17 chest x-ray      Subjective      HPI: He is asymptomatic at rest. He has been \"OK'D\" for discharge by CTS.    Objective   Vital Signs:  Temp:  [97.4 °F (36.3 °C)-98 °F (36.7 °C)] 97.4 °F (36.3 °C)  Heart Rate:  [78-94] 84  Resp:  [16-20] 16  BP: ()/(57-73) 115/64    Intake/Output Summary (Last 24 hours) at 07/04/17 1441  Last data filed at 07/04/17 1300   Gross per 24 hour   Intake             1390 ml   Output             1225 ml   Net              165 ml     Flowsheet Rows         First Filed Value    Admission Height  67\" (170.2 cm) Documented at 06/28/2017 2000    Admission Weight  162 lb 6 oz (73.7 kg) Documented at 06/28/2017 2000          Physical Exam:   HEENT: Fundiscopic deferred, otherwise unremarkable.  NECK: No Jugular venous distention, adenopathy, or thyromegaly noted.   HEART: No discrete PMI is noted. The 1st and 2nd heart sounds are normal, and no murmurs, gallops, rubs, clicks, or other sounds are noted. Sternotomy is not remarkable.  LUNGS: Clear to auscultation.  ABDOMEN: Flat without evidence of organomegaly, masses, or tenderness.  GENITOURINARY/RECTAL: Deferred.  NEUROLOGIC: No focal abnormalities involving strength or sensation are noted.   EXTREMITIES: No clubbing, cyanosis, or edema noted. Peripheral pulses are present.        Results Review:     Results from last 7 days  Lab Units 07/04/17  0412 07/03/17  0335 07/02/17  0329 " 07/01/17  0337 06/30/17 0319 06/29/17  0353 06/29/17  0215 06/28/17  2049   SODIUM mmol/L 133 137 136 137 135 139  --  140   POTASSIUM mmol/L 3.4* 3.7 4.1 4.6 4.0 4.1 3.9 4.2   CHLORIDE mmol/L 99 101 100 103 107 109  --  107   CO2 mmol/L 28.0 28.0 32.0* 30.0 25.0 23.0  --  23.0   BUN mg/dL 14 15 13 15 11 16  --  15   CREATININE mg/dL 0.70 0.70 0.90 0.80 0.80 1.30  --  1.10   GLUCOSE mg/dL 92 121* 126* 125* 130* 132*  --  138*   CALCIUM mg/dL 9.5 9.3 9.5 9.8 9.1 9.3  --  9.5           Results from last 7 days  Lab Units 07/04/17  0412 07/03/17 0335 07/02/17 0329 07/01/17 0337 06/30/17 0319 06/29/17 0353 06/29/17 0215 06/28/17  2049   WBC 10*3/mm3 11.98* 13.46* 14.22* 20.42* 17.60* 14.57*  --   --  16.75*   HEMOGLOBIN g/dL 7.5* 8.6* 8.7* 9.3* 8.0* 7.9* 8.3*  < > 8.5*   HEMATOCRIT % 22.7* 26.1* 26.6* 28.2* 23.9* 24.4* 25.6*  < > 25.6*   PLATELETS 10*3/mm3 290 247 187 163 169 240  --   --  206   < > = values in this interval not displayed.    Results from last 7 days  Lab Units 06/29/17  0353 06/28/17  1717   INR  1.01 1.14   APTT seconds  --  27.1       Results from last 7 days  Lab Units 07/01/17  0337   MAGNESIUM mg/dL 2.1                       Current Medications:    aspirin 325 mg Oral Daily   atorvastatin 40 mg Oral Nightly   famotidine 20 mg Oral BID   guaiFENesin 1,200 mg Oral Q12H   heparin (porcine) 5,000 Units Subcutaneous Q8H   ipratropium-albuterol 3 mL Nebulization 4x Daily - RT   metoprolol tartrate 12.5 mg Oral Q12H   pharmacy consult - MTM  Does not apply Daily   sennosides-docusate sodium 2 tablet Oral BID   sodium chloride 1-10 mL Intravenous Q8H            I reviewed the patient's new clinical results.  I personally viewed and interpreted the patient's EKG/Telemetry data    Assessment and Plan:     He is ready for discharge following multi-vessel CABG (with underlying normal LV systolic function).  Follow-up abnormal CXR (see VANDANA Anthony note today) per CTS and AA) before return to [place]  [facility].      George Gross MD  07/04/17  2:41 PM

## 2017-07-06 NOTE — DISCHARGE SUMMARY
CTS Discharge Summary    Patient Care Team:  Félix Blair MD as PCP - General (Internal Medicine)      Date of Admission: 6/28/2017 10:22 AM  Date of Discharge:  7/4/17    Discharge Diagnosis  Past Medical History:   Diagnosis Date   • Arthritis    • Carotid artery disease    • Cervical discogenic pain syndrome    • Chronic constipation    • Combined receptive and expressive aphasia due to cerebrovascular accident    • Coronary artery disease    • Hyperlipidemia    • Hypertension    • Indigestion    • Stroke     STROKE 2014.   • Wears glasses    • Wears partial dentures        Principal Problem:    Coronary artery disease (S/P CABGx4 on 6/28/17)  Active Problems:    Hyperlipidemia    Hypertension    Combined receptive/expressive aphasia due to CVA 2014    Former smoker (0 x 2014)    Chronic pain (Chronic narcs ?)    Paroxysmal atrial fibrillation    Spontaneous Pneumothorax on left. Getting smaller on 7/3/17 chest x-ray      History of Present Illness  This patient was referred to me to evaluate for coronary bypass surgery. This gentleman has had some substernal chest pain and subsequent positive stress test. Catheterization demonstrated multivessel coronary disease. It should be noted he had a stroke approximately 2-1/2 years ago in the another state which resulted in some dysarthria of speech and residual right arm paralysis. He has had a subsequent right sided carotid endarterectomy and stenting of the left internal carotid endarterectomy elsewhere. His wife accompanies him today and she provides additional history information. At this point the patient is pain-free and is breathing unlabored. He would like to discuss potential coronary bypass surgery.    Hospital Course  Patient is a 67 y.o. male admitted on 6/28/17 and underwent elective CABG x 4 without complications. Transported to the ICU, hemodynamically stable. Ventilator was weaned per protocol, and was extubated later that evening.   POD 1  Early that morning, patient became unresponsive, eyes rolled back, BP dropped to 60s. Levo gtt titrated, fluid bolus ordered. Patient regained consciousness. No new neurological deficits.   POD 2 Pain hard to manage due to chronic narcotic use. On low-dose levo, transfused 1 unit prbc. Chest tubes and pacing wires removed.   POD 3 Slow improvements, complaints of nonproductive cough.   POD 4 Levo gtt  Restarted for hypotension, slow improvements.   POD 5 Off pressors, transferred to tele. Asymptomatic pneumothorax noted on CXR.   POD 6 Discharged home    Procedures Performed  Procedure(s): 6/28/17  MEDIAN STERNOTOMY, CORONARY ARTERY BYPASS graft X 4  UTILIZING THE LEFT INTERNAL MAMMARY ARTERY  AND EVH OF THE LEFT GREATER SAPHENOUS VEIN EXPLORATION OF THE RIGHT GREATER SAPHENOUS VEIN        Consults:   Consults     Date and Time Order Name Status Description    6/28/2017 1653 Inpatient Consult to Cardiology Completed             Discharge Medications   Aly Gabriel III   Home Medication Instructions СВЕТЛАНА:[id number]    Printed on:07/06/17 1031   Medication Information                      aspirin 81 MG tablet  Take 1 tablet by mouth Daily.             atorvastatin (LIPITOR) 40 MG tablet  Take 1 tablet by mouth Every Night.             docusate sodium (COLACE) 100 MG capsule  Take 200 mg by mouth 2 (Two) Times a Day.             HYDROcodone-acetaminophen (NORCO) 5-325 MG per tablet  Take 1 tablet by mouth Every 6 (Six) Hours As Needed for Moderate Pain (4-6).             metoprolol tartrate (LOPRESSOR) 25 MG tablet  Take 0.5 tablets by mouth Every 12 (Twelve) Hours.             nitroglycerin (NITROSTAT) 0.4 MG SL tablet  1 under the tongue as needed for angina, may repeat q5mins for up three doses                 Discharge Diet:     Activity at Discharge:     Follow-up Appointments  Future Appointments  Date Time Provider Department Center   7/10/2017 11:00 AM JE Blount CD [facility] None          Cecilia NULL  JE Torres  07/06/17  8:50 AM

## 2017-07-07 NOTE — THERAPY DISCHARGE NOTE
Acute Care - Physical Therapy Discharge Summary  [facility]       Patient Name: Aly Gabriel III  : 1950  MRN: [id number]    Today's Date: 2017  Onset of Illness/Injury or Date of Surgery Date: 17    Date of Referral to PT: 17  Referring Physician: MD Anthony      Admit Date: 2017      PT Recommendation and Plan    Visit Dx:    ICD-10-CM ICD-9-CM   1. Impaired functional mobility, balance, gait, and endurance Z74.09 V49.89   2. CAD in native artery I25.10 414.01   3. Dysphagia, unspecified type R13.10 787.20                       IP PT Goals       17 1051 17 1517       Bed Mobility PT LTG    Bed Mobility PT LTG, Date Established  17  -JESSE     Bed Mobility PT LTG, Time to Achieve  2 wks  -JESSE     Bed Mobility PT LTG, Activity Type  supine to sit/sit to supine  -JESSE     Bed Mobility PT LTG, [place] Level  independent  -JESSE     Bed Mobility PT LTG, Outcome goal ongoing  -LS (r) KR (t) LS (c) goal ongoing  -JESSE     Transfer Training PT LTG    Transfer Training PT LTG, Date Established  17  -JESSE     Transfer Training PT LTG, Time to Achieve  2 wks  -JESSE     Transfer Training PT LTG, Activity Type  sit to stand/stand to sit  -JESSE     Transfer Training PT LTG, [place] Level  independent  -JESSE     Transfer Training PT LTG, Outcome goal ongoing  -LS (r) KR (t) LS (c) goal ongoing  -JESSE     Gait Training PT LTG    Gait Training Goal PT LTG, Date Established  17  -JESSE     Gait Training Goal PT LTG, Time to Achieve  2 wks  -JESSE     Gait Training Goal PT LTG, [place] Level  independent  -JESSE     Gait Training Goal PT LTG, Distance to Achieve  300  -JESSE     Gait Training Goal PT LTG, Outcome goal partially met   met for distance with CGA  -LS (r) KR (t) LS (c) goal ongoing  -JESSE       User Key  (r) = Recorded By, (t) = Taken By, (c) = Cosigned By    Initials Name Provider Type    JESSE Alba, PT Physical Therapist    ALEXIA Todd, PT Physical Therapist    KR  Aleyda Howard, PT Student PT Student            Goals Status: Treatment plan discontinued secondary to discharge from acute facility.  PT Discharge Summary  Reason for Discharge: Discharge from facility  Outcomes Achieved: Refer to plan of care for updates on goals achieved  Discharge Destination: Home with assist      Natalia Rivera, PT   7/7/2017

## 2017-07-10 ENCOUNTER — OFFICE VISIT (OUTPATIENT)
Dept: CARDIOLOGY | Facility: CLINIC | Age: 67
End: 2017-07-10

## 2017-07-10 VITALS
BODY MASS INDEX: 25.58 KG/M2 | WEIGHT: 163 LBS | SYSTOLIC BLOOD PRESSURE: 121 MMHG | DIASTOLIC BLOOD PRESSURE: 79 MMHG | HEIGHT: 67 IN | OXYGEN SATURATION: 98 % | HEART RATE: 80 BPM

## 2017-07-10 DIAGNOSIS — I25.10 CORONARY ARTERY DISEASE INVOLVING NATIVE CORONARY ARTERY OF NATIVE HEART WITHOUT ANGINA PECTORIS: ICD-10-CM

## 2017-07-10 DIAGNOSIS — I10 ESSENTIAL HYPERTENSION: ICD-10-CM

## 2017-07-10 DIAGNOSIS — R06.02 SHORTNESS OF BREATH: Primary | ICD-10-CM

## 2017-07-10 PROCEDURE — 99214 OFFICE O/P EST MOD 30 MIN: CPT | Performed by: PHYSICIAN ASSISTANT

## 2017-07-10 RX ORDER — HYDROCHLOROTHIAZIDE 25 MG/1
25 TABLET ORAL DAILY
Qty: 30 TABLET | Refills: 11 | Status: SHIPPED | OUTPATIENT
Start: 2017-07-10 | End: 2018-03-14

## 2017-07-10 NOTE — PROGRESS NOTES
Problem list     Subjective   Aly Gabriel III is a 67 y.o. male     Chief Complaint   Patient presents with   • Follow-up     patient appears in office today for follow up ; had CABG procedure per Dr. Anthony on 06/28/2017       HPI    Problem list  1. Three-vessel coronary artery disease  1.1 cardiac catheterization May 2017 because of inferobasal, diaphragmatic, and distal lateral ischemia demonstrating severe three-vessel disease with recommendations for mechanical revascularization.   1.2 Coronary artery bypass grafting with LIMA to LAD, vein graft to OM, vein graft to PDA and posterolateral branch by Dr. Ronal Anthony June 2017   2. Preserved systolic function  3. CVA  3.1 CVA in 2015 [place], [place], with residual right arm paralysis and dysarthria  3.2 carotid endarterectomy of the right internal carotid artery 2015 and stenting of the left internal carotid artery  3.3 CTA of the carotids June 2017 demonstrated no evidence of hemodynamically significant stenosis   4. Hypertension  5. Dyslipidemia    Patient is a 67-year-old male that presents back for follow-up from surgery. He has done remarkably well. He does not describe any chest pain or chest pressure. He has mild dyspnea when exerting but nothing that has been progressive. Denies PND orthopnea. Does not palpitate or have dysrhythmic symptoms. He has had mild lower extremity edema but otherwise voices no complaints.        Outpatient Encounter Prescriptions as of 7/10/2017   Medication Sig Dispense Refill   • aspirin 81 MG tablet Take 1 tablet by mouth Daily. 30 tablet 11   • atorvastatin (LIPITOR) 40 MG tablet Take 1 tablet by mouth Every Night. 30 tablet 11   • docusate sodium (COLACE) 100 MG capsule Take 200 mg by mouth 2 (Two) Times a Day.     • HYDROcodone-acetaminophen (NORCO) 5-325 MG per tablet Take 1 tablet by mouth Every 6 (Six) Hours As Needed for Moderate Pain (4-6).     • metoprolol tartrate (LOPRESSOR) 25 MG tablet Take 0.5 tablets by  mouth Every 12 (Twelve) Hours. 30 tablet 11   • nitroglycerin (NITROSTAT) 0.4 MG SL tablet 1 under the tongue as needed for angina, may repeat q5mins for up three doses (Patient taking differently: Place 0.4 mg under the tongue Every 5 (Five) Minutes As Needed for Chest Pain. 1 under the tongue as needed for angina, may repeat q5mins for up three doses) 100 tablet 11   • hydrochlorothiazide (HYDRODIURIL) 25 MG tablet Take 1 tablet by mouth Daily. 30 tablet 11     Facility-Administered Encounter Medications as of 7/10/2017   Medication Dose Route Frequency Provider Last Rate Last Dose   • Chlorhexidine Gluconate Cloth 2 % pads 1 application  1 application Topical Q12H PRN JE Delgado           Review of patient's allergies indicates no known allergies.    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   • Wears glasses    • Wears partial dentures        Social History     Social History   • Marital status:      Spouse name: N/A   • Number of children: 0   • Years of education: N/A     Occupational History   • GM Retired     Social History Main Topics   • Smoking status: Former Smoker     Packs/day: 2.00     Years: 30.00     Types: Cigarettes     Quit date: 6/19/2014   • Smokeless tobacco: Never Used   • Alcohol use No   • Drug use: No   • Sexual activity: Yes     Partners: Female      Comment: spouse     Other Topics Concern   • Not on file     Social History Narrative       Family History   Problem Relation Age of Onset   • Heart disease Mother    • Heart disease Father        Review of Systems   Constitutional: Negative.  Negative for fatigue.   HENT: Negative.    Eyes: Positive for visual disturbance (wears  "glasses daily).   Respiratory: Positive for cough (productive cough since CABG; getting better over time) and shortness of breath. Negative for chest tightness and wheezing.    Cardiovascular: Positive for leg swelling (BLE swelling since procedure). Negative for chest pain and palpitations.   Gastrointestinal: Negative for abdominal pain, nausea and vomiting.   Endocrine: Negative.  Negative for cold intolerance, heat intolerance, polyphagia and polyuria.   Genitourinary: Negative.  Negative for difficulty urinating, frequency and urgency.   Musculoskeletal: Negative.  Negative for arthralgias, back pain, myalgias, neck pain and neck stiffness.   Skin: Positive for wound (from CABG). Negative for rash.   Allergic/Immunologic: Negative.  Negative for environmental allergies and food allergies.   Neurological: Negative.  Negative for dizziness, weakness, light-headedness and headaches.   Hematological: Bruises/bleeds easily (pt wife states he bruises and bleeds easily).   Psychiatric/Behavioral: Positive for agitation (easily aggitated) and confusion (easily confused). Negative for sleep disturbance. The patient is not nervous/anxious.        Objective     /79 (BP Location: Left arm, Patient Position: Sitting)  Pulse 80  Ht 67\" (170.2 cm)  Wt 163 lb (73.9 kg)  SpO2 98%  BMI 25.53 kg/m2    Lab Results (most recent)     None          Physical Exam   Constitutional: He is oriented to person, place, and time. He appears well-developed and well-nourished. No distress.   HENT:   Head: Normocephalic and atraumatic.   Eyes: EOM are normal. Pupils are equal, round, and reactive to light.   Neck: No JVD present.   Cardiovascular: Normal rate, regular rhythm and normal heart sounds.  Exam reveals no gallop and no friction rub.    No murmur heard.  Pulmonary/Chest: Effort normal and breath sounds normal. No respiratory distress. He has no wheezes. He has no rales. He exhibits no tenderness.   Abdominal: Soft. "   Musculoskeletal: Normal range of motion. He exhibits edema.   Neurological: He is alert and oriented to person, place, and time. No cranial nerve deficit.   Skin: Skin is warm and dry. No rash noted. No erythema. No pallor.   Psychiatric: He has a normal mood and affect. His behavior is normal.   Nursing note and vitals reviewed.      Procedure   Procedures       Assessment/Plan     Problems Addressed this Visit        Cardiovascular and Mediastinum    Coronary artery disease involving native coronary artery of native heart without angina pectoris    Relevant Orders    Adult Transthoracic Echo Complete    Essential hypertension    Relevant Medications    hydrochlorothiazide (HYDRODIURIL) 25 MG tablet    Other Relevant Orders    Adult Transthoracic Echo Complete       Respiratory    Shortness of breath - Primary    Relevant Orders    Adult Transthoracic Echo Complete              Recommendation  1. Patient doing well at this time. He is on appropriate medications for treatment of his coronary artery disease. He has no symptoms to suggest ischemia. He does have mild lower extremity edema which could likely be from the vein harvesting. I would like to perform an echocardiogram to reevaluate LV function post bypass. I am starting him on a low-dose diuretic as needed for lower extremity swelling.  2. We will see him back for follow-up after testing. Follow-up with Dr. Anthony scheduled.

## 2017-08-07 ENCOUNTER — HOSPITAL ENCOUNTER (OUTPATIENT)
Dept: CARDIOLOGY | Facility: HOSPITAL | Age: 67
Discharge: HOME | End: 2017-08-07
Admitting: PHYSICIAN ASSISTANT

## 2017-08-07 ENCOUNTER — OUTSIDE FACILITY SERVICE (OUTPATIENT)
Dept: CARDIOLOGY | Facility: CLINIC | Age: 67
End: 2017-08-07

## 2017-08-07 PROCEDURE — 93306 TTE W/DOPPLER COMPLETE: CPT | Performed by: INTERNAL MEDICINE

## 2017-08-07 PROCEDURE — 93306 TTE W/DOPPLER COMPLETE: CPT

## 2017-08-10 ENCOUNTER — OFFICE VISIT (OUTPATIENT)
Dept: CARDIAC SURGERY | Facility: CLINIC | Age: 67
End: 2017-08-10

## 2017-08-10 VITALS
OXYGEN SATURATION: 98 % | TEMPERATURE: 97.6 F | DIASTOLIC BLOOD PRESSURE: 96 MMHG | WEIGHT: 167 LBS | BODY MASS INDEX: 25.31 KG/M2 | HEART RATE: 75 BPM | SYSTOLIC BLOOD PRESSURE: 144 MMHG | HEIGHT: 68 IN

## 2017-08-10 DIAGNOSIS — Z95.1 S/P CABG (CORONARY ARTERY BYPASS GRAFT): Primary | ICD-10-CM

## 2017-08-10 DIAGNOSIS — I25.10 CORONARY ARTERY DISEASE INVOLVING NATIVE CORONARY ARTERY OF NATIVE HEART WITHOUT ANGINA PECTORIS: ICD-10-CM

## 2017-08-10 PROCEDURE — 99024 POSTOP FOLLOW-UP VISIT: CPT | Performed by: PHYSICIAN ASSISTANT

## 2017-08-10 PROCEDURE — 71020 CHG CHEST X-RAY 2 VW: CPT | Performed by: THORACIC SURGERY (CARDIOTHORACIC VASCULAR SURGERY)

## 2017-08-10 NOTE — PROGRESS NOTES
08/10/2017  Patient Information  Aly Gabriel III                                                                                          [address]   [facility] [address]   1950  'PCP/Referring Physician'  Félix Blair MD  [phone]  No ref. provider found    Chief Complaint   Patient presents with   • Post-op     Hospital follow up s/p CABG 6/28/17, complains of left leg pain and difficulty regulating his blood    • Coronary Artery Disease       History of Present Illness:  Mr. Gabriel presents to office today for follow up of 6/28/17 CABG x 4. The patient denies chest pain or shortness of breath. He has been ambulating without any difficulty since being discharged. The patient is slated to follow up with Dr. Ryan who recently performed an ultrasound of the heart. The patient's wife recently stopped giving him his Metoprolol due to low blood pressure, but I encouraged her to contact his office so Dr. Ryan would be able to adjust his medication personally. Mr. Gabriel has refused cardiac rehab today when I offered him a referral from our office. The patient is otherwise doing well. I reviewed the results of his pre-op carotid angiogram which revealed normal carotid arteries.     Patient Active Problem List   Diagnosis   • Shortness of breath   • Palpitations   • Dyspnea   • Coronary artery disease involving native coronary artery of native heart without angina pectoris   • Coronary artery disease (S/P CABGx4 on 6/28/17)   • Hyperlipidemia   • Hypertension   • Combined receptive/expressive aphasia due to CVA 2014   • Former smoker (0 x 2014)   • Chronic pain (Chronic narcs ?)   • Paroxysmal atrial fibrillation   • Spontaneous Pneumothorax on left. Getting smaller on 7/3/17 chest x-ray   • Essential hypertension     Past Medical History:   Diagnosis Date   • Arthritis    • Carotid artery disease    • Cervical discogenic pain syndrome    • Chronic constipation    • Combined receptive and expressive  aphasia due to cerebrovascular accident    • Coronary artery disease    • Hyperlipidemia    • Hypertension    • Indigestion    • Stroke     STROKE 2014.   • Wears glasses    • Wears partial dentures      Past Surgical History:   Procedure Laterality Date   • ANKLE SURGERY     • CAROTID ARTERY ANGIOPLASTY     • CAROTID ENDARTERECTOMY Right 2014   • CAROTID ENDARTERECTOMY     • CORONARY ARTERY BYPASS GRAFT N/A 6/28/2017    Procedure: MEDIAN STERNOTOMY, CORONARY ARTERY BYPASS graft X 4  UTILIZING THE LEFT INTERNAL MAMMARY ARTERY  AND EVH OF THE LEFT GREATER SAPHENOUS VEIN EXPLORATION OF THE RIGHT GREATER SAPHENOUS VEIN ;  Surgeon: Ronal Anthony MD;  Location: [facility];  Service:        Current Outpatient Prescriptions:   •  aspirin 81 MG tablet, Take 1 tablet by mouth Daily., Disp: 30 tablet, Rfl: 11  •  atorvastatin (LIPITOR) 40 MG tablet, Take 1 tablet by mouth Every Night., Disp: 30 tablet, Rfl: 11  •  docusate sodium (COLACE) 100 MG capsule, Take 200 mg by mouth 2 (Two) Times a Day., Disp: , Rfl:   •  hydrochlorothiazide (HYDRODIURIL) 25 MG tablet, Take 1 tablet by mouth Daily., Disp: 30 tablet, Rfl: 11  •  HYDROcodone-acetaminophen (NORCO) 5-325 MG per tablet, Take 1 tablet by mouth Every 6 (Six) Hours As Needed for Moderate Pain (4-6)., Disp: , Rfl:   •  metoprolol tartrate (LOPRESSOR) 25 MG tablet, Take 0.5 tablets by mouth Every 12 (Twelve) Hours., Disp: 30 tablet, Rfl: 11  •  nitroglycerin (NITROSTAT) 0.4 MG SL tablet, 1 under the tongue as needed for angina, may repeat q5mins for up three doses (Patient taking differently: Place 0.4 mg under the tongue Every 5 (Five) Minutes As Needed for Chest Pain. 1 under the tongue as needed for angina, may repeat q5mins for up three doses), Disp: 100 tablet, Rfl: 11  No current facility-administered medications for this visit.     Facility-Administered Medications Ordered in Other Visits:   •  Chlorhexidine Gluconate Cloth 2 % pads 1 application, 1 application,  "Topical, Q12H PRN, JE Delgado  No Known Allergies  Social History     Social History   • Marital status:      Spouse name: N/A   • Number of children: 0   • Years of education: N/A     Occupational History   • GM Retired     Social History Main Topics   • Smoking status: Former Smoker     Packs/day: 2.00     Years: 30.00     Types: Cigarettes     Quit date: 6/19/2014   • Smokeless tobacco: Never Used   • Alcohol use No   • Drug use: No   • Sexual activity: Yes     Partners: Female      Comment: spouse     Other Topics Concern   • Not on file     Social History Narrative     Family History   Problem Relation Age of Onset   • Heart disease Mother    • Heart disease Father      ROS: As per HPI, otherwise negative.   Vitals:    08/10/17 1142   BP: 144/96   BP Location: Right arm   Pulse: 75   Temp: 97.6 °F (36.4 °C)   SpO2: 98%   Weight: 167 lb (75.8 kg)   Height: 68\" (172.7 cm)      Physical Exam:  Gen - NAD, pleasant, cooperative  CV - Regular rate and rhythm, no murmur gallop or rub  Pulm - Lungs clear to auscultation without wheeze or rhonchi   GI - Soft, normoactive bowel sounds, non-tender  Ext - Without edema, bilateral EVH sites are healing well  Incision - Sternum stable, no evidence of incisional dehiscence or cellulitis    Labs/Imaging:   Good quality chest radiograph. Bony structures are within normal limits. Trachea is midline. Left and right lung fields clear, right costophrenic angle is sharp. There is some blunting of the left costophrenic angle. Sternal wires are manifestation of CABG procedure. No evidence of pneumothorax. Compared to most recent chest xray (7/4/17), there is no acute chest pathology.     Assessment/Plan:  Patient is doing well post-operatively. Sternum is stable and the patient is ambulating often. Dr. Ryan will manage cardiovascular medications. I instructed the patient to keep a log of his blood pressures TID, for Dr. Ryan's review. Patient may follow up on a " PRN basis, but should call with any questions or concerns, should any arise.    Patient Active Problem List   Diagnosis   • Shortness of breath   • Palpitations   • Dyspnea   • Coronary artery disease involving native coronary artery of native heart without angina pectoris   • Coronary artery disease (S/P CABGx4 on 6/28/17)   • Hyperlipidemia   • Hypertension   • Combined receptive/expressive aphasia due to CVA 2014   • Former smoker (0 x 2014)   • Chronic pain (Chronic narcs ?)   • Paroxysmal atrial fibrillation   • Spontaneous Pneumothorax on left. Getting smaller on 7/3/17 chest x-ray   • Essential hypertension     Signed by:

## 2017-09-20 ENCOUNTER — OFFICE VISIT (OUTPATIENT)
Dept: CARDIOLOGY | Facility: CLINIC | Age: 67
End: 2017-09-20

## 2017-09-20 VITALS
BODY MASS INDEX: 25.85 KG/M2 | OXYGEN SATURATION: 96 % | SYSTOLIC BLOOD PRESSURE: 137 MMHG | DIASTOLIC BLOOD PRESSURE: 84 MMHG | HEIGHT: 68 IN | WEIGHT: 170.6 LBS | HEART RATE: 76 BPM

## 2017-09-20 DIAGNOSIS — I51.89 DIASTOLIC DYSFUNCTION: ICD-10-CM

## 2017-09-20 DIAGNOSIS — E78.5 HYPERLIPIDEMIA, UNSPECIFIED HYPERLIPIDEMIA TYPE: ICD-10-CM

## 2017-09-20 DIAGNOSIS — Z91.09 ENVIRONMENTAL ALLERGIES: ICD-10-CM

## 2017-09-20 DIAGNOSIS — I25.10 CORONARY ARTERY DISEASE INVOLVING NATIVE CORONARY ARTERY OF NATIVE HEART WITHOUT ANGINA PECTORIS: Primary | ICD-10-CM

## 2017-09-20 DIAGNOSIS — I10 ESSENTIAL HYPERTENSION: ICD-10-CM

## 2017-09-20 PROCEDURE — 99213 OFFICE O/P EST LOW 20 MIN: CPT | Performed by: NURSE PRACTITIONER

## 2017-09-20 RX ORDER — FUROSEMIDE 20 MG/1
20 TABLET ORAL DAILY PRN
Qty: 30 TABLET | Refills: 3 | Status: SHIPPED | OUTPATIENT
Start: 2017-09-20 | End: 2018-03-14

## 2017-09-20 RX ORDER — LORATADINE 10 MG/1
10 TABLET ORAL DAILY
Qty: 30 TABLET | Refills: 5 | Status: SHIPPED | OUTPATIENT
Start: 2017-09-20 | End: 2018-03-14

## 2017-09-20 NOTE — PROGRESS NOTES
Subjective   Aly Gabriel III is a 67 y.o. male     Chief Complaint   Patient presents with   • Follow-up     patient appears in office today for follow up echo results    • Chest Pain     patient and wife state he has not had any CP epsiodes since last OV   • Shortness of Breath     patient and wife deny pt having any SOA even with exertion    • Coronary Artery Disease     patient has H/O CAD       HPI    Problem list    1. Three-vessel coronary artery disease  1.1 cardiac catheterization May 2017 because of inferobasal, diaphragmatic, and distal lateral ischemia demonstrating severe three-vessel disease with recommendations for mechanical revascularization.   1.2 Coronary artery bypass grafting with LIMA to LAD, vein graft to OM, vein graft to PDA and posterolateral branch by Dr. Ronal Anthony June 2017   2. Preserved systolic function  2.1 Echo 8/7/17-mild LVH, EF 50-55%, diastolic dysfunction 2, basal inferoseptal, and apical septal wall segments are hypokinetic, mid inferoseptal wall segment is akinetic, mild MR, trace TR and AR, PA 30-35  3. CVA  3.1 CVA in 2015 [place], [place], with residual right arm paralysis and dysarthria  3.2 carotid endarterectomy of the right internal carotid artery 2015 and stenting of the left internal carotid artery  3.3 CTA of the carotids June 2017 demonstrated no evidence of hemodynamically significant stenosis   4. Hypertension  5. Dyslipidemia    Patient is a 67-year-old male who presents today for follow-up with his wife at his side.  He denies any chest pain, pressure, palpitations, fluttering, dizziness, presyncope, syncope, orthopnea, PND or edema.  He denies any shortness of breath with activity and has been doing very well.  He says he does get sore throat at times which they saw ENT prior to his open heart surgery and back exam was negative.  He does get postnasal drip he said so I will start him on an allergy medicine.  I also noted where is documented about  possible history of A. fib.  I discussed this with the patient's wife and she said that when she taken to [facility] this year at some point in time they told him he had A. fib.  Trinh  all the EKGs from the hospital from this year and none of them show any episodes of A. fib.  I discussed her in an event monitor to see if he has any episodes but at this time the patient does not want to do this.  I did advise that unfortunately if he has A. fib that is not treated there is a risk he can have another stroke.  He and his wife both understand.    We went over echocardiogram.    Current Outpatient Prescriptions   Medication Sig Dispense Refill   • aspirin 81 MG tablet Take 1 tablet by mouth Daily. 30 tablet 11   • atorvastatin (LIPITOR) 40 MG tablet Take 1 tablet by mouth Every Night. 30 tablet 11   • docusate sodium (COLACE) 100 MG capsule Take 200 mg by mouth 2 (Two) Times a Day.     • hydrochlorothiazide (HYDRODIURIL) 25 MG tablet Take 1 tablet by mouth Daily. 30 tablet 11   • HYDROcodone-acetaminophen (NORCO) 5-325 MG per tablet Take 1 tablet by mouth Every 6 (Six) Hours As Needed for Moderate Pain (4-6).     • metoprolol tartrate (LOPRESSOR) 25 MG tablet Take 0.5 tablets by mouth Every 12 (Twelve) Hours. 30 tablet 11   • nitroglycerin (NITROSTAT) 0.4 MG SL tablet 1 under the tongue as needed for angina, may repeat q5mins for up three doses (Patient taking differently: Place 0.4 mg under the tongue Every 5 (Five) Minutes As Needed for Chest Pain. 1 under the tongue as needed for angina, may repeat q5mins for up three doses) 100 tablet 11   • furosemide (LASIX) 20 MG tablet Take 1 tablet by mouth Daily As Needed (edema and/or 2 lb weight gain over night). 30 tablet 3   • loratadine (CLARITIN) 10 MG tablet Take 1 tablet by mouth Daily. 30 tablet 5     No current facility-administered medications for this visit.      Facility-Administered Medications Ordered in Other Visits   Medication Dose Route Frequency  Provider Last Rate Last Dose   • Chlorhexidine Gluconate Cloth 2 % pads 1 application  1 application Topical Q12H PRN JE Delgado           ALLERGIES    Review of patient's allergies indicates no known allergies.    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   • Wears glasses    • Wears partial dentures        Social History     Social History   • Marital status:      Spouse name: N/A   • Number of children: 0   • Years of education: N/A     Occupational History   • GM Retired     Social History Main Topics   • Smoking status: Former Smoker     Packs/day: 2.00     Years: 30.00     Types: Cigarettes     Quit date: 6/19/2014   • Smokeless tobacco: Never Used   • Alcohol use No   • Drug use: No   • Sexual activity: Yes     Partners: Female      Comment: spouse     Other Topics Concern   • Not on file     Social History Narrative       Family History   Problem Relation Age of Onset   • Heart disease Mother    • Heart disease Father        Review of Systems   Constitutional: Negative for diaphoresis and fatigue.   HENT: Positive for postnasal drip and sore throat. Negative for congestion, sinus pressure and sneezing.    Eyes: Positive for visual disturbance (wears glasses daily).   Respiratory: Negative for cough, chest tightness, shortness of breath (denies SOA even with exertion ) and wheezing.    Cardiovascular: Negative for chest pain (denies CP), palpitations (denies palpitatons/flutters) and leg swelling.   Gastrointestinal: Positive for nausea (occasional nausea). Negative for abdominal pain and vomiting.   Endocrine: Negative for cold intolerance, heat intolerance, polyphagia and polyuria.   Genitourinary:  "Positive for difficulty urinating (hard to go; seeing urologist on 09/22/2017). Negative for frequency and urgency.   Musculoskeletal: Positive for arthralgias (neck/back), back pain (due to arthritis and bone spurs) and neck pain (due to arthritis and spurs).   Skin: Negative.  Negative for rash and wound.   Allergic/Immunologic: Negative for environmental allergies and food allergies.   Neurological: Positive for speech difficulty (expressive aphasia s/p stroke  ). Negative for dizziness, weakness, light-headedness and headaches.   Hematological: Bruises/bleeds easily (bruises easily).   Psychiatric/Behavioral: Positive for agitation (easily agitated) and confusion (easily confused). Negative for sleep disturbance (denies waking up smothering/SOA). The patient is nervous/anxious (always nervous).        Objective   /84 (BP Location: Left arm, Patient Position: Sitting)  Pulse 76  Ht 68\" (172.7 cm)  Wt 170 lb 9.6 oz (77.4 kg)  SpO2 96%  BMI 25.94 kg/m2  Vitals:    09/20/17 1512   BP: 137/84   BP Location: Left arm   Patient Position: Sitting   Pulse: 76   SpO2: 96%   Weight: 170 lb 9.6 oz (77.4 kg)   Height: 68\" (172.7 cm)      Lab Results (most recent)     None        Physical Exam   Constitutional: He is oriented to person, place, and time. Vital signs are normal. He appears well-developed and well-nourished. He is active and cooperative.   HENT:   Head: Normocephalic.   Eyes: Lids are normal.   Wears glasses    Neck: Normal carotid pulses, no hepatojugular reflux and no JVD present. Carotid bruit is not present.   Cardiovascular: Normal rate, regular rhythm and normal heart sounds.    Pulses:       Radial pulses are 2+ on the right side, and 2+ on the left side.        Dorsalis pedis pulses are 2+ on the right side, and 2+ on the left side.        Posterior tibial pulses are 2+ on the right side, and 2+ on the left side.   Trace edema LLE; no edema RLE.    Pulmonary/Chest: Effort normal and breath " sounds normal.   Abdominal: Normal appearance and bowel sounds are normal.   Neurological: He is alert and oriented to person, place, and time.   Skin: Skin is warm and dry. Abrasion (right leg and palm ) noted.   Healed midsternum scar and healed VG left below knee    Psychiatric: He has a normal mood and affect. His behavior is normal. Judgment and thought content normal.   Expressive aphasia        Procedure   Procedures         Assessment/Plan      Diagnosis Plan   1. Essential hypertension     2. Hyperlipidemia, unspecified hyperlipidemia type     3. Coronary artery disease involving native coronary artery of native heart without angina pectoris     4. Environmental allergies  loratadine (CLARITIN) 10 MG tablet   5. Diastolic dysfunction  furosemide (LASIX) 20 MG tablet       Return in about 3 months (around 12/20/2017).       hypertension-patient doing well.  Hyperlipidemia-patient is on Lipitor monitor by PCP.  CAD-patient is on aspirin, statin and beta.  Environmental allergies-patient will start Claritin.  Diastolic dysfunction-patient will use Lasix when necessary for edema and/or 2 pound weight gain overnight.  I educated he and his wife on how to do daily weights.  He will continue his medication regimen.  He will follow-up in 3 months or sooner if any changes.

## 2018-03-14 ENCOUNTER — OFFICE VISIT (OUTPATIENT)
Dept: CARDIOLOGY | Facility: CLINIC | Age: 68
End: 2018-03-14

## 2018-03-14 VITALS
SYSTOLIC BLOOD PRESSURE: 130 MMHG | HEIGHT: 68 IN | HEART RATE: 91 BPM | BODY MASS INDEX: 25.76 KG/M2 | OXYGEN SATURATION: 95 % | WEIGHT: 170 LBS | DIASTOLIC BLOOD PRESSURE: 87 MMHG

## 2018-03-14 DIAGNOSIS — J81.1 CHRONIC PULMONARY EDEMA: ICD-10-CM

## 2018-03-14 DIAGNOSIS — R06.02 SHORTNESS OF BREATH: ICD-10-CM

## 2018-03-14 DIAGNOSIS — R07.2 PRECORDIAL PAIN: Primary | ICD-10-CM

## 2018-03-14 DIAGNOSIS — R53.83 FATIGUE, UNSPECIFIED TYPE: ICD-10-CM

## 2018-03-14 DIAGNOSIS — I25.10 CORONARY ARTERY DISEASE INVOLVING NATIVE CORONARY ARTERY OF NATIVE HEART WITHOUT ANGINA PECTORIS: ICD-10-CM

## 2018-03-14 PROCEDURE — 93000 ELECTROCARDIOGRAM COMPLETE: CPT | Performed by: PHYSICIAN ASSISTANT

## 2018-03-14 PROCEDURE — 99214 OFFICE O/P EST MOD 30 MIN: CPT | Performed by: PHYSICIAN ASSISTANT

## 2018-03-14 RX ORDER — FUROSEMIDE 40 MG/1
40 TABLET ORAL DAILY
Qty: 30 TABLET | Refills: 11 | Status: SHIPPED | OUTPATIENT
Start: 2018-03-14 | End: 2019-06-01 | Stop reason: SDUPTHER

## 2018-03-14 RX ORDER — AZELASTINE 1 MG/ML
SPRAY, METERED NASAL
COMMUNITY
Start: 2018-02-01 | End: 2020-05-08

## 2018-03-14 RX ORDER — FLUTICASONE PROPIONATE 50 MCG
SPRAY, SUSPENSION (ML) NASAL
COMMUNITY
Start: 2018-02-01 | End: 2018-05-17

## 2018-03-14 RX ORDER — POTASSIUM CHLORIDE 750 MG/1
10 TABLET, FILM COATED, EXTENDED RELEASE ORAL DAILY
Qty: 30 TABLET | Refills: 6 | Status: SHIPPED | OUTPATIENT
Start: 2018-03-14 | End: 2019-01-02 | Stop reason: SDUPTHER

## 2018-03-14 NOTE — PROGRESS NOTES
Problem list     Subjective   Aly Gabriel III is a 68 y.o. male     Chief Complaint   Patient presents with   • Coronary Artery Disease   • Palpitations   • Hyperlipidemia   • Hypertension   • Atrial Fibrillation   • Cerebrovascular Accident   • Carotid Artery Disease   • Shortness of Breath     Here for eval.       HPI    Problem list     1. Three-vessel coronary artery disease  1.1 cardiac catheterization May 2017 because of inferobasal, diaphragmatic, and distal lateral ischemia demonstrating severe three-vessel disease with recommendations for mechanical revascularization.   1.2 Coronary artery bypass grafting with LIMA to LAD, vein graft to OM, vein graft to PDA and posterolateral branch by Dr. Ronal Anthony June 2017   2. Preserved systolic function  2.1 Echo 8/7/17-mild LVH, EF 50-55%, diastolic dysfunction 2, basal inferoseptal, and apical septal wall segments are hypokinetic, mid inferoseptal wall segment is akinetic, mild MR, trace TR and AR, PA 30-35  3. 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 Hypertension  5. Dyslipidemia    Patient is a 68-year-old male that presents back for follow-up.  Patient was recently in the emergency room on March 1 with complaints of shortness of breath and chest pain.  It was noted that he had edema and small bilateral effusions on chest x-ray.  Cardiac workup was benign and he was discharged.    Patient's wife states that he has been experiencing occasional chest discomfort the patient cannot really characterize because of neurologic deficits from prior CVA.  He does have moderate levels of dyspnea.  He does not describe PND orthopnea.  He doesn't palpitate or have dysrhythmic symptoms.  He has mild lower  extremity edema and otherwise is doing well    Outpatient Encounter Prescriptions as of 3/14/2018   Medication Sig Dispense Refill   • aspirin 81 MG tablet Take 1 tablet by mouth Daily. 30 tablet 11   • atorvastatin (LIPITOR) 40 MG tablet Take 1 tablet by mouth Every Night. 30 tablet 11   • azelastine (ASTELIN) 0.1 % nasal spray      • docusate sodium (COLACE) 100 MG capsule Take 200 mg by mouth 2 (Two) Times a Day.     • fluticasone (FLONASE) 50 MCG/ACT nasal spray prn     • HYDROcodone-acetaminophen (NORCO) 5-325 MG per tablet Take 1 tablet by mouth Every 6 (Six) Hours As Needed for Moderate Pain (4-6).     • metoprolol tartrate (LOPRESSOR) 25 MG tablet Take 0.5 tablets by mouth Every 12 (Twelve) Hours. 30 tablet 11   • [DISCONTINUED] furosemide (LASIX) 20 MG tablet Take 1 tablet by mouth Daily As Needed (edema and/or 2 lb weight gain over night). 30 tablet 3   • [DISCONTINUED] hydrochlorothiazide (HYDRODIURIL) 25 MG tablet Take 1 tablet by mouth Daily. 30 tablet 11   • furosemide (LASIX) 40 MG tablet Take 1 tablet by mouth Daily. 30 tablet 11   • nitroglycerin (NITROSTAT) 0.4 MG SL tablet 1 under the tongue as needed for angina, may repeat q5mins for up three doses (Patient taking differently: Place 0.4 mg under the tongue Every 5 (Five) Minutes As Needed for Chest Pain. 1 under the tongue as needed for angina, may repeat q5mins for up three doses) 100 tablet 11   • potassium chloride (K-DUR) 10 MEQ CR tablet Take 1 tablet by mouth Daily. 30 tablet 6   • [DISCONTINUED] loratadine (CLARITIN) 10 MG tablet Take 1 tablet by mouth Daily. 30 tablet 5     Facility-Administered Encounter Medications as of 3/14/2018   Medication Dose Route Frequency Provider Last Rate Last Dose   • Chlorhexidine Gluconate Cloth 2 % pads 1 application  1 application Topical Q12H PRN JE Delgado           Review of patient's allergies indicates no known allergies.    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   • Wears glasses    • Wears partial dentures        Social History     Social History   • Marital status:      Spouse name: N/A   • Number of children: 0   • Years of education: N/A     Occupational History   • [employer] Retired     Social History Main Topics   • Smoking status: Former Smoker     Packs/day: 2.00     Years: 30.00     Types: Cigarettes     Quit date: 6/19/2014   • Smokeless tobacco: Never Used   • Alcohol use No   • Drug use: No   • Sexual activity: Yes     Partners: Female      Comment: spouse     Other Topics Concern   • Not on file     Social History Narrative   • No narrative on file       Family History   Problem Relation Age of Onset   • Heart disease Mother    • Heart disease Father        Review of Systems   Constitutional: Positive for diaphoresis and fatigue.   HENT: Positive for sore throat (chronic).         Pain in jaw   Eyes: Positive for visual disturbance (glasses).   Respiratory: Positive for shortness of breath.    Cardiovascular: Positive for chest pain and leg swelling.   Gastrointestinal: Negative.    Endocrine: Negative.    Genitourinary: Negative.    Musculoskeletal: Positive for arthralgias and myalgias.   Skin: Negative.    Allergic/Immunologic: Positive for environmental allergies.   Neurological: Positive for dizziness and light-headedness.   Hematological: Bruises/bleeds easily.   Psychiatric/Behavioral: Positive for sleep disturbance.       Objective   Vitals:    03/14/18 0923   BP: 130/87   BP Location: Left arm   Patient Position: Sitting   Pulse: 91   SpO2: 95%   Weight: 77.1 kg (170 lb)   Height: 172.7 cm (67.99\")      /87 (BP Location: Left arm, Patient Position: Sitting)   Pulse 91   Ht " "172.7 cm (67.99\")   Wt 77.1 kg (170 lb)   SpO2 95%   BMI 25.85 kg/m²     Lab Results (most recent)     None          Physical Exam   Constitutional: He is oriented to person, place, and time. He appears well-developed and well-nourished. No distress.   HENT:   Head: Normocephalic and atraumatic.   Eyes: EOM are normal. Pupils are equal, round, and reactive to light.   Neck: No JVD present.   Cardiovascular: Normal rate, regular rhythm, normal heart sounds and intact distal pulses.  Exam reveals no gallop and no friction rub.    No murmur heard.  Pulmonary/Chest: Effort normal and breath sounds normal. No respiratory distress. He has no wheezes. He has no rales. He exhibits no tenderness.   Musculoskeletal: Normal range of motion. He exhibits no edema.   Neurological: He is alert and oriented to person, place, and time. No cranial nerve deficit.   Skin: Skin is warm and dry. No rash noted. No erythema. No pallor.   Psychiatric: He has a normal mood and affect. His behavior is normal.   Nursing note and vitals reviewed.      Procedure     ECG 12 Lead  Date/Time: 3/14/2018 9:27 AM  Performed by: NEHEMIAH BURTON  Authorized by: NEHEMIAH BURTON   Comments: EKG demonstrates sinus rhythm at 95 bpm, RSR prime in V1, inferior wall MI age determined, no acute ST changes.  Artifact noted               Assessment/Plan     Problems Addressed this Visit        Cardiovascular and Mediastinum    Coronary artery disease involving native coronary artery of native heart without angina pectoris       Respiratory    Shortness of breath    Relevant Orders    ECG 12 Lead    Basic Metabolic Panel    Stress Test With Myocardial Perfusion One Day    Adult Transthoracic Echo Complete W/ Cont if Necessary Per Protocol    Chronic pulmonary edema    Relevant Medications    azelastine (ASTELIN) 0.1 % nasal spray    fluticasone (FLONASE) 50 MCG/ACT nasal spray       Nervous and Auditory    Precordial pain - Primary    Relevant Orders    ECG " 12 Lead    Basic Metabolic Panel    Stress Test With Myocardial Perfusion One Day    Adult Transthoracic Echo Complete W/ Cont if Necessary Per Protocol      Other Visit Diagnoses     Fatigue, unspecified type        Relevant Orders    ECG 12 Lead    Basic Metabolic Panel    Stress Test With Myocardial Perfusion One Day    Adult Transthoracic Echo Complete W/ Cont if Necessary Per Protocol              Recommendation  1.  Patient with complaints of chest pain and dyspnea with a significant improvement of symptoms post catheterization.  I feel ischemia should be ruled out and assessment of LV function again because of patient's shortness of breath, pulmonary edema and bilateral effusions on recent chest x-ray.  In that setting stress test and echocardiogram will be ordered.  2.  I would like to place him on 40 mg of Lasix daily.  He was not taking Lasix before.  I am stopping hydrochlorothiazide.  I putting him on a potassium supplement and we'll check a basic metabolic panel in one week.  3.  Otherwise we'll see him back for follow-up of above testing.  Follow-up primary as scheduled          Discussed the patient's BMI with him. BMI is within normal parameters. No follow-up required.       Electronically signed by:

## 2018-03-25 ENCOUNTER — APPOINTMENT (OUTPATIENT)
Dept: GENERAL RADIOLOGY | Facility: HOSPITAL | Age: 68
End: 2018-03-25

## 2018-03-25 ENCOUNTER — HOSPITAL ENCOUNTER (EMERGENCY)
Facility: HOSPITAL | Age: 68
Discharge: HOME | End: 2018-03-25
Attending: EMERGENCY MEDICINE | Admitting: EMERGENCY MEDICINE

## 2018-03-25 VITALS
BODY MASS INDEX: 25.9 KG/M2 | TEMPERATURE: 98.2 F | SYSTOLIC BLOOD PRESSURE: 109 MMHG | OXYGEN SATURATION: 98 % | WEIGHT: 165 LBS | HEART RATE: 77 BPM | RESPIRATION RATE: 17 BRPM | DIASTOLIC BLOOD PRESSURE: 72 MMHG | HEIGHT: 67 IN

## 2018-03-25 DIAGNOSIS — B96.89 ACUTE BACTERIAL BRONCHITIS: Primary | ICD-10-CM

## 2018-03-25 DIAGNOSIS — J20.8 ACUTE BACTERIAL BRONCHITIS: Primary | ICD-10-CM

## 2018-03-25 LAB
ALBUMIN SERPL-MCNC: 4.4 G/DL (ref 3.4–4.8)
ALBUMIN/GLOB SERPL: 1.4 G/DL (ref 1.5–2.5)
ALP SERPL-CCNC: 78 U/L (ref 40–129)
ALT SERPL W P-5'-P-CCNC: 21 U/L (ref 10–44)
ANION GAP SERPL CALCULATED.3IONS-SCNC: 5.2 MMOL/L (ref 3.6–11.2)
AST SERPL-CCNC: 13 U/L (ref 10–34)
BASOPHILS # BLD AUTO: 0.03 10*3/MM3 (ref 0–0.3)
BASOPHILS NFR BLD AUTO: 0.2 % (ref 0–2)
BILIRUB SERPL-MCNC: 0.3 MG/DL (ref 0.2–1.8)
BNP SERPL-MCNC: 74 PG/ML (ref 0–100)
BUN BLD-MCNC: 25 MG/DL (ref 7–21)
BUN/CREAT SERPL: 18.9 (ref 7–25)
CALCIUM SPEC-SCNC: 9.4 MG/DL (ref 7.7–10)
CHLORIDE SERPL-SCNC: 99 MMOL/L (ref 99–112)
CK MB SERPL-CCNC: 2.36 NG/ML (ref 0–5)
CK MB SERPL-RTO: 8.4 % (ref 0–3)
CK SERPL-CCNC: 28 U/L (ref 24–204)
CO2 SERPL-SCNC: 28.8 MMOL/L (ref 24.3–31.9)
CREAT BLD-MCNC: 1.32 MG/DL (ref 0.43–1.29)
DEPRECATED RDW RBC AUTO: 44.3 FL (ref 37–54)
EOSINOPHIL # BLD AUTO: 0.39 10*3/MM3 (ref 0–0.7)
EOSINOPHIL NFR BLD AUTO: 2.3 % (ref 0–7)
ERYTHROCYTE [DISTWIDTH] IN BLOOD BY AUTOMATED COUNT: 12.9 % (ref 11.5–14.5)
GFR SERPL CREATININE-BSD FRML MDRD: 54 ML/MIN/1.73
GLOBULIN UR ELPH-MCNC: 3.1 GM/DL
GLUCOSE BLD-MCNC: 92 MG/DL (ref 70–110)
HCT VFR BLD AUTO: 47.6 % (ref 42–52)
HGB BLD-MCNC: 15.9 G/DL (ref 14–18)
HOLD SPECIMEN: NORMAL
HOLD SPECIMEN: NORMAL
IMM GRANULOCYTES # BLD: 0.19 10*3/MM3 (ref 0–0.03)
IMM GRANULOCYTES NFR BLD: 1.1 % (ref 0–0.5)
LYMPHOCYTES # BLD AUTO: 5.13 10*3/MM3 (ref 1–3)
LYMPHOCYTES NFR BLD AUTO: 30.8 % (ref 16–46)
MCH RBC QN AUTO: 32.2 PG (ref 27–33)
MCHC RBC AUTO-ENTMCNC: 33.4 G/DL (ref 33–37)
MCV RBC AUTO: 96.4 FL (ref 80–94)
MONOCYTES # BLD AUTO: 1.65 10*3/MM3 (ref 0.1–0.9)
MONOCYTES NFR BLD AUTO: 9.9 % (ref 0–12)
NEUTROPHILS # BLD AUTO: 9.29 10*3/MM3 (ref 1.4–6.5)
NEUTROPHILS NFR BLD AUTO: 55.7 % (ref 40–75)
OSMOLALITY SERPL CALC.SUM OF ELEC: 270.4 MOSM/KG (ref 273–305)
PLATELET # BLD AUTO: 368 10*3/MM3 (ref 130–400)
PMV BLD AUTO: 9.4 FL (ref 6–10)
POTASSIUM BLD-SCNC: 4 MMOL/L (ref 3.5–5.3)
PROT SERPL-MCNC: 7.5 G/DL (ref 6–8)
RBC # BLD AUTO: 4.94 10*6/MM3 (ref 4.7–6.1)
SODIUM BLD-SCNC: 133 MMOL/L (ref 135–153)
TROPONIN I SERPL-MCNC: <0.006 NG/ML
WBC NRBC COR # BLD: 16.68 10*3/MM3 (ref 4.5–12.5)
WHOLE BLOOD HOLD SPECIMEN: NORMAL
WHOLE BLOOD HOLD SPECIMEN: NORMAL

## 2018-03-25 PROCEDURE — 85025 COMPLETE CBC W/AUTO DIFF WBC: CPT | Performed by: EMERGENCY MEDICINE

## 2018-03-25 PROCEDURE — 93005 ELECTROCARDIOGRAM TRACING: CPT | Performed by: EMERGENCY MEDICINE

## 2018-03-25 PROCEDURE — 83880 ASSAY OF NATRIURETIC PEPTIDE: CPT | Performed by: EMERGENCY MEDICINE

## 2018-03-25 PROCEDURE — 80053 COMPREHEN METABOLIC PANEL: CPT | Performed by: EMERGENCY MEDICINE

## 2018-03-25 PROCEDURE — 82553 CREATINE MB FRACTION: CPT | Performed by: EMERGENCY MEDICINE

## 2018-03-25 PROCEDURE — 71045 X-RAY EXAM CHEST 1 VIEW: CPT | Performed by: RADIOLOGY

## 2018-03-25 PROCEDURE — 84484 ASSAY OF TROPONIN QUANT: CPT | Performed by: EMERGENCY MEDICINE

## 2018-03-25 PROCEDURE — 71045 X-RAY EXAM CHEST 1 VIEW: CPT

## 2018-03-25 PROCEDURE — 99284 EMERGENCY DEPT VISIT MOD MDM: CPT

## 2018-03-25 PROCEDURE — 96365 THER/PROPH/DIAG IV INF INIT: CPT

## 2018-03-25 PROCEDURE — 25010000002 CEFTRIAXONE: Performed by: EMERGENCY MEDICINE

## 2018-03-25 PROCEDURE — 82550 ASSAY OF CK (CPK): CPT | Performed by: EMERGENCY MEDICINE

## 2018-03-25 RX ORDER — ASPIRIN 325 MG
325 TABLET ORAL ONCE
Status: COMPLETED | OUTPATIENT
Start: 2018-03-25 | End: 2018-03-25

## 2018-03-25 RX ORDER — SODIUM CHLORIDE 0.9 % (FLUSH) 0.9 %
10 SYRINGE (ML) INJECTION AS NEEDED
Status: DISCONTINUED | OUTPATIENT
Start: 2018-03-25 | End: 2018-03-25 | Stop reason: HOSPADM

## 2018-03-25 RX ORDER — LEVOFLOXACIN 750 MG/1
750 TABLET ORAL DAILY
Qty: 8 TABLET | Refills: 0 | Status: SHIPPED | OUTPATIENT
Start: 2018-03-25 | End: 2018-04-11

## 2018-03-25 RX ADMIN — CEFTRIAXONE 1 G: 1 INJECTION, POWDER, FOR SOLUTION INTRAMUSCULAR; INTRAVENOUS at 15:27

## 2018-03-25 RX ADMIN — ASPIRIN 325 MG: 325 TABLET ORAL at 14:00

## 2018-03-25 NOTE — ED PROVIDER NOTES
Subjective     Shortness of Breath   Severity:  Mild  Onset quality:  Gradual  Duration:  3 days  Progression:  Worsening  Chronicity:  New  Context: activity    Relieved by:  Diuretics  Worsened by:  Exertion, movement and coughing  Ineffective treatments:  None tried  Associated symptoms: cough, sputum production and wheezing        Review of Systems   Constitutional: Positive for activity change.   HENT: Positive for congestion.    Eyes: Negative.    Respiratory: Positive for cough, sputum production, shortness of breath and wheezing.    Cardiovascular: Negative.    Gastrointestinal: Negative.    Endocrine: Negative.    Genitourinary: Negative.    Musculoskeletal: Negative.    Skin: Negative.    Allergic/Immunologic: Negative.    Neurological: Negative.    Hematological: Negative.    Psychiatric/Behavioral: Negative.    All other systems reviewed and are negative.      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   • Wears glasses    • Wears partial dentures        No Known Allergies    Past Surgical History:   Procedure Laterality Date   • ANKLE SURGERY     • CAROTID ARTERY ANGIOPLASTY     • CAROTID ENDARTERECTOMY Right 2014   • CAROTID ENDARTERECTOMY     • CORONARY ARTERY BYPASS GRAFT N/A 6/28/2017    Procedure: MEDIAN STERNOTOMY, CORONARY ARTERY BYPASS graft X 4  UTILIZING THE LEFT INTERNAL MAMMARY ARTERY  AND EVH OF THE LEFT GREATER SAPHENOUS VEIN EXPLORATION OF THE RIGHT GREATER SAPHENOUS VEIN ;  Surgeon: Ronal Anthony MD;  Location: [facility];  Service:        Family History   Problem Relation Age of Onset   • Heart disease Mother    • Heart disease Father        Social History     Social History   • Marital  status:    • Number of children: 0     Occupational History   • GM Retired     Social History Main Topics   • Smoking status: Former Smoker     Packs/day: 2.00     Years: 30.00     Types: Cigarettes     Quit date: 6/19/2014   • Smokeless tobacco: Never Used   • Alcohol use No   • Drug use: No   • Sexual activity: Yes     Partners: Female      Comment: spouse     Other Topics Concern   • Not on file           Objective   Physical Exam   Constitutional: He appears well-developed and well-nourished. He appears distressed.   HENT:   Head: Normocephalic and atraumatic.   Eyes: EOM are normal. Pupils are equal, round, and reactive to light.   Neck: Normal range of motion. Neck supple.   Cardiovascular: Normal rate and normal heart sounds.    Pulmonary/Chest: He has wheezes. He has rales.   Wheezes and rales bilat   Abdominal: Soft. Bowel sounds are normal.   Genitourinary: Penis normal.   Musculoskeletal: Normal range of motion.   Neurological: He is alert.   Skin: Skin is warm. He is diaphoretic.   Psychiatric: He has a normal mood and affect. His behavior is normal.   Nursing note and vitals reviewed.      Procedures         ED Course  ED Course                  MDM  Number of Diagnoses or Management Options  Acute bacterial bronchitis:      Amount and/or Complexity of Data Reviewed  Clinical lab tests: ordered and reviewed  Tests in the radiology section of CPT®: ordered  Tests in the medicine section of CPT®: ordered and reviewed    Risk of Complications, Morbidity, and/or Mortality  Presenting problems: moderate  Diagnostic procedures: moderate  Management options: moderate    Patient Progress  Patient progress: stable      Final diagnoses:   Acute bacterial bronchitis            Taqueria Acharya MD  03/25/18 1526       Taqueria Acharya MD  03/25/18 1530       Taqueria Acharya MD  03/25/18 1532

## 2018-03-29 ENCOUNTER — TELEPHONE (OUTPATIENT)
Dept: CARDIOLOGY | Facility: CLINIC | Age: 68
End: 2018-03-29

## 2018-03-29 NOTE — TELEPHONE ENCOUNTER
PATIENT WAS WANTING TO SEE IF WE COULD GET STRESS TEST AND ECHO SCHEDULED SOONER. SHE HAD TAKEN HIM TO [facility]. AND THEY DID NOT DO ANYTHING DIFFERENT, BUT THAT THEY STATED HIS FLUID WAS GONE. SHE IS AWARE WE ARE WAY BOOKED OUT FOR STRESS TEST AND ECHO, AND TO CALL THE OFFICE BACK FIRST OF THE WEEK IF ANYTHING CHANGES. STATED SHE WOULD. MALA MATUTE        ----- Message from Latasha Clark LPN sent at 3/29/2018  1:38 PM EDT -----   Wife is requesting to speak with someone re: upcoming scheduled testing.

## 2018-04-05 ENCOUNTER — OUTSIDE FACILITY SERVICE (OUTPATIENT)
Dept: CARDIOLOGY | Facility: CLINIC | Age: 68
End: 2018-04-05

## 2018-04-05 ENCOUNTER — HOSPITAL ENCOUNTER (OUTPATIENT)
Dept: CARDIOLOGY | Facility: HOSPITAL | Age: 68
Discharge: HOME | End: 2018-04-05

## 2018-04-05 LAB
MAXIMAL PREDICTED HEART RATE: 151 BPM
MAXIMAL PREDICTED HEART RATE: 151 BPM
STRESS TARGET HR: 128 BPM
STRESS TARGET HR: 128 BPM

## 2018-04-05 PROCEDURE — 93306 TTE W/DOPPLER COMPLETE: CPT

## 2018-04-05 PROCEDURE — 78452 HT MUSCLE IMAGE SPECT MULT: CPT | Performed by: INTERNAL MEDICINE

## 2018-04-05 PROCEDURE — 0 TECHNETIUM SESTAMIBI: Performed by: INTERNAL MEDICINE

## 2018-04-05 PROCEDURE — 93018 CV STRESS TEST I&R ONLY: CPT | Performed by: INTERNAL MEDICINE

## 2018-04-05 PROCEDURE — 93306 TTE W/DOPPLER COMPLETE: CPT | Performed by: INTERNAL MEDICINE

## 2018-04-05 PROCEDURE — 78452 HT MUSCLE IMAGE SPECT MULT: CPT

## 2018-04-05 PROCEDURE — A9500 TC99M SESTAMIBI: HCPCS | Performed by: INTERNAL MEDICINE

## 2018-04-05 PROCEDURE — 25010000002 REGADENOSON 0.4 MG/5ML SOLUTION: Performed by: INTERNAL MEDICINE

## 2018-04-05 PROCEDURE — 93017 CV STRESS TEST TRACING ONLY: CPT

## 2018-04-05 RX ADMIN — TECHNETIUM TC 99M SESTAMIBI 1 DOSE: 1 INJECTION INTRAVENOUS at 09:00

## 2018-04-05 RX ADMIN — REGADENOSON 0.4 MG: 0.08 INJECTION, SOLUTION INTRAVENOUS at 09:00

## 2018-04-10 ENCOUNTER — TELEPHONE (OUTPATIENT)
Dept: CARDIOLOGY | Facility: CLINIC | Age: 68
End: 2018-04-10

## 2018-04-10 NOTE — TELEPHONE ENCOUNTER
L/M  NUMBER THAT TESTING IS NOT BACK YET, AND THAT NURSING STAFF WILL CALL THEM WITH RESULTS WHEN AVAIL. MALA MATUTE        ----- Message from Dottie Olivares MA sent at 4/10/2018  4:31 PM EDT -----  Contact: GAUTAM GALICIA      ----- Message -----  From: Chito Hillman  Sent: 4/10/2018   4:17 PM  To: Mgjustina Terrell [facility]    PT WIFE IS REQUESTING A NURSE TO CALL  HER  IF 'S STRESS AND ECHO  RESULTS ARE AVAILABLE.

## 2018-04-11 ENCOUNTER — OFFICE VISIT (OUTPATIENT)
Dept: CARDIOLOGY | Facility: CLINIC | Age: 68
End: 2018-04-11

## 2018-04-11 ENCOUNTER — TELEPHONE (OUTPATIENT)
Dept: CARDIOLOGY | Facility: CLINIC | Age: 68
End: 2018-04-11

## 2018-04-11 ENCOUNTER — DOCUMENTATION (OUTPATIENT)
Dept: CARDIOLOGY | Facility: CLINIC | Age: 68
End: 2018-04-11

## 2018-04-11 VITALS
DIASTOLIC BLOOD PRESSURE: 83 MMHG | HEIGHT: 67 IN | HEART RATE: 83 BPM | SYSTOLIC BLOOD PRESSURE: 147 MMHG | OXYGEN SATURATION: 97 % | WEIGHT: 170.4 LBS | BODY MASS INDEX: 26.74 KG/M2

## 2018-04-11 DIAGNOSIS — I25.10 CORONARY ARTERY DISEASE INVOLVING NATIVE CORONARY ARTERY OF NATIVE HEART WITHOUT ANGINA PECTORIS: ICD-10-CM

## 2018-04-11 DIAGNOSIS — R53.83 FATIGUE, UNSPECIFIED TYPE: ICD-10-CM

## 2018-04-11 DIAGNOSIS — I10 ESSENTIAL HYPERTENSION: Primary | ICD-10-CM

## 2018-04-11 DIAGNOSIS — R06.02 SHORTNESS OF BREATH: ICD-10-CM

## 2018-04-11 DIAGNOSIS — R07.2 PRECORDIAL PAIN: Primary | ICD-10-CM

## 2018-04-11 PROCEDURE — 93000 ELECTROCARDIOGRAM COMPLETE: CPT | Performed by: PHYSICIAN ASSISTANT

## 2018-04-11 PROCEDURE — 99214 OFFICE O/P EST MOD 30 MIN: CPT | Performed by: PHYSICIAN ASSISTANT

## 2018-04-11 RX ORDER — CLOPIDOGREL BISULFATE 75 MG/1
75 TABLET ORAL DAILY
Qty: 30 TABLET | Refills: 11 | Status: SHIPPED | OUTPATIENT
Start: 2018-04-11 | End: 2019-04-13 | Stop reason: SDUPTHER

## 2018-04-11 RX ORDER — ISOSORBIDE MONONITRATE 30 MG/1
30 TABLET, EXTENDED RELEASE ORAL EVERY MORNING
Qty: 30 TABLET | Refills: 11 | Status: SHIPPED | OUTPATIENT
Start: 2018-04-11 | End: 2018-05-17 | Stop reason: ALTCHOICE

## 2018-04-11 RX ORDER — PANTOPRAZOLE SODIUM 40 MG/1
40 TABLET, DELAYED RELEASE ORAL DAILY
Qty: 30 TABLET | Refills: 6 | Status: SHIPPED | OUTPATIENT
Start: 2018-04-11 | End: 2022-01-01 | Stop reason: ALTCHOICE

## 2018-04-11 NOTE — TELEPHONE ENCOUNTER
"PER NEHEMIAH BURTON, PAC HAVE PATIENT COME FOR AN APPT. TODAY OR AMA. CALLED AND RELAYED TO WIFE, COMING ON. MALA MATUTE          ----- Message from Tiera Garcias sent at 4/11/2018  1:33 PM EDT -----  Contact: GAUTAM (WIFE)  THE PATIENTS WIFE STATES HER  IS \"BAD AGAIN\".  SHE STATES IT IS THE SAME THING HE HAD BEFORE.  SHE WOULD LIKE TO SPEAK TO SOMEONE ABOUT WHAT SHE NEEDS TO DO BETWEEN NOW AND THE TIME HE GETS HIS TEST RESULTS BACK.  SHE STATES HE IS GRABBING HIS CHEST AND FEELS LIKE SOMETHING IS SHOCKING HIM.   SHE STATES HE IS VERY SOA AND CAN NOT WALK WITHOUT TAKING A BREAK.  SHE STATES SHE HAS HAD HIM TO THE ER 2 TIMES WITHIN THE LAST 3 WEEKS.  SHE CAN BE REACHED [phone].    "

## 2018-04-11 NOTE — PROGRESS NOTES
STRESS TEST AND ECHO RESULTS BACK IN THE OFFICE. PATIENT SEEN HERE IN THE OFFICE TODAY AND [facility] SCHEDULED. PH,LPN

## 2018-04-11 NOTE — TELEPHONE ENCOUNTER
Patient's wife presents to office requesting Metoprolol be increased. She states his BP has gradually been increasing. Per Delta Shah PA-C patient to increase Metoprolol to 25 mg BID from 0.5 tab BID. Patient's wife aware. Will continue to monitor BP and call back for any problems.

## 2018-04-11 NOTE — PROGRESS NOTES
Problem list     Subjective   Aly Gabriel III is a 68 y.o. male     Chief Complaint   Patient presents with   • Coronary Artery Disease   • Palpitations   • Hyperlipidemia   • Hypertension   • Atrial Fibrillation   • Chest Pain     Here for eval.       HPI         Problem list     1. Three-vessel coronary artery disease  1.1 cardiac catheterization May 2017 because of inferobasal, diaphragmatic, and distal lateral ischemia demonstrating severe three-vessel disease with recommendations for mechanical revascularization.   1.2 Coronary artery bypass grafting with LIMA to LAD, vein graft to OM, vein graft to PDA and posterolateral branch by Dr. Ronal Anthony June 2017   1.3 stress test April 2018 demonstrates no evidence of ischemia and preserved LV function  2. Preserved systolic function  2.1 Echo 8/7/17-mild LVH, EF 50-55%, diastolic dysfunction 2, basal inferoseptal, and apical septal wall segments are hypokinetic, mid inferoseptal wall segment is akinetic, mild MR, trace TR and AR, PA 30-35  3. 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 Hypertension  5. Dyslipidemia history.   6.  Mild mitral and tricuspid regurgitation    Patient is a 68-year-old male that presents back for follow-up.  He had stress test and echocardiogram performed.  Patient has had progressive chest discomfort.  Wife accompanies him who helps with history.  His symptoms as similar to what he felt previously bypass.  He has significant chest discomfort.  He describes substernal pressure and heaviness that occurs at rest and with exertion.  Chest pain is quite significant Per report.  Patient has had significant dyspnea.  Wife has noticed that he cannot walk as far because of  shortness of breath.  No PND or orthopnea.  Does not admit to palpitations, dizziness, presyncope or syncope and otherwise feels well         Outpatient Encounter Prescriptions as of 4/11/2018   Medication Sig Dispense Refill   • aspirin 81 MG tablet Take 1 tablet by mouth Daily. 30 tablet 11   • atorvastatin (LIPITOR) 40 MG tablet Take 1 tablet by mouth Every Night. 30 tablet 11   • azelastine (ASTELIN) 0.1 % nasal spray      • docusate sodium (COLACE) 100 MG capsule Take 200 mg by mouth 2 (Two) Times a Day.     • fluticasone (FLONASE) 50 MCG/ACT nasal spray prn     • furosemide (LASIX) 40 MG tablet Take 1 tablet by mouth Daily. 30 tablet 11   • HYDROcodone-acetaminophen (NORCO) 5-325 MG per tablet Take 1 tablet by mouth Every 6 (Six) Hours As Needed for Moderate Pain (4-6).     • metoprolol tartrate (LOPRESSOR) 25 MG tablet Take 1 tablet by mouth 2 (Two) Times a Day. 60 tablet 6   • nitroglycerin (NITROSTAT) 0.4 MG SL tablet 1 under the tongue as needed for angina, may repeat q5mins for up three doses (Patient taking differently: Place 0.4 mg under the tongue Every 5 (Five) Minutes As Needed for Chest Pain. 1 under the tongue as needed for angina, may repeat q5mins for up three doses) 100 tablet 11   • potassium chloride (K-DUR) 10 MEQ CR tablet Take 1 tablet by mouth Daily. 30 tablet 6   • clopidogrel (PLAVIX) 75 MG tablet Take 1 tablet by mouth Daily. 30 tablet 11   • isosorbide mononitrate (IMDUR) 30 MG 24 hr tablet Take 1 tablet by mouth Every Morning. 30 tablet 11   • pantoprazole (PROTONIX) 40 MG EC tablet Take 1 tablet by mouth Daily. 30 tablet 6   • [DISCONTINUED] levoFLOXacin (LEVAQUIN) 750 MG tablet Take 1 tablet by mouth Daily. 8 tablet 0   • [DISCONTINUED] metoprolol tartrate (LOPRESSOR) 25 MG tablet Take 0.5 tablets by mouth Every 12 (Twelve) Hours. 30 tablet 11     Facility-Administered Encounter Medications as of 4/11/2018   Medication Dose Route Frequency Provider Last Rate Last Dose   •  Chlorhexidine Gluconate Cloth 2 % pads 1 application  1 application Topical Q12H PRN JE Delgado           Review of patient's allergies indicates no known allergies.    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   • Wears glasses    • Wears partial dentures        Social History     Social History   • Marital status:      Spouse name: N/A   • Number of children: 0   • Years of education: N/A     Occupational History   • [employer] Retired     Social History Main Topics   • Smoking status: Former Smoker     Packs/day: 2.00     Years: 30.00     Types: Cigarettes     Quit date: 6/19/2014   • Smokeless tobacco: Never Used   • Alcohol use No   • Drug use: No   • Sexual activity: Yes     Partners: Female      Comment: spouse     Other Topics Concern   • Not on file     Social History Narrative   • No narrative on file       Family History   Problem Relation Age of Onset   • Heart disease Mother    • Heart disease Father        Review of Systems   Constitutional: Positive for fatigue.   HENT:        Sinus issues   Eyes: Positive for visual disturbance (glasses).   Respiratory: Positive for shortness of breath.    Cardiovascular: Positive for chest pain and leg swelling. Negative for palpitations.   Gastrointestinal: Positive for abdominal pain.   Endocrine: Negative.    Genitourinary: Negative.    Musculoskeletal: Negative.    Skin: Negative.    Allergic/Immunologic: Negative.    Neurological: Positive for dizziness and light-headedness.   Hematological: Bruises/bleeds easily.   Psychiatric/Behavioral: Positive for sleep disturbance.       Objective   Vitals:    04/11/18 1429   BP: 147/83   BP Location: Left arm   Patient  "Position: Sitting   Pulse: 83   SpO2: 97%   Weight: 77.3 kg (170 lb 6.4 oz)   Height: 170.2 cm (67.01\")      /83 (BP Location: Left arm, Patient Position: Sitting)   Pulse 83   Ht 170.2 cm (67.01\")   Wt 77.3 kg (170 lb 6.4 oz)   SpO2 97%   BMI 26.68 kg/m²     Lab Results (most recent)     None          Physical Exam   Constitutional: He is oriented to person, place, and time. He appears well-developed and well-nourished. No distress.   HENT:   Head: Normocephalic and atraumatic.   Eyes: EOM are normal. Pupils are equal, round, and reactive to light.   Neck: No JVD present.   Cardiovascular: Normal rate, regular rhythm, normal heart sounds and intact distal pulses.  Exam reveals no gallop and no friction rub.    No murmur heard.  Pulmonary/Chest: Effort normal and breath sounds normal. No respiratory distress. He has no wheezes. He has no rales. He exhibits no tenderness.   Musculoskeletal: Normal range of motion. He exhibits no edema.   Neurological: He is alert and oriented to person, place, and time. No cranial nerve deficit.   Skin: Skin is warm and dry. No rash noted. No erythema. No pallor.   Psychiatric: He has a normal mood and affect. His behavior is normal.   Nursing note and vitals reviewed.      Procedure     ECG 12 Lead  Date/Time: 4/11/2018 2:32 PM  Performed by: NEHEMIAH BURTON  Authorized by: NEHEMIAH BURTON   Comments: EKG demonstrates sinus with IWMI age undetermined, ST depression baseline in anterolateral leads               Assessment/Plan     Problems Addressed this Visit        Cardiovascular and Mediastinum    Coronary artery disease involving native coronary artery of native heart without angina pectoris    Relevant Medications    isosorbide mononitrate (IMDUR) 30 MG 24 hr tablet    clopidogrel (PLAVIX) 75 MG tablet    Other Relevant Orders    Cardiac catheterization    D-dimer, Quantitative       Respiratory    Shortness of breath    Relevant Orders    ECG 12 Lead    Cardiac " catheterization    D-dimer, Quantitative       Nervous and Auditory    Precordial pain - Primary    Relevant Orders    ECG 12 Lead    Cardiac catheterization    D-dimer, Quantitative       Other    Fatigue    Relevant Orders    ECG 12 Lead    Cardiac catheterization    D-dimer, Quantitative      Other Visit Diagnoses    None.           Recommendation   There has been some changes on the EKG compared to last with ST depression in the precordial leads.  I talked with family and patient who describes having mild discomfort at this time.  I recommended ER evaluation because of concern of him.  They have already been to the emergency room several times and despite our recommendation is aware of the risk of MI, she will take him to the emergency room if symptoms worsen.  1.  Patient experiencing intermittent episodes of chest pain with multiple ER visits.  Stress test demonstrated no evidence of ischemia.  He has failed antianginal therapy.  Patient initially had chest pain free interval after bypass and has done well.  Therefore, was a progressive low-level symptoms despite medical management, he'll be scheduled for cardiac catheterization.   We will see him back for follow-up after above testing.  Follow-up with primary as scheduled            Patient's Body mass index is 26.68 kg/m². BMI is within normal parameters. No follow-up required.       Electronically signed by:

## 2018-04-17 ENCOUNTER — TELEPHONE (OUTPATIENT)
Dept: CARDIOLOGY | Facility: CLINIC | Age: 68
End: 2018-04-17

## 2018-04-17 NOTE — TELEPHONE ENCOUNTER
WIFE AWARE OF ECHO RESULTS. MALA MATUTE        ----- Message from Tiera Garcias sent at 4/17/2018  4:02 PM EDT -----  Contact: GAUTAM (WIFE)  THE PATIENTS WIFE CALLED AND WANTED TO GET THE RESULTS OF HER HUSBANDS ECHO.  SHE CAN BE REACHED [phone].  THANKS

## 2018-05-03 ENCOUNTER — OUTSIDE FACILITY SERVICE (OUTPATIENT)
Dept: CARDIOLOGY | Facility: CLINIC | Age: 68
End: 2018-05-03

## 2018-05-03 PROCEDURE — 92937 PRQ TRLUML REVSC CAB GRF 1: CPT | Performed by: INTERNAL MEDICINE

## 2018-05-03 PROCEDURE — 93459 L HRT ART/GRFT ANGIO: CPT | Performed by: INTERNAL MEDICINE

## 2018-05-03 PROCEDURE — 92978 ENDOLUMINL IVUS OCT C 1ST: CPT | Performed by: INTERNAL MEDICINE

## 2018-05-04 ENCOUNTER — OUTSIDE FACILITY SERVICE (OUTPATIENT)
Dept: CARDIOLOGY | Facility: CLINIC | Age: 68
End: 2018-05-04

## 2018-05-09 ENCOUNTER — OFFICE VISIT (OUTPATIENT)
Dept: CARDIOLOGY | Facility: CLINIC | Age: 68
End: 2018-05-09

## 2018-05-09 VITALS
HEART RATE: 92 BPM | DIASTOLIC BLOOD PRESSURE: 77 MMHG | SYSTOLIC BLOOD PRESSURE: 120 MMHG | BODY MASS INDEX: 26.87 KG/M2 | WEIGHT: 171.2 LBS | HEIGHT: 67 IN | OXYGEN SATURATION: 95 %

## 2018-05-09 DIAGNOSIS — R06.02 SHORTNESS OF BREATH: ICD-10-CM

## 2018-05-09 DIAGNOSIS — I10 ESSENTIAL HYPERTENSION: ICD-10-CM

## 2018-05-09 DIAGNOSIS — I25.10 CORONARY ARTERY DISEASE INVOLVING NATIVE CORONARY ARTERY OF NATIVE HEART WITHOUT ANGINA PECTORIS: Primary | ICD-10-CM

## 2018-05-09 PROCEDURE — 99213 OFFICE O/P EST LOW 20 MIN: CPT | Performed by: PHYSICIAN ASSISTANT

## 2018-05-09 RX ORDER — METOCLOPRAMIDE 10 MG/1
TABLET ORAL DAILY
COMMUNITY
Start: 2018-04-18 | End: 2022-01-01 | Stop reason: ALTCHOICE

## 2018-05-09 RX ORDER — AZITHROMYCIN 250 MG/1
TABLET, FILM COATED ORAL
Qty: 6 TABLET | Refills: 0 | Status: SHIPPED | OUTPATIENT
Start: 2018-05-09 | End: 2018-05-17

## 2018-05-09 NOTE — PROGRESS NOTES
Problem list     Subjective   Aly Gabriel III is a 68 y.o. male     Chief Complaint   Patient presents with   • Coronary Artery Disease     Here for heart cath. f/u   • Hyperlipidemia   • Hypertension   • Atrial Fibrillation       HPI    Problem list     1. Three-vessel coronary artery disease  1.1 cardiac catheterization May 2017 because of inferobasal, diaphragmatic, and distal lateral ischemia demonstrating severe three-vessel disease with recommendations for mechanical revascularization.   1.2 Coronary artery bypass grafting with LIMA to LAD, vein graft to OM, vein graft to PDA and posterolateral branch by Dr. Ronal Anthony June 2017   1.3 stress test April 2018 demonstrates no evidence of ischemia and preserved LV function  1.4 cardiac catheterization May 2018 with patent LIMA and vein graft to circumflex with 90% disease in the vein graft to the RCA.  Stenting performed and nonobstructive disease otherwise  2. Preserved systolic function  2.1 Echo 8/7/17-mild LVH, EF 50-55%, diastolic dysfunction 2, basal inferoseptal, and apical septal wall segments are hypokinetic, mid inferoseptal wall segment is akinetic, mild MR, trace TR and AR, PA 30-35  3. 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 Hypertension  5. Dyslipidemia history.   6.  Mild mitral and tricuspid regurgitation    Patient is a 68-year-old male that presents back for follow-up.  He feels remarkably improved.  He has no chest pain or pressure.  That is completely resolves and stenting procedure.  He has improvement of his shortness of breath which is very mild at baseline.  No PND orthopnea.  Patient has no palpitations or dysrhythmic symptoms and otherwise is feeling remarkably  improved      Outpatient Encounter Prescriptions as of 5/9/2018   Medication Sig Dispense Refill   • aspirin 81 MG tablet Take 1 tablet by mouth Daily. 30 tablet 11   • atorvastatin (LIPITOR) 40 MG tablet Take 1 tablet by mouth Every Night. 30 tablet 11   • azelastine (ASTELIN) 0.1 % nasal spray      • clopidogrel (PLAVIX) 75 MG tablet Take 1 tablet by mouth Daily. 30 tablet 11   • docusate sodium (COLACE) 100 MG capsule Take 200 mg by mouth 2 (Two) Times a Day.     • fluticasone (FLONASE) 50 MCG/ACT nasal spray prn     • furosemide (LASIX) 40 MG tablet Take 1 tablet by mouth Daily. 30 tablet 11   • HYDROcodone-acetaminophen (NORCO) 5-325 MG per tablet Take 1 tablet by mouth Every 6 (Six) Hours As Needed for Moderate Pain (4-6).     • isosorbide mononitrate (IMDUR) 30 MG 24 hr tablet Take 1 tablet by mouth Every Morning. 30 tablet 11   • metoclopramide (REGLAN) 10 MG tablet Daily.     • metoprolol tartrate (LOPRESSOR) 25 MG tablet Take 1 tablet by mouth 2 (Two) Times a Day. 60 tablet 6   • pantoprazole (PROTONIX) 40 MG EC tablet Take 1 tablet by mouth Daily. 30 tablet 6   • potassium chloride (K-DUR) 10 MEQ CR tablet Take 1 tablet by mouth Daily. 30 tablet 6   • azithromycin (ZITHROMAX Z-JOHN) 250 MG tablet Take 2 tablets the first day, then 1 tablet daily for 4 days. 6 tablet 0   • nitroglycerin (NITROSTAT) 0.4 MG SL tablet 1 under the tongue as needed for angina, may repeat q5mins for up three doses (Patient taking differently: Place 0.4 mg under the tongue Every 5 (Five) Minutes As Needed for Chest Pain. 1 under the tongue as needed for angina, may repeat q5mins for up three doses) 100 tablet 11     Facility-Administered Encounter Medications as of 5/9/2018   Medication Dose Route Frequency Provider Last Rate Last Dose   • Chlorhexidine Gluconate Cloth 2 % pads 1 application  1 application Topical Q12H PRN JE Delgado           Review of patient's allergies indicates no known allergies.    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   • Wears glasses    • Wears partial dentures        Social History     Social History   • Marital status:      Spouse name: N/A   • Number of children: 0   • Years of education: N/A     Occupational History   • GM Retired     Social History Main Topics   • Smoking status: Former Smoker     Packs/day: 2.00     Years: 30.00     Types: Cigarettes     Quit date: 6/19/2014   • Smokeless tobacco: Never Used   • Alcohol use No   • Drug use: No   • Sexual activity: Yes     Partners: Female      Comment: spouse     Other Topics Concern   • Not on file     Social History Narrative   • No narrative on file       Family History   Problem Relation Age of Onset   • Heart disease Mother    • Heart disease Father        Review of Systems   Constitutional: Negative.    HENT: Positive for sore throat (chronic).         Sinus issues  All followed by ENT   Eyes: Positive for visual disturbance (glasses).   Respiratory: Positive for shortness of breath.    Cardiovascular: Positive for leg swelling (mildly). Negative for chest pain and palpitations.   Gastrointestinal: Negative.    Endocrine: Negative.    Genitourinary: Negative.    Musculoskeletal: Positive for arthralgias, myalgias and neck pain.   Skin: Negative.    Allergic/Immunologic: Positive for environmental allergies.   Neurological: Negative.    Hematological: Bruises/bleeds easily (bruise).   Psychiatric/Behavioral: Negative.        Objective   Vitals:    05/09/18 0839   BP: 120/77   BP Location: Left arm   Patient Position: Sitting   Pulse: 92   SpO2: 95%   Weight: 77.7 kg (171 lb 3.2 oz)   Height: 170.2 cm (67.01\")      /77 (BP Location: " "Left arm, Patient Position: Sitting)   Pulse 92   Ht 170.2 cm (67.01\")   Wt 77.7 kg (171 lb 3.2 oz)   SpO2 95%   BMI 26.81 kg/m²     Lab Results (most recent)     None          Physical Exam   Constitutional: He is oriented to person, place, and time. He appears well-developed and well-nourished. No distress.   HENT:   Head: Normocephalic and atraumatic.   Eyes: EOM are normal. Pupils are equal, round, and reactive to light.   Neck: No JVD present.   Cardiovascular: Normal rate, regular rhythm, normal heart sounds and intact distal pulses.  Exam reveals no gallop and no friction rub.    No murmur heard.  Pulmonary/Chest: Effort normal and breath sounds normal. No respiratory distress. He has no wheezes. He has no rales. He exhibits no tenderness.   Musculoskeletal: Normal range of motion. He exhibits no edema.   Neurological: He is alert and oriented to person, place, and time. No cranial nerve deficit.   Skin: Skin is warm and dry. No rash noted. No erythema. No pallor.   Psychiatric: He has a normal mood and affect. His behavior is normal.   Nursing note and vitals reviewed.      Procedure   Procedures       Assessment/Plan     Problems Addressed this Visit        Cardiovascular and Mediastinum    Coronary artery disease involving native coronary artery of native heart without angina pectoris - Primary    Essential hypertension       Respiratory    Shortness of breath      Other Visit Diagnoses    None.           Recommendations  1.  Patient doing remarkably well.  He has felt much improvement since the procedure.  He is on appropriate medications.  We will see him back for follow-up in 6 months or sooner symptoms discussed.  Follow-up primary as scheduled              Patient's Body mass index is 26.81 kg/m². BMI is within normal parameters. No follow-up required.       Electronically signed by:    "

## 2018-05-17 ENCOUNTER — LAB (OUTPATIENT)
Dept: LAB | Facility: HOSPITAL | Age: 68
End: 2018-05-17

## 2018-05-17 ENCOUNTER — OUTSIDE FACILITY SERVICE (OUTPATIENT)
Dept: CARDIOLOGY | Facility: CLINIC | Age: 68
End: 2018-05-17

## 2018-05-17 ENCOUNTER — HOSPITAL ENCOUNTER (OUTPATIENT)
Dept: CARDIOLOGY | Facility: HOSPITAL | Age: 68
Discharge: HOME | End: 2018-05-17
Admitting: PHYSICIAN ASSISTANT

## 2018-05-17 ENCOUNTER — OFFICE VISIT (OUTPATIENT)
Dept: CARDIOLOGY | Facility: CLINIC | Age: 68
End: 2018-05-17

## 2018-05-17 ENCOUNTER — TELEPHONE (OUTPATIENT)
Dept: CARDIOLOGY | Facility: CLINIC | Age: 68
End: 2018-05-17

## 2018-05-17 VITALS
SYSTOLIC BLOOD PRESSURE: 125 MMHG | BODY MASS INDEX: 26.15 KG/M2 | HEART RATE: 86 BPM | DIASTOLIC BLOOD PRESSURE: 79 MMHG | WEIGHT: 166.6 LBS | OXYGEN SATURATION: 96 % | HEIGHT: 67 IN

## 2018-05-17 DIAGNOSIS — R06.02 SHORTNESS OF BREATH: ICD-10-CM

## 2018-05-17 DIAGNOSIS — I25.10 CORONARY ARTERY DISEASE INVOLVING NATIVE CORONARY ARTERY OF NATIVE HEART WITHOUT ANGINA PECTORIS: ICD-10-CM

## 2018-05-17 DIAGNOSIS — R07.2 PRECORDIAL PAIN: ICD-10-CM

## 2018-05-17 DIAGNOSIS — R10.13 EPIGASTRIC PAIN: ICD-10-CM

## 2018-05-17 DIAGNOSIS — R07.2 PRECORDIAL PAIN: Primary | ICD-10-CM

## 2018-05-17 LAB
MAXIMAL PREDICTED HEART RATE: 151 BPM
STRESS TARGET HR: 128 BPM

## 2018-05-17 PROCEDURE — 80053 COMPREHEN METABOLIC PANEL: CPT | Performed by: PHYSICIAN ASSISTANT

## 2018-05-17 PROCEDURE — 85379 FIBRIN DEGRADATION QUANT: CPT | Performed by: PHYSICIAN ASSISTANT

## 2018-05-17 PROCEDURE — 93000 ELECTROCARDIOGRAM COMPLETE: CPT | Performed by: PHYSICIAN ASSISTANT

## 2018-05-17 PROCEDURE — 93308 TTE F-UP OR LMTD: CPT | Performed by: INTERNAL MEDICINE

## 2018-05-17 PROCEDURE — 36415 COLL VENOUS BLD VENIPUNCTURE: CPT

## 2018-05-17 PROCEDURE — 85025 COMPLETE CBC W/AUTO DIFF WBC: CPT | Performed by: PHYSICIAN ASSISTANT

## 2018-05-17 PROCEDURE — 93308 TTE F-UP OR LMTD: CPT

## 2018-05-17 PROCEDURE — 83690 ASSAY OF LIPASE: CPT | Performed by: PHYSICIAN ASSISTANT

## 2018-05-17 PROCEDURE — 99214 OFFICE O/P EST MOD 30 MIN: CPT | Performed by: PHYSICIAN ASSISTANT

## 2018-05-17 PROCEDURE — 84484 ASSAY OF TROPONIN QUANT: CPT | Performed by: PHYSICIAN ASSISTANT

## 2018-05-17 RX ORDER — RANOLAZINE 500 MG/1
500 TABLET, EXTENDED RELEASE ORAL EVERY 12 HOURS SCHEDULED
Qty: 60 TABLET | Refills: 6 | Status: SHIPPED | OUTPATIENT
Start: 2018-05-17 | End: 2019-10-17 | Stop reason: SDUPTHER

## 2018-05-17 NOTE — PROGRESS NOTES
Problem list     Subjective   Aly Gabriel III is a 68 y.o. male     Chief Complaint   Patient presents with   • Chest Pain     presents with chest pian and hypotension two weeks after cath   • Hypotension       HPI       Problem list     1. Three-vessel coronary artery disease  1.1 cardiac catheterization May 2017 because of inferobasal, diaphragmatic, and distal lateral ischemia demonstrating severe three-vessel disease with recommendations for mechanical revascularization.   1.2 Coronary artery bypass grafting with LIMA to LAD, vein graft to OM, vein graft to PDA and posterolateral branch by Dr. Ronal Anthony June 2017   1.3 stress test April 2018 demonstrates no evidence of ischemia and preserved LV function  1.4 cardiac catheterization May 2018 with patent LIMA and vein graft to circumflex with 90% disease in the vein graft to the RCA.  Stenting performed and nonobstructive disease otherwise  2. Preserved systolic function  2.1 Echo 8/7/17-mild LVH, EF 50-55%, diastolic dysfunction 2, basal inferoseptal, and apical septal wall segments are hypokinetic, mid inferoseptal wall segment is akinetic, mild MR, trace TR and AR, PA 30-35  3. 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 Hypertension  5. Dyslipidemia history.   6.  Mild mitral and tricuspid regurgitation    Patient is a 68-year-old male that presents back for follow-up.  We saw patient approximately week ago and he was post intervention and stenting of the vein graft to the right coronary artery.  He was done remarkably well and 6 month appointment was scheduled for follow-up.  However, wife describes that he has had worsening symptoms.  Otherwise few days he's had significant discomfort  and hypotension with systolic blood pressures ranging from 70s to 90s millimeters mercury.  Patient has had chest discomfort.  Patient expresses to me that he has substernal and right-sided chest discomfort.  He doesn't describe left-sided discomfort as he felt whenever he had significant ischemia.  Because of patient's prior CVA he cannot describe his pain rather point and make expressions.    Patient has mild dyspnea but nothing progressive.  No PND or orthopnea.  He doesn't palpitate or have dysrhythmic symptoms and otherwise is doing well      Outpatient Encounter Prescriptions as of 5/17/2018   Medication Sig Dispense Refill   • aspirin 81 MG tablet Take 1 tablet by mouth Daily. 30 tablet 11   • atorvastatin (LIPITOR) 40 MG tablet Take 1 tablet by mouth Every Night. 30 tablet 11   • azelastine (ASTELIN) 0.1 % nasal spray      • clopidogrel (PLAVIX) 75 MG tablet Take 1 tablet by mouth Daily. 30 tablet 11   • docusate sodium (COLACE) 100 MG capsule Take 200 mg by mouth 2 (Two) Times a Day.     • furosemide (LASIX) 40 MG tablet Take 1 tablet by mouth Daily. 30 tablet 11   • HYDROcodone-acetaminophen (NORCO) 5-325 MG per tablet Take 1 tablet by mouth Every 6 (Six) Hours As Needed for Moderate Pain (4-6).     • metoclopramide (REGLAN) 10 MG tablet Daily.     • nitroglycerin (NITROSTAT) 0.4 MG SL tablet 1 under the tongue as needed for angina, may repeat q5mins for up three doses (Patient taking differently: Place 0.4 mg under the tongue Every 5 (Five) Minutes As Needed for Chest Pain. 1 under the tongue as needed for angina, may repeat q5mins for up three doses) 100 tablet 11   • potassium chloride (K-DUR) 10 MEQ CR tablet Take 1 tablet by mouth Daily. 30 tablet 6   • [DISCONTINUED] fluticasone (FLONASE) 50 MCG/ACT nasal spray prn     • [DISCONTINUED] isosorbide mononitrate (IMDUR) 30 MG 24 hr tablet Take 1 tablet by mouth Every Morning. 30 tablet 11   • [DISCONTINUED] metoprolol tartrate (LOPRESSOR) 25 MG tablet  Take 1 tablet by mouth 2 (Two) Times a Day. 60 tablet 6   • pantoprazole (PROTONIX) 40 MG EC tablet Take 1 tablet by mouth Daily. 30 tablet 6   • ranolazine (RANEXA) 500 MG 12 hr tablet Take 1 tablet by mouth Every 12 (Twelve) Hours. 60 tablet 6   • [DISCONTINUED] azithromycin (ZITHROMAX Z-JOHN) 250 MG tablet Take 2 tablets the first day, then 1 tablet daily for 4 days. 6 tablet 0     Facility-Administered Encounter Medications as of 5/17/2018   Medication Dose Route Frequency Provider Last Rate Last Dose   • Chlorhexidine Gluconate Cloth 2 % pads 1 application  1 application Topical Q12H PRN JE Delgado           Patient has no known allergies.    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   • Wears glasses    • Wears partial dentures        Social History     Social History   • Marital status:      Spouse name: N/A   • Number of children: 0   • Years of education: N/A     Occupational History   • GM Retired     Social History Main Topics   • Smoking status: Former Smoker     Packs/day: 2.00     Years: 30.00     Types: Cigarettes     Quit date: 6/19/2014   • Smokeless tobacco: Never Used   • Alcohol use No   • Drug use: No   • Sexual activity: Yes     Partners: Female      Comment: spouse     Other Topics Concern   • Not on file     Social History Narrative   • No narrative on file       Family History   Problem Relation Age of Onset   • Heart disease Mother    • Heart disease Father        Review of Systems   Constitutional: Positive for diaphoresis (every time he gets up to walk) and fatigue. Negative for chills and fever.   Eyes: Positive for visual disturbance (wears glasses).   Respiratory: Positive for  "shortness of breath (easily soa with normal daily activity).    Cardiovascular: Positive for chest pain (patient states\"really bad pain all over chest\" starting one week after cath  ). Negative for palpitations and leg swelling.   Gastrointestinal: Negative.    Endocrine: Negative.    Genitourinary: Negative.    Musculoskeletal: Positive for arthralgias, back pain and neck pain (bone spurs).   Skin: Negative.    Allergic/Immunologic: Negative.    Neurological: Positive for dizziness (chest pain causes balance issues), weakness and headaches. Negative for syncope.   Hematological: Bruises/bleeds easily (both).   Psychiatric/Behavioral: Positive for agitation and sleep disturbance (up all night with chest pain). The patient is nervous/anxious.    All other systems reviewed and are negative.      Objective   Vitals:    05/17/18 1017   BP: 125/79   BP Location: Left arm   Patient Position: Sitting   Pulse: 86   SpO2: 96%   Weight: 75.6 kg (166 lb 9.6 oz)   Height: 170.2 cm (67.01\")      /79 (BP Location: Left arm, Patient Position: Sitting)   Pulse 86   Ht 170.2 cm (67.01\")   Wt 75.6 kg (166 lb 9.6 oz)   SpO2 96%   BMI 26.09 kg/m²     Lab Results (most recent)     None          Physical Exam   Constitutional: He is oriented to person, place, and time. He appears well-developed and well-nourished. No distress.   HENT:   Head: Normocephalic and atraumatic.   Eyes: EOM are normal. Pupils are equal, round, and reactive to light.   Neck: No JVD present.   Cardiovascular: Normal rate, regular rhythm, normal heart sounds and intact distal pulses.  Exam reveals no gallop and no friction rub.    No murmur heard.  Pulmonary/Chest: Effort normal and breath sounds normal. No respiratory distress. He has no wheezes. He has no rales. He exhibits no tenderness.   Musculoskeletal: Normal range of motion. He exhibits no edema.   Neurological: He is alert and oriented to person, place, and time. No cranial nerve deficit. "   Skin: Skin is warm and dry. No rash noted. No erythema. No pallor.   Psychiatric: He has a normal mood and affect. His behavior is normal.   Nursing note and vitals reviewed.      Procedure     ECG 12 Lead  Date/Time: 5/17/2018 10:25 AM  Performed by: NEHEMIAH BURTON  Authorized by: NEHEMIAH BURTON   Comments: EKG demonstrates sinus rhythm at 82 bpm with PACs, no acute ST changes               Assessment/Plan     Problems Addressed this Visit        Cardiovascular and Mediastinum    Coronary artery disease involving native coronary artery of native heart without angina pectoris    Relevant Medications    ranolazine (RANEXA) 500 MG 12 hr tablet    Other Relevant Orders    Troponin I    D-dimer, Quantitative    CBC & Differential    Comprehensive Metabolic Panel    Adult Transthoracic Echo Limited W/ Cont if Necessary Per Protocol       Respiratory    Shortness of breath    Relevant Orders    Troponin I    D-dimer, Quantitative    CBC & Differential    Comprehensive Metabolic Panel    Adult Transthoracic Echo Limited W/ Cont if Necessary Per Protocol       Nervous and Auditory    Precordial pain - Primary    Relevant Orders    ECG 12 Lead    Troponin I    D-dimer, Quantitative    CBC & Differential    Comprehensive Metabolic Panel    Adult Transthoracic Echo Limited W/ Cont if Necessary Per Protocol            Recommendation  1.  I am unsure what is causing the patient's pain.  I would like to perform routine laboratories to rule out noncardiac causes.  I would like to get a d-dimer to rule out pulmonary embolus.  I'm checking a troponin level.  EKG shows nothing acute.  Chest pain appears atypical.  2.  Would like to get a limited echocardiogram to rule out effusion because of recent cardiac procedure.  3.  I'm stopping patient's metoprolol and isosorbide.  Patient's hypotension may be causing much of his symptoms.  I will replace isosorbide with Ranexa.  4.  I would like to see him back for follow-up in 3-4  weeks.  He is describing abdominal pain and I will check abdominal labs as well. If symptoms worsen, that we'll go to the ER.  Otherwise follow-up with primary as scheduled              Patient's Body mass index is 26.09 kg/m². BMI is within normal parameters. No follow-up required.       Electronically signed by:

## 2018-05-17 NOTE — TELEPHONE ENCOUNTER
Patient wife made aware that the testing was done today and Dr. Ryan probably would not be back in office until monday as he is at the cath lab today and has cath's tomorrow. I spoke with Delta Shah, PAC he states he should be fine to proceed with his reservations and with worsening symptoms to proceed to ER. Patient wife made aware and explained to her that once the testing was read we would call with results. Patient wife verbalized understanding and to call our office with questions or concerns      ----- Message from Tiera Garcias sent at 5/17/2018  2:43 PM EDT -----  Contact: GAUTAM (WIFE)  THE PATIENTS WIFE CALLED AND STATES SHE WOULD LIKE TO SPEAK TO DELTA ABOUT THE ULTRASOUND HE HAD DONE TODAY.  SHE STATES IF IT DOESN'T COME BACK GOOD THEN SHE WILL HAVE TO CANCEL SOME RESERVATIONS.  SHE CAN BE REACHED [phone].  THANKS

## 2018-05-18 LAB
ALBUMIN SERPL-MCNC: 4.7 G/DL (ref 3.4–4.8)
ALBUMIN/GLOB SERPL: 1.7 G/DL (ref 1.5–2.5)
ALP SERPL-CCNC: 70 U/L (ref 40–129)
ALT SERPL W P-5'-P-CCNC: 14 U/L (ref 10–44)
ANION GAP SERPL CALCULATED.3IONS-SCNC: 5.1 MMOL/L (ref 3.6–11.2)
AST SERPL-CCNC: 15 U/L (ref 10–34)
BASOPHILS # BLD AUTO: 0.05 10*3/MM3 (ref 0–0.3)
BASOPHILS NFR BLD AUTO: 0.4 % (ref 0–2)
BILIRUB SERPL-MCNC: 0.5 MG/DL (ref 0.2–1.8)
BUN BLD-MCNC: 12 MG/DL (ref 7–21)
BUN/CREAT SERPL: 13.8 (ref 7–25)
CALCIUM SPEC-SCNC: 9.2 MG/DL (ref 7.7–10)
CHLORIDE SERPL-SCNC: 106 MMOL/L (ref 99–112)
CO2 SERPL-SCNC: 25.9 MMOL/L (ref 24.3–31.9)
CREAT BLD-MCNC: 0.87 MG/DL (ref 0.43–1.29)
D DIMER PPP FEU-MCNC: <0.27 MCGFEU/ML (ref 0–0.5)
DEPRECATED RDW RBC AUTO: 45.6 FL (ref 37–54)
EOSINOPHIL # BLD AUTO: 0.76 10*3/MM3 (ref 0–0.7)
EOSINOPHIL NFR BLD AUTO: 5.5 % (ref 0–7)
ERYTHROCYTE [DISTWIDTH] IN BLOOD BY AUTOMATED COUNT: 13.5 % (ref 11.5–14.5)
GFR SERPL CREATININE-BSD FRML MDRD: 87 ML/MIN/1.73
GLOBULIN UR ELPH-MCNC: 2.7 GM/DL
GLUCOSE BLD-MCNC: 90 MG/DL (ref 70–110)
HCT VFR BLD AUTO: 45.9 % (ref 42–52)
HGB BLD-MCNC: 15.4 G/DL (ref 14–18)
IMM GRANULOCYTES # BLD: 0.06 10*3/MM3 (ref 0–0.03)
IMM GRANULOCYTES NFR BLD: 0.4 % (ref 0–0.5)
LIPASE SERPL-CCNC: 22 U/L (ref 13–60)
LYMPHOCYTES # BLD AUTO: 3.49 10*3/MM3 (ref 1–3)
LYMPHOCYTES NFR BLD AUTO: 25.4 % (ref 16–46)
MCH RBC QN AUTO: 32.2 PG (ref 27–33)
MCHC RBC AUTO-ENTMCNC: 33.6 G/DL (ref 33–37)
MCV RBC AUTO: 96 FL (ref 80–94)
MONOCYTES # BLD AUTO: 1.13 10*3/MM3 (ref 0.1–0.9)
MONOCYTES NFR BLD AUTO: 8.2 % (ref 0–12)
NEUTROPHILS # BLD AUTO: 8.24 10*3/MM3 (ref 1.4–6.5)
NEUTROPHILS NFR BLD AUTO: 60.1 % (ref 40–75)
OSMOLALITY SERPL CALC.SUM OF ELEC: 273.1 MOSM/KG (ref 273–305)
PLATELET # BLD AUTO: 295 10*3/MM3 (ref 130–400)
PMV BLD AUTO: 10.3 FL (ref 6–10)
POTASSIUM BLD-SCNC: 4.7 MMOL/L (ref 3.5–5.3)
PROT SERPL-MCNC: 7.4 G/DL (ref 6–8)
RBC # BLD AUTO: 4.78 10*6/MM3 (ref 4.7–6.1)
SODIUM BLD-SCNC: 137 MMOL/L (ref 135–153)
TROPONIN I SERPL-MCNC: <0.006 NG/ML
WBC NRBC COR # BLD: 13.73 10*3/MM3 (ref 4.5–12.5)

## 2018-05-21 ENCOUNTER — TELEPHONE (OUTPATIENT)
Dept: CARDIOLOGY | Facility: CLINIC | Age: 68
End: 2018-05-21

## 2018-05-21 NOTE — TELEPHONE ENCOUNTER
5-22-18 SPOKE WITH MARCIE SILVERIO THIS AM AND ECHO RESULTS REVIEWED. V/O RECEIVED CAN HOLD LASIX IF NOT NEEDING IT TO SEE IF WILL HELP WITH B/P AND SCHEDULE SOONER APPT. THAN 6-13-18. SPOKE WITH WIFE THIS AM AND SHE STATES SHE ONLY GIVES HIM THE LASIX WHEN HE NEEDS IT AND HE HAS ONLY HAD X 1 DOSE WITHIN LAST COUPLE WEEKS. APPT. SCHEDULED FOR AM. WIFE AWARE. MALA MATUTE            WIFE AWARE OF ECHO RESULTS. STATES HIS B/P IS STILL RUNNING 79/44, 90/40 AND H/R RANGES . STATES HE IS SO WEAK HE CAN'T HARDLY WALK ANYMORE. TOLD HER I WOULD DISCUSS ABOVE WITH NEHEMIAH SERRATO. AND WOULD CALL HER BACK. MALA MATUTE        ----- Message from Tiera Garcias sent at 5/21/2018  4:07 PM EDT -----  Contact: GAUTAM (WIFE)  THE PATIENTS WIFE CALLED WANTING TO SPEAK TO A NURSE ABOUT THE TESTING HER  HAD DONE LAST WEEK.  SHE CAN BE REACHED [phone].  THANKS

## 2018-05-23 ENCOUNTER — OFFICE VISIT (OUTPATIENT)
Dept: CARDIOLOGY | Facility: CLINIC | Age: 68
End: 2018-05-23

## 2018-05-23 VITALS
OXYGEN SATURATION: 96 % | WEIGHT: 168.4 LBS | HEART RATE: 81 BPM | BODY MASS INDEX: 26.43 KG/M2 | HEIGHT: 67 IN | DIASTOLIC BLOOD PRESSURE: 89 MMHG | SYSTOLIC BLOOD PRESSURE: 140 MMHG

## 2018-05-23 DIAGNOSIS — R06.02 SHORTNESS OF BREATH: Primary | ICD-10-CM

## 2018-05-23 DIAGNOSIS — R07.9 CHEST PAIN, UNSPECIFIED TYPE: ICD-10-CM

## 2018-05-23 DIAGNOSIS — I25.10 CORONARY ARTERY DISEASE INVOLVING NATIVE CORONARY ARTERY OF NATIVE HEART WITHOUT ANGINA PECTORIS: ICD-10-CM

## 2018-05-23 PROCEDURE — 99213 OFFICE O/P EST LOW 20 MIN: CPT | Performed by: PHYSICIAN ASSISTANT

## 2018-05-23 RX ORDER — FLUDROCORTISONE ACETATE 0.1 MG/1
0.1 TABLET ORAL DAILY
Qty: 30 TABLET | Refills: 6 | Status: SHIPPED | OUTPATIENT
Start: 2018-05-23 | End: 2019-04-10

## 2018-05-23 NOTE — PROGRESS NOTES
Problem list     Subjective   Aly Gabriel III is a 68 y.o. male     Chief Complaint   Patient presents with   • Coronary Artery Disease     presents as a follow up   • Chest Pain   • Hypertension       HPI       Problem list     1. Three-vessel coronary artery disease  1.1 cardiac catheterization May 2017 because of inferobasal, diaphragmatic, and distal lateral ischemia demonstrating severe three-vessel disease with recommendations for mechanical revascularization.   1.2 Coronary artery bypass grafting with LIMA to LAD, vein graft to OM, vein graft to PDA and posterolateral branch by Dr. Ronal Anthony June 2017   1.3 stress test April 2018 demonstrates no evidence of ischemia and preserved LV function  1.4 cardiac catheterization May 2018 with patent LIMA and vein graft to circumflex with 90% disease in the vein graft to the RCA.  Stenting performed and nonobstructive disease otherwise  2. Preserved systolic function  2.1 Echo 8/7/17-mild LVH, EF 50-55%, diastolic dysfunction 2, basal inferoseptal, and apical septal wall segments are hypokinetic, mid inferoseptal wall segment is akinetic, mild MR, trace TR and AR, PA 30-35  3. 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 Hypertension  5. Dyslipidemia history.   6.  Mild mitral and tricuspid regurgitation    Patient is a 68-year-old male that presents back for follow-up.  He is feeling well today.  His wife brings in blood pressure readings in which systolic blood pressures have been anywhere from 60s to 90s systolic.  Today, she notices it much improved.  Whenever his blood pressure significantly low he will have discomfort.  He has only been taking Lasix as needed and hasn't taken it in a while.   Patient is not chest pain.  Occasionally will get a right-sided chest and abdominal pain.  Because of patient's prior CVA, history is difficult to be obtained from the patient.  Patient has mild dyspnea but does not describe progressive shortness of breath.  No PND orthopnea.  No palpitations.  Occasional dizziness upon standing.  No presyncope or syncope and otherwise is doing well    Echo demonstrated normal LV function.  No effusion.  Normal valvular function.  Laboratories were unremarkable      Outpatient Encounter Prescriptions as of 5/23/2018   Medication Sig Dispense Refill   • aspirin 81 MG tablet Take 1 tablet by mouth Daily. 30 tablet 11   • atorvastatin (LIPITOR) 40 MG tablet Take 1 tablet by mouth Every Night. 30 tablet 11   • azelastine (ASTELIN) 0.1 % nasal spray      • clopidogrel (PLAVIX) 75 MG tablet Take 1 tablet by mouth Daily. 30 tablet 11   • docusate sodium (COLACE) 100 MG capsule Take 200 mg by mouth 2 (Two) Times a Day.     • furosemide (LASIX) 40 MG tablet Take 1 tablet by mouth Daily. 30 tablet 11   • HYDROcodone-acetaminophen (NORCO) 5-325 MG per tablet Take 1 tablet by mouth Every 6 (Six) Hours As Needed for Moderate Pain (4-6).     • metoclopramide (REGLAN) 10 MG tablet Daily.     • nitroglycerin (NITROSTAT) 0.4 MG SL tablet 1 under the tongue as needed for angina, may repeat q5mins for up three doses (Patient taking differently: Place 0.4 mg under the tongue Every 5 (Five) Minutes As Needed for Chest Pain. 1 under the tongue as needed for angina, may repeat q5mins for up three doses) 100 tablet 11   • pantoprazole (PROTONIX) 40 MG EC tablet Take 1 tablet by mouth Daily. 30 tablet 6   • potassium chloride (K-DUR) 10 MEQ CR tablet Take 1 tablet by mouth Daily. 30 tablet 6   • ranolazine (RANEXA) 500 MG 12 hr tablet Take 1 tablet by mouth Every 12 (Twelve) Hours. 60 tablet 6   • fludrocortisone 0.1 MG tablet Take 1 tablet by mouth Daily. 30 tablet 6     Facility-Administered Encounter  Medications as of 5/23/2018   Medication Dose Route Frequency Provider Last Rate Last Dose   • Chlorhexidine Gluconate Cloth 2 % pads 1 application  1 application Topical Q12H PRN JE Delgado           Patient has no known allergies.    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   • Wears glasses    • Wears partial dentures        Social History     Social History   • Marital status:      Spouse name: N/A   • Number of children: 0   • Years of education: N/A     Occupational History   • GM Retired     Social History Main Topics   • Smoking status: Former Smoker     Packs/day: 2.00     Years: 30.00     Types: Cigarettes     Quit date: 6/19/2014   • Smokeless tobacco: Never Used   • Alcohol use No   • Drug use: No   • Sexual activity: Yes     Partners: Female      Comment: spouse     Other Topics Concern   • Not on file     Social History Narrative   • No narrative on file       Family History   Problem Relation Age of Onset   • Heart disease Mother    • Heart disease Father        Review of Systems   Constitutional: Positive for fatigue.   HENT: Positive for hearing loss.    Eyes: Positive for visual disturbance (glasses).   Respiratory: Negative.    Cardiovascular: Positive for chest pain. Negative for palpitations and leg swelling.   Gastrointestinal: Negative.    Endocrine: Negative.    Genitourinary: Negative.    Musculoskeletal: Positive for arthralgias, back pain, myalgias and neck pain.   Skin: Negative.    Allergic/Immunologic: Positive for environmental allergies.   Neurological: Positive for dizziness and light-headedness.   Hematological: Bruises/bleeds easily (bruise).   Psychiatric/Behavioral:  "Positive for sleep disturbance.       Objective   Vitals:    05/23/18 0914   BP: 140/89   BP Location: Left arm   Patient Position: Sitting   Pulse: 81   SpO2: 96%   Weight: 76.4 kg (168 lb 6.4 oz)   Height: 170.2 cm (67.01\")      /89 (BP Location: Left arm, Patient Position: Sitting)   Pulse 81   Ht 170.2 cm (67.01\")   Wt 76.4 kg (168 lb 6.4 oz)   SpO2 96%   BMI 26.37 kg/m²     Lab Results (most recent)     None          Physical Exam   Constitutional: He is oriented to person, place, and time. He appears well-developed and well-nourished. No distress.   HENT:   Head: Normocephalic and atraumatic.   Eyes: EOM are normal. Pupils are equal, round, and reactive to light.   Neck: No JVD present.   Cardiovascular: Normal rate, regular rhythm, normal heart sounds and intact distal pulses.  Exam reveals no gallop and no friction rub.    No murmur heard.  Pulmonary/Chest: Effort normal and breath sounds normal. No respiratory distress. He has no wheezes. He has no rales. He exhibits no tenderness.   Musculoskeletal: Normal range of motion. He exhibits no edema.   Neurological: He is alert and oriented to person, place, and time. No cranial nerve deficit.   Skin: Skin is warm and dry. No rash noted. No erythema. No pallor.   Psychiatric: He has a normal mood and affect. His behavior is normal.   Nursing note and vitals reviewed.      Procedure   Procedures       Assessment/Plan     Problems Addressed this Visit        Cardiovascular and Mediastinum    Coronary artery disease involving native coronary artery of native heart without angina pectoris       Respiratory    Shortness of breath - Primary       Nervous and Auditory    Chest pain           recommendation  1.  It is apparent after catheterization, patient has been having hypotension but is not on any medications to cause it.  Laboratories are normal.  Echocardiogram is normal.  Occasionally he will have orthostasis as well.  I would like to try low-dose " Florinef to see if that'll help.  I discussed with the wife that if blood pressures are doing good to hold on this medication.  Otherwise, he has idiopathic hypotension.  2.  He is feeling good otherwise.  He has right-sided abdominal pain and chest pain that I discussed with him to follow-up with primary for further evaluation.  We will see him back for follow-up in 2-3 months.  Follow-up primary as scheduled                Patient's Body mass index is 26.37 kg/m². BMI is within normal parameters. No follow-up required.       Electronically signed by:

## 2018-07-11 RX ORDER — ATORVASTATIN CALCIUM 40 MG/1
TABLET, FILM COATED ORAL
Qty: 30 TABLET | Refills: 10 | OUTPATIENT
Start: 2018-07-11

## 2018-07-13 ENCOUNTER — TELEPHONE (OUTPATIENT)
Dept: CARDIOLOGY | Facility: CLINIC | Age: 68
End: 2018-07-13

## 2018-07-13 DIAGNOSIS — E78.2 MIXED HYPERLIPIDEMIA: Primary | ICD-10-CM

## 2018-07-13 RX ORDER — ATORVASTATIN CALCIUM 40 MG/1
TABLET, FILM COATED ORAL
Qty: 30 TABLET | Refills: 10 | OUTPATIENT
Start: 2018-07-13

## 2018-07-13 RX ORDER — ATORVASTATIN CALCIUM 40 MG/1
40 TABLET, FILM COATED ORAL DAILY
Qty: 30 TABLET | Refills: 11 | Status: SHIPPED | OUTPATIENT
Start: 2018-07-13 | End: 2019-07-21 | Stop reason: SDUPTHER

## 2018-08-07 ENCOUNTER — APPOINTMENT (OUTPATIENT)
Dept: GENERAL RADIOLOGY | Facility: HOSPITAL | Age: 68
End: 2018-08-07

## 2018-08-07 ENCOUNTER — APPOINTMENT (OUTPATIENT)
Dept: CT IMAGING | Facility: HOSPITAL | Age: 68
End: 2018-08-07

## 2018-08-07 ENCOUNTER — HOSPITAL ENCOUNTER (EMERGENCY)
Facility: HOSPITAL | Age: 68
Discharge: HOME | End: 2018-08-07
Attending: EMERGENCY MEDICINE | Admitting: EMERGENCY MEDICINE

## 2018-08-07 VITALS
HEART RATE: 63 BPM | HEIGHT: 67 IN | WEIGHT: 164 LBS | SYSTOLIC BLOOD PRESSURE: 152 MMHG | BODY MASS INDEX: 25.74 KG/M2 | TEMPERATURE: 98.4 F | DIASTOLIC BLOOD PRESSURE: 96 MMHG | OXYGEN SATURATION: 96 % | RESPIRATION RATE: 16 BRPM

## 2018-08-07 DIAGNOSIS — R10.10 PAIN OF UPPER ABDOMEN: Primary | ICD-10-CM

## 2018-08-07 LAB
ALBUMIN SERPL-MCNC: 4.1 G/DL (ref 3.2–4.8)
ALBUMIN/GLOB SERPL: 1.5 G/DL (ref 1.5–2.5)
ALP SERPL-CCNC: 60 U/L (ref 25–100)
ALT SERPL W P-5'-P-CCNC: 13 U/L (ref 7–40)
ANION GAP SERPL CALCULATED.3IONS-SCNC: 0 MMOL/L (ref 3–11)
AST SERPL-CCNC: 12 U/L (ref 0–33)
BASOPHILS # BLD AUTO: 0.03 10*3/MM3 (ref 0–0.2)
BASOPHILS NFR BLD AUTO: 0.3 % (ref 0–1)
BILIRUB SERPL-MCNC: 0.3 MG/DL (ref 0.3–1.2)
BILIRUB UR QL STRIP: NEGATIVE
BUN BLD-MCNC: 9 MG/DL (ref 9–23)
BUN/CREAT SERPL: 9.9 (ref 7–25)
CALCIUM SPEC-SCNC: 9.4 MG/DL (ref 8.7–10.4)
CHLORIDE SERPL-SCNC: 111 MMOL/L (ref 99–109)
CLARITY UR: CLEAR
CO2 SERPL-SCNC: 30 MMOL/L (ref 20–31)
COLOR UR: YELLOW
CREAT BLD-MCNC: 0.91 MG/DL (ref 0.6–1.3)
DEPRECATED RDW RBC AUTO: 48.7 FL (ref 37–54)
EOSINOPHIL # BLD AUTO: 0.35 10*3/MM3 (ref 0–0.3)
EOSINOPHIL NFR BLD AUTO: 3.2 % (ref 0–3)
ERYTHROCYTE [DISTWIDTH] IN BLOOD BY AUTOMATED COUNT: 13.6 % (ref 11.3–14.5)
GFR SERPL CREATININE-BSD FRML MDRD: 83 ML/MIN/1.73
GLOBULIN UR ELPH-MCNC: 2.8 GM/DL
GLUCOSE BLD-MCNC: 95 MG/DL (ref 70–100)
GLUCOSE UR STRIP-MCNC: NEGATIVE MG/DL
HCT VFR BLD AUTO: 46.1 % (ref 38.9–50.9)
HGB BLD-MCNC: 15.3 G/DL (ref 13.1–17.5)
HGB UR QL STRIP.AUTO: NEGATIVE
HOLD SPECIMEN: NORMAL
HOLD SPECIMEN: NORMAL
IMM GRANULOCYTES # BLD: 0.04 10*3/MM3 (ref 0–0.03)
IMM GRANULOCYTES NFR BLD: 0.4 % (ref 0–0.6)
KETONES UR QL STRIP: NEGATIVE
LEUKOCYTE ESTERASE UR QL STRIP.AUTO: NEGATIVE
LIPASE SERPL-CCNC: 15 U/L (ref 6–51)
LYMPHOCYTES # BLD AUTO: 3.5 10*3/MM3 (ref 0.6–4.8)
LYMPHOCYTES NFR BLD AUTO: 31.8 % (ref 24–44)
MCH RBC QN AUTO: 32.3 PG (ref 27–31)
MCHC RBC AUTO-ENTMCNC: 33.2 G/DL (ref 32–36)
MCV RBC AUTO: 97.5 FL (ref 80–99)
MONOCYTES # BLD AUTO: 0.87 10*3/MM3 (ref 0–1)
MONOCYTES NFR BLD AUTO: 7.9 % (ref 0–12)
NEUTROPHILS # BLD AUTO: 6.27 10*3/MM3 (ref 1.5–8.3)
NEUTROPHILS NFR BLD AUTO: 56.8 % (ref 41–71)
NITRITE UR QL STRIP: NEGATIVE
PH UR STRIP.AUTO: 6.5 [PH] (ref 5–8)
PLATELET # BLD AUTO: 310 10*3/MM3 (ref 150–450)
PMV BLD AUTO: 9.8 FL (ref 6–12)
POTASSIUM BLD-SCNC: 4.1 MMOL/L (ref 3.5–5.5)
PROT SERPL-MCNC: 6.9 G/DL (ref 5.7–8.2)
PROT UR QL STRIP: NEGATIVE
RBC # BLD AUTO: 4.73 10*6/MM3 (ref 4.2–5.76)
SODIUM BLD-SCNC: 141 MMOL/L (ref 132–146)
SP GR UR STRIP: <=1.005 (ref 1–1.03)
TROPONIN I SERPL-MCNC: 0 NG/ML (ref 0–0.07)
UROBILINOGEN UR QL STRIP: NORMAL
WBC NRBC COR # BLD: 11.02 10*3/MM3 (ref 3.5–10.8)
WHOLE BLOOD HOLD SPECIMEN: NORMAL
WHOLE BLOOD HOLD SPECIMEN: NORMAL

## 2018-08-07 PROCEDURE — 99284 EMERGENCY DEPT VISIT MOD MDM: CPT

## 2018-08-07 PROCEDURE — 93005 ELECTROCARDIOGRAM TRACING: CPT | Performed by: EMERGENCY MEDICINE

## 2018-08-07 PROCEDURE — 84484 ASSAY OF TROPONIN QUANT: CPT

## 2018-08-07 PROCEDURE — 74177 CT ABD & PELVIS W/CONTRAST: CPT

## 2018-08-07 PROCEDURE — 25010000002 IOPAMIDOL 61 % SOLUTION: Performed by: EMERGENCY MEDICINE

## 2018-08-07 PROCEDURE — 81003 URINALYSIS AUTO W/O SCOPE: CPT | Performed by: EMERGENCY MEDICINE

## 2018-08-07 PROCEDURE — 80053 COMPREHEN METABOLIC PANEL: CPT | Performed by: EMERGENCY MEDICINE

## 2018-08-07 PROCEDURE — 96374 THER/PROPH/DIAG INJ IV PUSH: CPT

## 2018-08-07 PROCEDURE — 96361 HYDRATE IV INFUSION ADD-ON: CPT

## 2018-08-07 PROCEDURE — 25010000002 ONDANSETRON PER 1 MG: Performed by: NURSE PRACTITIONER

## 2018-08-07 PROCEDURE — 71045 X-RAY EXAM CHEST 1 VIEW: CPT

## 2018-08-07 PROCEDURE — 83690 ASSAY OF LIPASE: CPT | Performed by: EMERGENCY MEDICINE

## 2018-08-07 PROCEDURE — 85025 COMPLETE CBC W/AUTO DIFF WBC: CPT | Performed by: EMERGENCY MEDICINE

## 2018-08-07 PROCEDURE — 96375 TX/PRO/DX INJ NEW DRUG ADDON: CPT

## 2018-08-07 RX ORDER — ONDANSETRON 2 MG/ML
4 INJECTION INTRAMUSCULAR; INTRAVENOUS ONCE
Status: COMPLETED | OUTPATIENT
Start: 2018-08-07 | End: 2018-08-07

## 2018-08-07 RX ORDER — ALUMINA, MAGNESIA, AND SIMETHICONE 2400; 2400; 240 MG/30ML; MG/30ML; MG/30ML
15 SUSPENSION ORAL ONCE
Status: COMPLETED | OUTPATIENT
Start: 2018-08-07 | End: 2018-08-07

## 2018-08-07 RX ORDER — SODIUM CHLORIDE 0.9 % (FLUSH) 0.9 %
10 SYRINGE (ML) INJECTION AS NEEDED
Status: DISCONTINUED | OUTPATIENT
Start: 2018-08-07 | End: 2018-08-07 | Stop reason: HOSPADM

## 2018-08-07 RX ORDER — FAMOTIDINE 10 MG/ML
20 INJECTION, SOLUTION INTRAVENOUS ONCE
Status: COMPLETED | OUTPATIENT
Start: 2018-08-07 | End: 2018-08-07

## 2018-08-07 RX ORDER — SODIUM CHLORIDE 9 MG/ML
125 INJECTION, SOLUTION INTRAVENOUS CONTINUOUS
Status: DISCONTINUED | OUTPATIENT
Start: 2018-08-07 | End: 2018-08-07 | Stop reason: HOSPADM

## 2018-08-07 RX ORDER — ONDANSETRON 2 MG/ML
4 INJECTION INTRAMUSCULAR; INTRAVENOUS ONCE
Status: DISCONTINUED | OUTPATIENT
Start: 2018-08-07 | End: 2018-08-07

## 2018-08-07 RX ADMIN — LIDOCAINE HYDROCHLORIDE 15 ML: 20 SOLUTION ORAL; TOPICAL at 16:33

## 2018-08-07 RX ADMIN — SODIUM CHLORIDE 125 ML/HR: 9 INJECTION, SOLUTION INTRAVENOUS at 12:45

## 2018-08-07 RX ADMIN — ALUMINUM HYDROXIDE, MAGNESIUM HYDROXIDE, AND DIMETHICONE 15 ML: 400; 400; 40 SUSPENSION ORAL at 16:33

## 2018-08-07 RX ADMIN — IOPAMIDOL 80 ML: 612 INJECTION, SOLUTION INTRAVENOUS at 14:25

## 2018-08-07 RX ADMIN — ONDANSETRON 4 MG: 2 INJECTION INTRAMUSCULAR; INTRAVENOUS at 12:44

## 2018-08-07 RX ADMIN — FAMOTIDINE 20 MG: 10 INJECTION INTRAVENOUS at 12:44

## 2018-08-07 NOTE — ED PROVIDER NOTES
Subjective   Aly Gabriel is a 68 yr old white male that presents emergency Department complaints of upper abdominal pain.  Wife indicates that the has had abdominal pain off and on for the past 3 months.  Patient reports this pain is now radiating into his lower abdomen.  Patient denies any chest pain, shortness of breath.  Pt denies any nausea, vomiting, but has had diarrhea.  Pt denies any fever, chills.  Pt been to 2 different emergency departments.  Patient was worked up for chest pain at one point, wife indicates that he had stents placed a few months ago.  Patient advises that this is not cardiac related and reports that it is stomach related.  Patient has an appointment to see GI on August 28 but that is is seen as he can get in.  Wife is unsure if the patient has had a CT of his abdomen and pelvis.   Pt does have aphasia from previous CVA.  Pt c/o pain in his mouth from a yeast infection according to spouse.          History provided by:  Patient and spouse  Abdominal Pain   Pain location:  Epigastric  Pain severity:  Mild  Duration: months.  Timing:  Intermittent  Progression:  Unchanged  Relieved by:  Nothing  Worsened by:  Nothing  Associated symptoms: diarrhea    Associated symptoms: no chest pain, no constipation, no cough, no dysuria, no fever, no nausea, no shortness of breath and no vomiting        Review of Systems   Constitutional: Negative for fever.   Respiratory: Negative for cough and shortness of breath.    Cardiovascular: Negative for chest pain.   Gastrointestinal: Positive for abdominal pain and diarrhea. Negative for constipation, nausea and vomiting.   Genitourinary: Negative for decreased urine volume, difficulty urinating and dysuria.       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   • Wears glasses    • Wears partial dentures        No Known Allergies    Past Surgical History:   Procedure Laterality Date   • ANKLE SURGERY     • CARDIAC CATHETERIZATION     • CAROTID ARTERY ANGIOPLASTY     • CAROTID ENDARTERECTOMY Right 2014   • CAROTID ENDARTERECTOMY     • CORONARY ARTERY BYPASS GRAFT N/A 6/28/2017    Procedure: MEDIAN STERNOTOMY, CORONARY ARTERY BYPASS graft X 4  UTILIZING THE LEFT INTERNAL MAMMARY ARTERY  AND EVH OF THE LEFT GREATER SAPHENOUS VEIN EXPLORATION OF THE RIGHT GREATER SAPHENOUS VEIN ;  Surgeon: Ronal Anthony MD;  Location: [facility];  Service:    • CORONARY STENT PLACEMENT         Family History   Problem Relation Age of Onset   • Heart disease Mother    • Heart disease Father        Social History     Social History   • Marital status:    • Number of children: 0     Occupational History   • [employer] Retired     Social History Main Topics   • Smoking status: Former Smoker     Packs/day: 2.00     Years: 30.00     Types: Cigarettes     Quit date: 6/19/2014   • Smokeless tobacco: Never Used   • Alcohol use No   • Drug use: No   • Sexual activity: Yes     Partners: Female      Comment: spouse     Other Topics Concern   • Not on file           Objective   Physical Exam   Constitutional: He is oriented to person, place, and time. He appears well-developed and well-nourished. No distress.   HENT:   Head: Normocephalic and atraumatic.   Right Ear: External ear normal.   Left Ear: External ear normal.   Mouth/Throat: Posterior oropharyngeal erythema present.   Eyes: Pupils are equal, round, and reactive to light. EOM are normal.   Neck: Normal range of motion.   Cardiovascular: Normal rate and regular rhythm.    Pulmonary/Chest: Effort normal and breath sounds normal. No respiratory distress. He has no wheezes.   Abdominal: Soft. Bowel sounds  are normal. He exhibits no distension. There is tenderness (epigastric).   Musculoskeletal: Normal range of motion.   Neurological: He is alert and oriented to person, place, and time.   Skin: Skin is warm and dry.   Psychiatric: He has a normal mood and affect.   Nursing note and vitals reviewed.      Procedures           ED Course  ED Course as of Aug 07 2309   Tue Aug 07, 2018   [id number]  patient is advise her results at this time.  Patient will be discharged home.  Patient to keep appointment as scheduled with GI.  Patient to contact GI referrals as discussed.  Patient is advised he could possibly get in sooner.  Patient and spouse agree and verbalizes understanding.  [KG]      ED Course User Index  [KG] Lucy Quick APRN          Recent Results (from the past 24 hour(s))   Comprehensive Metabolic Panel    Collection Time: 08/07/18 12:23 PM   Result Value Ref Range    Glucose 95 70 - 100 mg/dL    BUN 9 9 - 23 mg/dL    Creatinine 0.91 0.60 - 1.30 mg/dL    Sodium 141 132 - 146 mmol/L    Potassium 4.1 3.5 - 5.5 mmol/L    Chloride 111 (H) 99 - 109 mmol/L    CO2 30.0 20.0 - 31.0 mmol/L    Calcium 9.4 8.7 - 10.4 mg/dL    Total Protein 6.9 5.7 - 8.2 g/dL    Albumin 4.10 3.20 - 4.80 g/dL    ALT (SGPT) 13 7 - 40 U/L    AST (SGOT) 12 0 - 33 U/L    Alkaline Phosphatase 60 25 - 100 U/L    Total Bilirubin 0.3 0.3 - 1.2 mg/dL    eGFR Non African Amer 83 >60 mL/min/1.73    Globulin 2.8 gm/dL    A/G Ratio 1.5 1.5 - 2.5 g/dL    BUN/Creatinine Ratio 9.9 7.0 - 25.0    Anion Gap 0.0 (L) 3.0 - 11.0 mmol/L   Lipase    Collection Time: 08/07/18 12:23 PM   Result Value Ref Range    Lipase 15 6 - 51 U/L   Light Blue Top    Collection Time: 08/07/18 12:23 PM   Result Value Ref Range    Extra Tube hold for add-on    Green Top (Gel)    Collection Time: 08/07/18 12:23 PM   Result Value Ref Range    Extra Tube Hold for add-ons.    Lavender Top    Collection Time: 08/07/18 12:23 PM   Result Value Ref Range    Extra Tube hold for  add-on    Gold Top - SST    Collection Time: 08/07/18 12:23 PM   Result Value Ref Range    Extra Tube Hold for add-ons.    CBC Auto Differential    Collection Time: 08/07/18 12:23 PM   Result Value Ref Range    WBC 11.02 (H) 3.50 - 10.80 10*3/mm3    RBC 4.73 4.20 - 5.76 10*6/mm3    Hemoglobin 15.3 13.1 - 17.5 g/dL    Hematocrit 46.1 38.9 - 50.9 %    MCV 97.5 80.0 - 99.0 fL    MCH 32.3 (H) 27.0 - 31.0 pg    MCHC 33.2 32.0 - 36.0 g/dL    RDW 13.6 11.3 - 14.5 %    RDW-SD 48.7 37.0 - 54.0 fl    MPV 9.8 6.0 - 12.0 fL    Platelets 310 150 - 450 10*3/mm3    Neutrophil % 56.8 41.0 - 71.0 %    Lymphocyte % 31.8 24.0 - 44.0 %    Monocyte % 7.9 0.0 - 12.0 %    Eosinophil % 3.2 (H) 0.0 - 3.0 %    Basophil % 0.3 0.0 - 1.0 %    Immature Grans % 0.4 0.0 - 0.6 %    Neutrophils, Absolute 6.27 1.50 - 8.30 10*3/mm3    Lymphocytes, Absolute 3.50 0.60 - 4.80 10*3/mm3    Monocytes, Absolute 0.87 0.00 - 1.00 10*3/mm3    Eosinophils, Absolute 0.35 (H) 0.00 - 0.30 10*3/mm3    Basophils, Absolute 0.03 0.00 - 0.20 10*3/mm3    Immature Grans, Absolute 0.04 (H) 0.00 - 0.03 10*3/mm3   POC Troponin, Rapid    Collection Time: 08/07/18 12:28 PM   Result Value Ref Range    Troponin I 0.00 0.00 - 0.07 ng/mL   Urinalysis With Microscopic If Indicated (No Culture) - Urine, Clean Catch    Collection Time: 08/07/18 12:44 PM   Result Value Ref Range    Color, UA Yellow Yellow, Straw    Appearance, UA Clear Clear    pH, UA 6.5 5.0 - 8.0    Specific Gravity, UA <=1.005 1.001 - 1.030    Glucose, UA Negative Negative    Ketones, UA Negative Negative    Bilirubin, UA Negative Negative    Blood, UA Negative Negative    Protein, UA Negative Negative    Leuk Esterase, UA Negative Negative    Nitrite, UA Negative Negative    Urobilinogen, UA 0.2 E.U./dL 0.2 - 1.0 E.U./dL     Note: In addition to lab results from this visit, the labs listed above may include labs taken at another facility or during a different encounter within the last 24 hours. Please correlate  lab times with ED admission and discharge times for further clarification of the services performed during this visit.    CT Abdomen Pelvis With Contrast   Final Result   1. Mildly increased bowel gas in a nonspecific pattern. No high-grade   ileus or obstruction is seen. Consider follow-up imaging if patient's   symptoms persist or worsen.   2. Irregular but generally bland appearing calcified purely exophytic   cyst left kidney. Particularly if there is any history of hematuria,   follow-up imaging might be considered to evaluate this for stability   over time.   3. 15 x 7 mm intraluminal lipoma of the distal duodenum with no evidence   of obstruction or other associated abnormality.       D:  08/07/2018   E:  08/07/2018       This report was finalized on 8/7/2018 10:11 PM by DR. David Langley MD.          XR Chest 1 View   Final Result   No evidence of active chest disease.       D:  08/07/2018   E:  08/07/2018       This report was finalized on 8/7/2018 8:57 PM by DR. David Langley MD.            Vitals:    08/07/18 1300 08/07/18 1330 08/07/18 1400 08/07/18 1508   BP: 125/80 124/79 133/84 152/96   BP Location:       Patient Position:       Pulse: 60 63 70 63   Resp: 16 16 16 16   Temp:       TempSrc:       SpO2: 94% 95% 97% 96%   Weight:       Height:         Medications   famotidine (PEPCID) injection 20 mg (20 mg Intravenous Given 8/7/18 1244)   ondansetron (ZOFRAN) injection 4 mg (4 mg Intravenous Given 8/7/18 1244)   iopamidol (ISOVUE-300) 61 % injection 100 mL (80 mL Intravenous Given 8/7/18 1425)   aluminum-magnesium hydroxide-simethicone (MAALOX MAX) 400-400-40 MG/5ML suspension 15 mL (15 mL Oral Given 8/7/18 1633)   lidocaine viscous (XYLOCAINE) 2 % mouth solution 15 mL (15 mL Mouth/Throat Given 8/7/18 1633)     ECG/EMG Results (last 24 hours)     ** No results found for the last 24 hours. **                [facility]      Final diagnoses:   Pain of upper abdomen            Lucy Quick, APRN  08/07/18 [id number]

## 2019-01-02 RX ORDER — POTASSIUM CHLORIDE 750 MG/1
TABLET, FILM COATED, EXTENDED RELEASE ORAL
Qty: 30 TABLET | Refills: 5 | Status: SHIPPED | OUTPATIENT
Start: 2019-01-02 | End: 2019-09-24 | Stop reason: SDUPTHER

## 2019-04-10 ENCOUNTER — CLINICAL SUPPORT (OUTPATIENT)
Dept: CARDIOLOGY | Facility: CLINIC | Age: 69
End: 2019-04-10

## 2019-04-10 VITALS
OXYGEN SATURATION: 96 % | HEIGHT: 67 IN | HEART RATE: 86 BPM | WEIGHT: 169.4 LBS | SYSTOLIC BLOOD PRESSURE: 125 MMHG | DIASTOLIC BLOOD PRESSURE: 86 MMHG | BODY MASS INDEX: 26.59 KG/M2

## 2019-04-10 PROCEDURE — 99211 OFF/OP EST MAY X REQ PHY/QHP: CPT | Performed by: PHYSICIAN ASSISTANT

## 2019-04-10 RX ORDER — AMLODIPINE BESYLATE 5 MG/1
5 TABLET ORAL DAILY
Qty: 30 TABLET | Refills: 11 | Status: SHIPPED | OUTPATIENT
Start: 2019-04-10 | End: 2020-06-17 | Stop reason: SDUPTHER

## 2019-04-10 RX ORDER — RANITIDINE 150 MG/1
TABLET ORAL DAILY
COMMUNITY
Start: 2019-02-04 | End: 2019-10-17

## 2019-04-10 RX ORDER — OMEPRAZOLE 20 MG/1
CAPSULE, DELAYED RELEASE ORAL DAILY
COMMUNITY
Start: 2019-02-04

## 2019-04-10 RX ORDER — LISINOPRIL 20 MG/1
TABLET ORAL 2 TIMES DAILY
COMMUNITY
Start: 2019-04-04 | End: 2019-11-25 | Stop reason: SDUPTHER

## 2019-04-10 RX ORDER — ALBUTEROL SULFATE 90 UG/1
AEROSOL, METERED RESPIRATORY (INHALATION) AS NEEDED
COMMUNITY
Start: 2019-03-10

## 2019-04-10 RX ORDER — NITROGLYCERIN 0.4 MG/1
0.4 TABLET SUBLINGUAL
Qty: 25 TABLET | Refills: 11 | Status: SHIPPED | OUTPATIENT
Start: 2019-04-10 | End: 2020-06-17 | Stop reason: SDUPTHER

## 2019-04-10 NOTE — PROGRESS NOTES
Aly Gabriel III  1950  4/10/2019   ?   Chief Complaint   Patient presents with   • Hypertension     presents as nurse visit for BP check      ?   HPI:   ?   ? Patient's wife called today stating patient was seen in the ER overnight for HTN, per Delta Shah PA-C patient to been seen as nurse visit for BP check. Patient's BP when arriving at [facility] was 188/115. Patient was observed for a few hours in the ER and released when BP went down to 137/72. Patient stated he wasn't given anything for his blood pressure or had any medication changes while at the hospital. He denies chest pain, palpitations and shortness of breath. BP currently 125/86 however states in the evening his BP will increase. Loretta Lyle MA    ?     Current Outpatient Medications:   •  aspirin 81 MG tablet, Take 1 tablet by mouth Daily., Disp: 30 tablet, Rfl: 11  •  atorvastatin (LIPITOR) 40 MG tablet, Take 1 tablet by mouth Daily., Disp: 30 tablet, Rfl: 11  •  azelastine (ASTELIN) 0.1 % nasal spray, , Disp: , Rfl:   •  clopidogrel (PLAVIX) 75 MG tablet, Take 1 tablet by mouth Daily., Disp: 30 tablet, Rfl: 11  •  docusate sodium (COLACE) 100 MG capsule, Take 200 mg by mouth 2 (Two) Times a Day., Disp: , Rfl:   •  furosemide (LASIX) 40 MG tablet, Take 1 tablet by mouth Daily., Disp: 30 tablet, Rfl: 11  •  HYDROcodone-acetaminophen (NORCO) 5-325 MG per tablet, Take 1 tablet by mouth Every 6 (Six) Hours As Needed for Moderate Pain (4-6)., Disp: , Rfl:   •  lisinopril (PRINIVIL,ZESTRIL) 20 MG tablet, Daily., Disp: , Rfl:   •  metoclopramide (REGLAN) 10 MG tablet, Daily., Disp: , Rfl:   •  metoprolol tartrate (LOPRESSOR) 25 MG tablet, Daily., Disp: , Rfl:   •  nitroglycerin (NITROSTAT) 0.4 MG SL tablet, 1 under the tongue as needed for angina, may repeat q5mins for up three doses (Patient taking differently: Place 0.4 mg under the tongue Every 5 (Five) Minutes As Needed for Chest Pain. 1 under the tongue as needed for angina, may repeat q5mins for  up three doses), Disp: 100 tablet, Rfl: 11  •  NYSTATIN PO, Take  by mouth., Disp: , Rfl:   •  omeprazole (priLOSEC) 20 MG capsule, Daily., Disp: , Rfl:   •  pantoprazole (PROTONIX) 40 MG EC tablet, Take 1 tablet by mouth Daily., Disp: 30 tablet, Rfl: 6  •  potassium chloride (K-DUR) 10 MEQ CR tablet, TAKE ONE TABLET BY MOUTH DAILY, Disp: 30 tablet, Rfl: 5  •  raNITIdine (ZANTAC) 150 MG tablet, Daily., Disp: , Rfl:   •  ranolazine (RANEXA) 500 MG 12 hr tablet, Take 1 tablet by mouth Every 12 (Twelve) Hours., Disp: 60 tablet, Rfl: 6  •  VENTOLIN  (90 Base) MCG/ACT inhaler, As Needed., Disp: , Rfl:   No current facility-administered medications for this visit.     Facility-Administered Medications Ordered in Other Visits:   •  Chlorhexidine Gluconate Cloth 2 % pads 1 application, 1 application, Topical, Q12H PRN, Aime Lancaster PA   ?   ?   Patient has no known allergies.       Procedures     ?   Assessment/Plan    ?   ?  1. Hypertension    Vitals/symptoms and medications reviewed by Delta Shah PA-C. Verbal order per Delta hSah PA-C for Amlodipine 5 mg in the evening. Patient will keep follow up scheduled for 6/17/19 and call with any questions, concerns or worsening symptoms. Loretta Lyle MA    ?

## 2019-04-15 RX ORDER — CLOPIDOGREL BISULFATE 75 MG/1
TABLET ORAL
Qty: 30 TABLET | Refills: 10 | Status: SHIPPED | OUTPATIENT
Start: 2019-04-15 | End: 2019-09-24 | Stop reason: SDUPTHER

## 2019-04-29 ENCOUNTER — TELEPHONE (OUTPATIENT)
Dept: CARDIOLOGY | Facility: CLINIC | Age: 69
End: 2019-04-29

## 2019-04-29 NOTE — TELEPHONE ENCOUNTER
DR. JOANN [facility]RETT D[facility] PRACTICE FAXED CARDIAC CLEARANCE REQUEST (TEETH EXTRACTIONS) NOT YET SCHEDULED PENDING APT. 6/17/19 WITH NEHEMIAH BURTON PA-C.  CLARISSA,CMA

## 2019-06-03 RX ORDER — FUROSEMIDE 40 MG/1
TABLET ORAL
Qty: 30 TABLET | Refills: 10 | Status: SHIPPED | OUTPATIENT
Start: 2019-06-03 | End: 2020-07-30

## 2019-07-21 DIAGNOSIS — E78.2 MIXED HYPERLIPIDEMIA: ICD-10-CM

## 2019-07-22 RX ORDER — ATORVASTATIN CALCIUM 40 MG/1
TABLET, FILM COATED ORAL
Qty: 14 TABLET | Refills: 0 | Status: SHIPPED | OUTPATIENT
Start: 2019-07-22 | End: 2019-09-24 | Stop reason: SDUPTHER

## 2019-09-24 DIAGNOSIS — E78.2 MIXED HYPERLIPIDEMIA: ICD-10-CM

## 2019-09-24 RX ORDER — CLOPIDOGREL BISULFATE 75 MG/1
75 TABLET ORAL DAILY
Qty: 30 TABLET | Refills: 0 | Status: SHIPPED | OUTPATIENT
Start: 2019-09-24 | End: 2020-05-08

## 2019-09-24 RX ORDER — AMLODIPINE BESYLATE 5 MG/1
5 TABLET ORAL DAILY
Qty: 30 TABLET | Refills: 11 | Status: CANCELLED | OUTPATIENT
Start: 2019-09-24

## 2019-09-24 RX ORDER — POTASSIUM CHLORIDE 750 MG/1
10 TABLET, FILM COATED, EXTENDED RELEASE ORAL DAILY
Qty: 30 TABLET | Refills: 2 | Status: CANCELLED | OUTPATIENT
Start: 2019-09-24

## 2019-09-24 RX ORDER — ATORVASTATIN CALCIUM 40 MG/1
40 TABLET, FILM COATED ORAL DAILY
Qty: 30 TABLET | Refills: 0 | Status: SHIPPED | OUTPATIENT
Start: 2019-09-24 | End: 2019-11-03 | Stop reason: SDUPTHER

## 2019-09-24 RX ORDER — RANOLAZINE 500 MG/1
500 TABLET, EXTENDED RELEASE ORAL EVERY 12 HOURS SCHEDULED
Qty: 60 TABLET | Refills: 6 | Status: CANCELLED | OUTPATIENT
Start: 2019-09-24

## 2019-09-24 RX ORDER — POTASSIUM CHLORIDE 750 MG/1
10 TABLET, FILM COATED, EXTENDED RELEASE ORAL DAILY
Qty: 30 TABLET | Refills: 0 | Status: SHIPPED | OUTPATIENT
Start: 2019-09-24 | End: 2019-11-03 | Stop reason: SDUPTHER

## 2019-09-24 NOTE — TELEPHONE ENCOUNTER
Spoke to patient's wife/SUSANNE, she states he needs refills of Lipitor, Plavix and potassium sent to [facility]. Follow up moved to opening on 10/17/19. Refill sent as requested. PHILLY Duncan MA      ----- Message from Sary Sharpe sent at 9/24/2019  3:50 PM EDT -----  Pt needs refills.    Medication: ALL MEDS NEED REFILL RACHAEL'JUAN RAMON WITH NEHEMIAH 12/2/19    [facility]

## 2019-10-17 ENCOUNTER — OFFICE VISIT (OUTPATIENT)
Dept: CARDIOLOGY | Facility: CLINIC | Age: 69
End: 2019-10-17

## 2019-10-17 VITALS
HEART RATE: 88 BPM | BODY MASS INDEX: 27.06 KG/M2 | SYSTOLIC BLOOD PRESSURE: 106 MMHG | DIASTOLIC BLOOD PRESSURE: 70 MMHG | OXYGEN SATURATION: 95 % | WEIGHT: 172.4 LBS | HEIGHT: 67 IN

## 2019-10-17 DIAGNOSIS — I10 ESSENTIAL HYPERTENSION: Primary | ICD-10-CM

## 2019-10-17 DIAGNOSIS — R06.02 SOB (SHORTNESS OF BREATH): ICD-10-CM

## 2019-10-17 DIAGNOSIS — R07.9 CHEST PAIN, UNSPECIFIED TYPE: ICD-10-CM

## 2019-10-17 DIAGNOSIS — R00.2 PALPITATIONS: ICD-10-CM

## 2019-10-17 PROCEDURE — 99214 OFFICE O/P EST MOD 30 MIN: CPT | Performed by: PHYSICIAN ASSISTANT

## 2019-10-17 PROCEDURE — 93000 ELECTROCARDIOGRAM COMPLETE: CPT | Performed by: PHYSICIAN ASSISTANT

## 2019-10-17 RX ORDER — RANOLAZINE 1000 MG/1
1000 TABLET, EXTENDED RELEASE ORAL EVERY 12 HOURS SCHEDULED
Qty: 60 TABLET | Refills: 6 | Status: SHIPPED | OUTPATIENT
Start: 2019-10-17 | End: 2022-01-01 | Stop reason: ALTCHOICE

## 2019-10-17 NOTE — PROGRESS NOTES
Problem list     Subjective   Aly Gabriel III is a 69 y.o. male     Chief Complaint   Patient presents with   • Chest Pain     presents for f/u   • Palpitations   • Shortness of Breath     Problem list     1. Three-vessel coronary artery disease  1.1 cardiac catheterization May 2017 because of inferobasal, diaphragmatic, and distal lateral ischemia demonstrating severe three-vessel disease with recommendations for mechanical revascularization.   1.2 Coronary artery bypass grafting with LIMA to LAD, vein graft to OM, vein graft to PDA and posterolateral branch by Dr. Ronal Anthony June 2017   1.3 stress test April 2018 demonstrates no evidence of ischemia and preserved LV function  1.4 cardiac catheterization May 2018 with patent LIMA and vein graft to circumflex with 90% disease in the vein graft to the RCA.  Stenting performed and nonobstructive disease otherwise  2. Preserved systolic function  2.1 Echo 8/7/17-mild LVH, EF 50-55%, diastolic dysfunction 2, basal inferoseptal, and apical septal wall segments are hypokinetic, mid inferoseptal wall segment is akinetic, mild MR, trace TR and AR, PA 30-35  3. CVA  3.1 CVA in 2015 [place], [place], with residual right arm paralysis and dysarthria  3.2 carotid endarterectomy of the right internal carotid artery 2015 and stenting of the left internal carotid artery  3.3 CTA of the carotids June 2017 demonstrated no evidence of hemodynamically significant stenosis   4. Hypertension  5. Dyslipidemia history.   6.  Mild mitral and tricuspid regurgitation    HPI    Patient is a 69-year-old male who presents back to the office for follow-up.  Patient is accompanied by wife.  Because of patient's previous CVA, patient's wife helps in regards to history.  He has been having chest discomfort but does not characterize it.  It does not appear to be as severe as what he had felt in the past.  Dyspnea is mild no progressive shortness of breath.  He does not have any failure  symptoms.  He does not seem to have any palpitations or dysrhythmic symptoms and is doing well otherwise      Outpatient Encounter Medications as of 10/17/2019   Medication Sig Dispense Refill   • amLODIPine (NORVASC) 5 MG tablet Take 1 tablet by mouth Daily. 30 tablet 11   • aspirin 81 MG tablet Take 1 tablet by mouth Daily. 30 tablet 11   • atorvastatin (LIPITOR) 40 MG tablet Take 1 tablet by mouth Daily. 30 tablet 0   • azelastine (ASTELIN) 0.1 % nasal spray      • clopidogrel (PLAVIX) 75 MG tablet Take 1 tablet by mouth Daily. 30 tablet 0   • docusate sodium (COLACE) 100 MG capsule Take 200 mg by mouth 2 (Two) Times a Day.     • furosemide (LASIX) 40 MG tablet TAKE ONE TABLET BY MOUTH DAILY 30 tablet 10   • HYDROcodone-acetaminophen (NORCO) 5-325 MG per tablet Take 1 tablet by mouth Every 6 (Six) Hours As Needed for Moderate Pain (4-6).     • lisinopril (PRINIVIL,ZESTRIL) 20 MG tablet 2 (Two) Times a Day.     • metoclopramide (REGLAN) 10 MG tablet Daily.     • metoprolol tartrate (LOPRESSOR) 25 MG tablet Daily.     • nitroglycerin (NITROSTAT) 0.4 MG SL tablet Place 1 tablet under the tongue Every 5 (Five) Minutes As Needed for Chest Pain (chest pain). 25 tablet 11   • NYSTATIN PO Take  by mouth.     • omeprazole (priLOSEC) 20 MG capsule Daily.     • pantoprazole (PROTONIX) 40 MG EC tablet Take 1 tablet by mouth Daily. 30 tablet 6   • potassium chloride (K-DUR) 10 MEQ CR tablet Take 1 tablet by mouth Daily. 30 tablet 0   • ranolazine (RANEXA) 1000 MG 12 hr tablet Take 1 tablet by mouth Every 12 (Twelve) Hours. 60 tablet 6   • VENTOLIN  (90 Base) MCG/ACT inhaler As Needed.     • [DISCONTINUED] ranolazine (RANEXA) 500 MG 12 hr tablet Take 1 tablet by mouth Every 12 (Twelve) Hours. 60 tablet 6   • [DISCONTINUED] raNITIdine (ZANTAC) 150 MG tablet Daily.       Facility-Administered Encounter Medications as of 10/17/2019   Medication Dose Route Frequency Provider Last Rate Last Dose   • Chlorhexidine Gluconate  Cloth 2 % pads 1 application  1 application Topical Q12H PRN Aime Lancaster PA           Patient has no known allergies.    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   • Wears glasses    • Wears partial dentures        Social History     Socioeconomic History   • Marital status:      Spouse name: Not on file   • Number of children: 0   • Years of education: Not on file   • Highest education level: Not on file   Occupational History   • Occupation:      Employer: RETIRED   Tobacco Use   • Smoking status: Former Smoker     Packs/day: 2.00     Years: 30.00     Pack years: 60.00     Types: Cigarettes     Last attempt to quit: 2014     Years since quittin.3   • Smokeless tobacco: Never Used   Substance and Sexual Activity   • Alcohol use: No   • Drug use: No   • Sexual activity: Yes     Partners: Female     Comment: spouse       Family History   Problem Relation Age of Onset   • Heart disease Mother    • Heart disease Father        Review of Systems   Constitutional: Positive for diaphoresis (cold sweat when walking) and fatigue.   HENT: Negative.         Burning mouth syndrome   Eyes: Positive for visual disturbance.   Respiratory: Positive for shortness of breath (with daily activity especially when walking).    Cardiovascular: Positive for chest pain and palpitations. Negative for leg swelling.   Gastrointestinal: Negative.    Endocrine: Negative.    Genitourinary: Negative.    Musculoskeletal: Positive for arthralgias, back pain and neck pain.   Skin: Negative.    Allergic/Immunologic: Negative.    Neurological: Positive for dizziness  "(occas).   Hematological: Bruises/bleeds easily.   Psychiatric/Behavioral: Negative.    All other systems reviewed and are negative.      Objective   Vitals:    10/17/19 1413   BP: 106/70   BP Location: Left arm   Patient Position: Sitting   Pulse: 88   SpO2: 95%   Weight: 78.2 kg (172 lb 6.4 oz)   Height: 170.2 cm (67.01\")      /70 (BP Location: Left arm, Patient Position: Sitting)   Pulse 88   Ht 170.2 cm (67.01\")   Wt 78.2 kg (172 lb 6.4 oz)   SpO2 95%   BMI 27.00 kg/m²     Lab Results (most recent)     None          Physical Exam   Constitutional: He is oriented to person, place, and time. He appears well-developed and well-nourished. No distress.   HENT:   Head: Normocephalic and atraumatic.   Eyes: EOM are normal. Pupils are equal, round, and reactive to light.   Neck: No JVD present.   Cardiovascular: Normal rate, regular rhythm, normal heart sounds and intact distal pulses. Exam reveals no gallop and no friction rub.   No murmur heard.  Grade 1/6 systolic murmur left upper sternal border   Pulmonary/Chest: Effort normal and breath sounds normal. No respiratory distress. He has no wheezes. He has no rales. He exhibits no tenderness.   Musculoskeletal: Normal range of motion. He exhibits no edema.   Neurological: He is alert and oriented to person, place, and time. No cranial nerve deficit.   Skin: Skin is warm and dry. No rash noted. No erythema. No pallor.   Psychiatric: He has a normal mood and affect. His behavior is normal.   Nursing note and vitals reviewed.      Procedure     ECG 12 Lead  Date/Time: 10/17/2019 2:17 PM  Performed by: Delta Shah PA  Authorized by: Delta Shah PA   Comparison: compared with previous ECG from 8/7/2018  Comments: EKG demonstrates sinus rhythm at 85 bpm, inferior wall margin determined, nonspecific ST-T wave changes               Assessment/Plan     Problems Addressed this Visit        Cardiovascular and Mediastinum    Palpitations    Relevant Orders "    ECG 12 Lead    Adult Transthoracic Echo Complete W/ Cont if Necessary Per Protocol    Stress Test With Myocardial Perfusion One Day    Essential hypertension - Primary    Relevant Orders    ECG 12 Lead    Adult Transthoracic Echo Complete W/ Cont if Necessary Per Protocol    Stress Test With Myocardial Perfusion One Day       Nervous and Auditory    Chest pain    Relevant Orders    ECG 12 Lead    Adult Transthoracic Echo Complete W/ Cont if Necessary Per Protocol    Stress Test With Myocardial Perfusion One Day      Other Visit Diagnoses     SOB (shortness of breath)        Relevant Orders    ECG 12 Lead    Adult Transthoracic Echo Complete W/ Cont if Necessary Per Protocol    Stress Test With Myocardial Perfusion One Day              Recommendation  1.  Patient with complaints of chest pain and shortness of breath.  Because we cannot obtain any specific history, feel will stratification is warranted.  2.  We will schedule for stress testing.  Echocardiogram to evaluate LV structure and function and evaluate valvular disease.  3.  I am increasing Ranexa to thousand milligrams twice a day to help with his discomfort.  I want to see him back for follow-up after testing.  Will follow with primary as scheduled              Patient's Body mass index is 27 kg/m². BMI is within normal parameters. No follow-up required..       Electronically signed by:

## 2019-11-03 DIAGNOSIS — E78.2 MIXED HYPERLIPIDEMIA: ICD-10-CM

## 2019-11-04 RX ORDER — POTASSIUM CHLORIDE 750 MG/1
TABLET, FILM COATED, EXTENDED RELEASE ORAL
Qty: 30 TABLET | Refills: 5 | Status: SHIPPED | OUTPATIENT
Start: 2019-11-04 | End: 2022-01-01 | Stop reason: SDUPTHER

## 2019-11-04 RX ORDER — ATORVASTATIN CALCIUM 40 MG/1
TABLET, FILM COATED ORAL
Qty: 30 TABLET | Refills: 5 | Status: SHIPPED | OUTPATIENT
Start: 2019-11-04 | End: 2020-04-10

## 2019-11-12 ENCOUNTER — TELEPHONE (OUTPATIENT)
Dept: CARDIOLOGY | Facility: CLINIC | Age: 69
End: 2019-11-12

## 2019-11-15 ENCOUNTER — TELEPHONE (OUTPATIENT)
Dept: CARDIOLOGY | Facility: CLINIC | Age: 69
End: 2019-11-15

## 2019-11-15 NOTE — TELEPHONE ENCOUNTER
MRS. PATEL CALLED INQUIRING ABOUT CARDIAC CLEARANCE FOR MR. PATEL TO HAVE TEETH EXTRACTED.  THE PM INQUIRED IF THE PT HAS BEEN WAITING SINCE THE INITIAL CLEARANCE REQUEST IN April; SHE STATED THEY HADN'T, BOTH HER AND MR. PATEL WERE EXPERIENCING MULTIPLE ISSUES WHICH DELAYED THE PROCEDURE.  THE PM WILL LOOK FOR CLEARANCE REQUEST AND IT OUR OFFICE HASN'T RECEIVED, I WILL CONTACT DR. BARRETT TO REQUEST IT TO BE FAXED.

## 2019-11-15 NOTE — TELEPHONE ENCOUNTER
PER DR. RAMOS'S OFFICE THE PT MUST BE SEEN AGAIN PRIOR TO THEM FAXING A CARDIAC CLEARANCE REQUEST.  THE PM CONTACTED MRS. PATEL AND MADE HER AWARE.

## 2019-11-18 NOTE — TELEPHONE ENCOUNTER
Received clearance request from Dr. Cooper for pt to undergo dental extractions. Information given to Kristine Young MA.

## 2019-11-19 ENCOUNTER — TELEPHONE (OUTPATIENT)
Dept: CARDIOLOGY | Facility: CLINIC | Age: 69
End: 2019-11-19

## 2019-11-19 NOTE — TELEPHONE ENCOUNTER
CARDIAC CLEARANCE REQUEST FROM DR. BARRETT'S OFFICE FOR DENTAL EXTRACTION AND PLAVIX RECOMMENDATIONS REQUESTED. CALLED AND SPOKE WITH PATIENT'S WIFE TO SEE IF  HAS BEEN HAVING ANY CHEST PAIN, SHORTNESS OF BREATH ETC. SINCE DID NOT HAVE CARDIAC WORK-UP ORDERED DONE PRIOR TO GRANTING CLEARANCE PER [facility] NP. WIFE STATES HE AHS NOT HAD ANY C/P OR SHORTNESS OF BREATH, CONFIRMED THIS WITH HER SEVERAL TIMES, AND SHE JENIFER IT. STATES THEY WILL CALL BACK AND SCHEDULE ORDERED TESTING ONCE EXTRACTION DONE. PER NOTE ON CLEARANCE LETTER IF NON-SYMPTOMATIC , CLEARED, CONTINUE ASA AND MAY HOLD PLAVIX 5 DAYS. RELAYED THIS TO WIFE AND CARDIAC CLEARANCE LETTER FAXED TO DR. BARRETT'S OFFICE. PH,LPN

## 2019-11-25 RX ORDER — LISINOPRIL 20 MG/1
20 TABLET ORAL 2 TIMES DAILY
Qty: 60 TABLET | Refills: 11 | Status: SHIPPED | OUTPATIENT
Start: 2019-11-25 | End: 2020-10-23 | Stop reason: SDUPTHER

## 2020-04-10 DIAGNOSIS — E78.2 MIXED HYPERLIPIDEMIA: ICD-10-CM

## 2020-04-10 RX ORDER — ATORVASTATIN CALCIUM 40 MG/1
TABLET, FILM COATED ORAL
Qty: 90 TABLET | Refills: 0 | Status: SHIPPED | OUTPATIENT
Start: 2020-04-10 | End: 2020-06-02

## 2020-04-10 RX ORDER — POTASSIUM CHLORIDE 750 MG/1
TABLET, EXTENDED RELEASE ORAL
Qty: 90 TABLET | Refills: 0 | Status: SHIPPED | OUTPATIENT
Start: 2020-04-10 | End: 2020-06-02

## 2020-04-28 ENCOUNTER — TELEPHONE (OUTPATIENT)
Dept: CARDIOLOGY | Facility: CLINIC | Age: 70
End: 2020-04-28

## 2020-04-28 NOTE — TELEPHONE ENCOUNTER
Per Delta, pt needs to do his stress test at minimum, before he can be cleared.     Faxed clearance back stating the above.

## 2020-05-07 ENCOUNTER — HOSPITAL ENCOUNTER (OUTPATIENT)
Dept: CARDIOLOGY | Facility: HOSPITAL | Age: 70
Discharge: HOME | End: 2020-05-07

## 2020-05-07 DIAGNOSIS — I10 ESSENTIAL HYPERTENSION: ICD-10-CM

## 2020-05-07 DIAGNOSIS — R06.02 SOB (SHORTNESS OF BREATH): ICD-10-CM

## 2020-05-07 DIAGNOSIS — R00.2 PALPITATIONS: ICD-10-CM

## 2020-05-07 DIAGNOSIS — R07.9 CHEST PAIN, UNSPECIFIED TYPE: ICD-10-CM

## 2020-05-07 PROCEDURE — 93018 CV STRESS TEST I&R ONLY: CPT | Performed by: INTERNAL MEDICINE

## 2020-05-07 PROCEDURE — 93017 CV STRESS TEST TRACING ONLY: CPT

## 2020-05-07 PROCEDURE — A9500 TC99M SESTAMIBI: HCPCS | Performed by: INTERNAL MEDICINE

## 2020-05-07 PROCEDURE — 0 TECHNETIUM SESTAMIBI: Performed by: INTERNAL MEDICINE

## 2020-05-07 PROCEDURE — 78452 HT MUSCLE IMAGE SPECT MULT: CPT

## 2020-05-07 PROCEDURE — 93016 CV STRESS TEST SUPVJ ONLY: CPT | Performed by: NURSE PRACTITIONER

## 2020-05-07 PROCEDURE — 78452 HT MUSCLE IMAGE SPECT MULT: CPT | Performed by: INTERNAL MEDICINE

## 2020-05-07 PROCEDURE — 25010000002 REGADENOSON 0.4 MG/5ML SOLUTION: Performed by: INTERNAL MEDICINE

## 2020-05-07 RX ADMIN — REGADENOSON 0.4 MG: 0.08 INJECTION, SOLUTION INTRAVENOUS at 10:16

## 2020-05-07 RX ADMIN — TECHNETIUM TC 99M SESTAMIBI 1 DOSE: 1 INJECTION INTRAVENOUS at 10:16

## 2020-05-07 RX ADMIN — TECHNETIUM TC 99M SESTAMIBI 1 DOSE: 1 INJECTION INTRAVENOUS at 09:59

## 2020-05-08 LAB
BH CV STRESS COMMENTS STAGE 1: NORMAL
BH CV STRESS DOSE REGADENOSON STAGE 1: 0.4
BH CV STRESS DURATION MIN STAGE 1: 0
BH CV STRESS DURATION SEC STAGE 1: 10
BH CV STRESS PROTOCOL 1: NORMAL
BH CV STRESS RECOVERY BP: NORMAL MMHG
BH CV STRESS RECOVERY HR: 82 BPM
BH CV STRESS STAGE 1: 1
MAXIMAL PREDICTED HEART RATE: 149 BPM
PERCENT MAX PREDICTED HR: 70.47 %
STRESS BASELINE BP: NORMAL MMHG
STRESS BASELINE HR: 66 BPM
STRESS PERCENT HR: 83 %
STRESS POST PEAK BP: NORMAL MMHG
STRESS POST PEAK HR: 105 BPM
STRESS TARGET HR: 127 BPM

## 2020-05-08 RX ORDER — CLOPIDOGREL BISULFATE 75 MG/1
TABLET ORAL
Qty: 90 TABLET | Refills: 1 | Status: SHIPPED | OUTPATIENT
Start: 2020-05-08 | End: 2020-10-23

## 2020-05-12 ENCOUNTER — TELEPHONE (OUTPATIENT)
Dept: CARDIOLOGY | Facility: CLINIC | Age: 70
End: 2020-05-12

## 2020-05-13 ENCOUNTER — TELEPHONE (OUTPATIENT)
Dept: CARDIOLOGY | Facility: CLINIC | Age: 70
End: 2020-05-13

## 2020-05-13 NOTE — TELEPHONE ENCOUNTER
Received clearance from [facility] for prostate biopsy. Patient will need to stop Plavix x 5 days, due to temporarily stopping Plavix, patient will need to start taking ASA 81 mg po daily. Left message for patient to call our office. Oma Hay LPN

## 2020-05-13 NOTE — TELEPHONE ENCOUNTER
Spoke with patients spouse, informed her patient needed to take Aspirin 81 mg daily. Spouse verbalized understanding. Stated she would get OTC. Oma Hay LPN

## 2020-06-02 DIAGNOSIS — E78.2 MIXED HYPERLIPIDEMIA: ICD-10-CM

## 2020-06-02 RX ORDER — POTASSIUM CHLORIDE 750 MG/1
TABLET, EXTENDED RELEASE ORAL
Qty: 90 TABLET | Refills: 4 | Status: SHIPPED | OUTPATIENT
Start: 2020-06-02 | End: 2021-06-14

## 2020-06-02 RX ORDER — ATORVASTATIN CALCIUM 40 MG/1
TABLET, FILM COATED ORAL
Qty: 90 TABLET | Refills: 4 | Status: SHIPPED | OUTPATIENT
Start: 2020-06-02 | End: 2021-07-06

## 2020-06-17 ENCOUNTER — OFFICE VISIT (OUTPATIENT)
Dept: CARDIOLOGY | Facility: CLINIC | Age: 70
End: 2020-06-17

## 2020-06-17 VITALS
WEIGHT: 169.2 LBS | BODY MASS INDEX: 26.56 KG/M2 | HEIGHT: 67 IN | HEART RATE: 80 BPM | SYSTOLIC BLOOD PRESSURE: 179 MMHG | DIASTOLIC BLOOD PRESSURE: 100 MMHG | OXYGEN SATURATION: 96 % | TEMPERATURE: 96.9 F

## 2020-06-17 DIAGNOSIS — E78.5 DYSLIPIDEMIA: ICD-10-CM

## 2020-06-17 DIAGNOSIS — M79.10 MYALGIA: Primary | ICD-10-CM

## 2020-06-17 DIAGNOSIS — I10 ESSENTIAL HYPERTENSION: ICD-10-CM

## 2020-06-17 DIAGNOSIS — I25.10 CORONARY ARTERY DISEASE INVOLVING NATIVE CORONARY ARTERY OF NATIVE HEART WITHOUT ANGINA PECTORIS: ICD-10-CM

## 2020-06-17 PROCEDURE — 99214 OFFICE O/P EST MOD 30 MIN: CPT | Performed by: PHYSICIAN ASSISTANT

## 2020-06-17 RX ORDER — AMLODIPINE BESYLATE 5 MG/1
5 TABLET ORAL 2 TIMES DAILY
Qty: 60 TABLET | Refills: 5 | Status: SHIPPED | OUTPATIENT
Start: 2020-06-17 | End: 2022-01-01 | Stop reason: SDDI

## 2020-06-17 RX ORDER — CLONIDINE HYDROCHLORIDE 0.1 MG/1
0.1 TABLET ORAL 3 TIMES DAILY PRN
Qty: 30 TABLET | Refills: 11 | Status: SHIPPED | OUTPATIENT
Start: 2020-06-17 | End: 2021-02-23 | Stop reason: SDUPTHER

## 2020-06-17 RX ORDER — METAXALONE 800 MG/1
800 TABLET ORAL NIGHTLY PRN
Qty: 30 TABLET | Refills: 5 | Status: SHIPPED | OUTPATIENT
Start: 2020-06-17 | End: 2022-01-01 | Stop reason: ALTCHOICE

## 2020-06-17 RX ORDER — NITROGLYCERIN 0.4 MG/1
0.4 TABLET SUBLINGUAL
Qty: 25 TABLET | Refills: 11 | Status: SHIPPED | OUTPATIENT
Start: 2020-06-17 | End: 2022-01-01 | Stop reason: SDUPTHER

## 2020-06-17 NOTE — PROGRESS NOTES
Problem list     Subjective   Aly Gabriel III is a 70 y.o. male     Chief Complaint   Patient presents with   • Palpitations     presents for 3-4 wk stress   • Hypertension   • Shortness of Breath   Problem list     1. Three-vessel coronary artery disease  1.1 cardiac catheterization May 2017 because of inferobasal, diaphragmatic, and distal lateral ischemia demonstrating severe three-vessel disease with recommendations for mechanical revascularization.   1.2 Coronary artery bypass grafting with LIMA to LAD, vein graft to OM, vein graft to PDA and posterolateral branch by Dr. Ronal Anthony June 2017   1.3 stress test April 2018 demonstrates no evidence of ischemia and preserved LV function  1.4 cardiac catheterization May 2018 with patent LIMA and vein graft to circumflex with 90% disease in the vein graft to the RCA.  Stenting performed and nonobstructive disease otherwise normal  1.5 stress test May 2020 suggest no evidence of ischemia with mild apical septal hypokinesis with preserved LV function  2. Preserved systolic function  2.1 Echo 8/7/17-mild LVH, EF 50-55%, diastolic dysfunction 2, basal inferoseptal, and apical septal wall segments are hypokinetic, mid inferoseptal wall segment is akinetic, mild MR, trace TR and AR, PA 30-35  3. 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 Hypertension  5. Dyslipidemia history.   6.  Mild mitral and tricuspid regurgitation    HPI    Patient is a 70-year-old male that presents to the office for evaluation and follow-up.  He has done well.  No chest pain or pressure.  He appears to have mild stable levels of dyspnea.  This can experience if he tries to exert for extended levels.  No PND orthopnea.    He  does not describe palpitations or dysrhythmic symptoms.  He has had difficulty with blood pressure control.  He continues to be elevated according to the wife.  Patient also describes significant cramping and myalgias.  Especially at nighttime, they have noticed the symptoms.  Otherwise patient is doing well      Current Outpatient Medications on File Prior to Visit   Medication Sig Dispense Refill   • atorvastatin (LIPITOR) 40 MG tablet TAKE ONE TABLET BY MOUTH EVERY DAY FOR CHOLESTEROL 90 tablet 4   • clopidogrel (PLAVIX) 75 MG tablet TAKE ONE TABLET BY MOUTH EVERY DAY FOR BLOOD CIRCULATION 90 tablet 1   • furosemide (LASIX) 40 MG tablet TAKE ONE TABLET BY MOUTH DAILY 30 tablet 10   • HYDROcodone-acetaminophen (NORCO) 5-325 MG per tablet Take 1 tablet by mouth Every 6 (Six) Hours As Needed for Moderate Pain (4-6).     • lisinopril (PRINIVIL,ZESTRIL) 20 MG tablet Take 1 tablet by mouth 2 (Two) Times a Day. 60 tablet 11   • metoclopramide (REGLAN) 10 MG tablet Daily.     • metoprolol tartrate (LOPRESSOR) 25 MG tablet Daily.     • NYSTATIN PO Take  by mouth.     • omeprazole (priLOSEC) 20 MG capsule Daily.     • pantoprazole (PROTONIX) 40 MG EC tablet Take 1 tablet by mouth Daily. 30 tablet 6   • potassium chloride (K-DUR) 10 MEQ CR tablet TAKE ONE TABLET BY MOUTH DAILY 30 tablet 5   • potassium chloride (K-DUR,KLOR-CON) 10 MEQ CR tablet TAKE ONE TABLET BY MOUTH EVERY DAY FOR POTASSIUM REPLACEMENT 90 tablet 4   • ranolazine (RANEXA) 1000 MG 12 hr tablet Take 1 tablet by mouth Every 12 (Twelve) Hours. 60 tablet 6   • VENTOLIN  (90 Base) MCG/ACT inhaler As Needed.     • [DISCONTINUED] amLODIPine (NORVASC) 5 MG tablet Take 1 tablet by mouth Daily. 30 tablet 11   • [DISCONTINUED] nitroglycerin (NITROSTAT) 0.4 MG SL tablet Place 1 tablet under the tongue Every 5 (Five) Minutes As Needed for Chest Pain (chest pain). 25 tablet 11     Current Facility-Administered Medications on File Prior to Visit   Medication Dose  Route Frequency Provider Last Rate Last Dose   • Chlorhexidine Gluconate Cloth 2 % pads 1 application  1 application Topical Q12H PRN Aime Lancaster PA           Patient has no known allergies.    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   • Wears glasses    • Wears partial dentures        Social History     Socioeconomic History   • Marital status:      Spouse name: Not on file   • Number of children: 0   • Years of education: Not on file   • Highest education level: Not on file   Occupational History   • Occupation:      Employer: RETIRED   Tobacco Use   • Smoking status: Former Smoker     Packs/day: 2.00     Years: 30.00     Pack years: 60.00     Types: Cigarettes     Last attempt to quit: 2014     Years since quittin.0   • Smokeless tobacco: Never Used   Substance and Sexual Activity   • Alcohol use: No   • Drug use: No   • Sexual activity: Yes     Partners: Female     Comment: spouse       Family History   Problem Relation Age of Onset   • Heart disease Mother    • Heart disease Father        Review of Systems   Constitutional: Positive for diaphoresis and fatigue.   HENT: Positive for hearing loss.    Eyes: Positive for visual disturbance.   Respiratory: Positive for shortness of breath ( with daily activity ).    Cardiovascular: Positive for palpitations and leg swelling. Negative for chest pain.   Gastrointestinal: Negative.    Endocrine: Negative.    Genitourinary: Positive for difficulty urinating.   Musculoskeletal: Positive for arthralgias, myalgias and neck pain.   Skin: Negative.   "  Allergic/Immunologic: Positive for environmental allergies.   Neurological: Positive for weakness.   Hematological: Bruises/bleeds easily.   Psychiatric/Behavioral: Positive for sleep disturbance (off and on). The patient is nervous/anxious.    All other systems reviewed and are negative.      Objective   Vitals:    06/17/20 0954   BP: 179/100   BP Location: Right arm   Patient Position: Sitting   Pulse: 80   Temp: 96.9 °F (36.1 °C)   SpO2: 96%   Weight: 76.7 kg (169 lb 3.2 oz)   Height: 170.2 cm (67.01\")      /100 (BP Location: Right arm, Patient Position: Sitting)   Pulse 80   Temp 96.9 °F (36.1 °C)   Ht 170.2 cm (67.01\")   Wt 76.7 kg (169 lb 3.2 oz)   SpO2 96%   BMI 26.49 kg/m²     Lab Results (most recent)     None          Physical Exam   Constitutional: He is oriented to person, place, and time. He appears well-developed and well-nourished. No distress.   HENT:   Head: Normocephalic and atraumatic.   Eyes: Conjunctivae are normal. Right eye exhibits no discharge. Left eye exhibits no discharge. No scleral icterus.   Neck: No JVD present.   Cardiovascular: Normal rate, regular rhythm and normal heart sounds. Exam reveals no gallop and no friction rub.   No murmur heard.  Pulmonary/Chest: Effort normal and breath sounds normal. No respiratory distress. He has no wheezes. He has no rales. He exhibits no tenderness.   Abdominal: He exhibits no mass.   Musculoskeletal: Normal range of motion. He exhibits no edema.   Neurological: He is alert and oriented to person, place, and time. No cranial nerve deficit.   Skin: Skin is warm and dry. No rash noted. No erythema. No pallor.   Psychiatric: He has a normal mood and affect. His behavior is normal.   Nursing note and vitals reviewed.      Procedure   Procedures       Assessment/Plan     Problems Addressed this Visit        Cardiovascular and Mediastinum    Coronary artery disease involving native coronary artery of native heart without angina pectoris    " Relevant Medications    nitroglycerin (NITROSTAT) 0.4 MG SL tablet    amLODIPine (NORVASC) 5 MG tablet    Essential hypertension    Relevant Medications    amLODIPine (NORVASC) 5 MG tablet    cloNIDine (Catapres) 0.1 MG tablet       Nervous and Auditory    Myalgia - Primary       Other    Dyslipidemia          Recommendation  1.  Patient with coronary artery disease.  He has no ischemic symptoms and stress test excludes ischemia.  For now, we will continue to monitor.  2.  Patient will continue antiplatelet and statin therapy.  I am holding atorvastatin for 1 week to see if patient's muscle cramps resolved.  Patient is having issues and we will hold this for 1 week.  We will consider changing medication if patient has significant myalgias from statin use  3.  Patient with poorly controlled hypertension.  I am increasing amlodipine to 10 mg daily.  I have also prescribed clonidine as needed for hypertensive excursions.  They are to monitor blood pressure closely  4.  We will also be monitoring lipid status closely.  We will await patient's response to holding therapy and consider immunotherapy pending patient's response.  We will see patient back for follow-up in 6 months.  Will follow with primary as scheduled           Aly Gabriel III  reports that he quit smoking about 6 years ago. His smoking use included cigarettes. He has a 60.00 pack-year smoking history. He has never used smokeless tobacco.        Patient's Body mass index is 26.49 kg/m². BMI is within normal parameters. No follow-up required..       Electronically signed by:

## 2020-07-30 RX ORDER — FUROSEMIDE 40 MG/1
TABLET ORAL
Qty: 30 TABLET | Refills: 8 | Status: SHIPPED | OUTPATIENT
Start: 2020-07-30 | End: 2021-04-01

## 2020-10-23 RX ORDER — CLOPIDOGREL BISULFATE 75 MG/1
75 TABLET ORAL DAILY
Qty: 90 TABLET | Refills: 1 | Status: SHIPPED | OUTPATIENT
Start: 2020-10-23 | End: 2021-06-01

## 2020-10-23 RX ORDER — LISINOPRIL 20 MG/1
20 TABLET ORAL 2 TIMES DAILY
Qty: 180 TABLET | Refills: 1 | Status: SHIPPED | OUTPATIENT
Start: 2020-10-23

## 2020-10-23 NOTE — TELEPHONE ENCOUNTER
Rec'd a faxed request from [facility] for a refill on Lisinopril. Rx set up to be filled and sent to [facility]  for final review since Delta Shah is out of the office today. YOVANY BECK

## 2021-02-23 ENCOUNTER — OFFICE VISIT (OUTPATIENT)
Dept: CARDIOLOGY | Facility: CLINIC | Age: 71
End: 2021-02-23

## 2021-02-23 VITALS
SYSTOLIC BLOOD PRESSURE: 139 MMHG | WEIGHT: 172.2 LBS | HEIGHT: 67 IN | DIASTOLIC BLOOD PRESSURE: 79 MMHG | HEART RATE: 66 BPM | BODY MASS INDEX: 27.03 KG/M2 | OXYGEN SATURATION: 97 %

## 2021-02-23 DIAGNOSIS — R09.89 CAROTID BRUIT, UNSPECIFIED LATERALITY: ICD-10-CM

## 2021-02-23 DIAGNOSIS — I65.29 STENOSIS OF CAROTID ARTERY, UNSPECIFIED LATERALITY: ICD-10-CM

## 2021-02-23 DIAGNOSIS — I25.10 CORONARY ARTERY DISEASE INVOLVING NATIVE CORONARY ARTERY OF NATIVE HEART WITHOUT ANGINA PECTORIS: Primary | ICD-10-CM

## 2021-02-23 DIAGNOSIS — I10 ESSENTIAL HYPERTENSION: ICD-10-CM

## 2021-02-23 PROCEDURE — 99214 OFFICE O/P EST MOD 30 MIN: CPT | Performed by: PHYSICIAN ASSISTANT

## 2021-02-23 RX ORDER — CLONIDINE HYDROCHLORIDE 0.2 MG/1
0.2 TABLET ORAL 2 TIMES DAILY
Qty: 60 TABLET | Refills: 5 | Status: SHIPPED | OUTPATIENT
Start: 2021-02-23 | End: 2021-07-13

## 2021-02-23 NOTE — PROGRESS NOTES
Problem list     Subjective   Aly Gabriel III is a 70 y.o. male     Chief Complaint   Patient presents with   • Follow-up   Problem list     1. Three-vessel coronary artery disease  1.1 cardiac catheterization May 2017 because of inferobasal, diaphragmatic, and distal lateral ischemia demonstrating severe three-vessel disease with recommendations for mechanical revascularization.   1.2 Coronary artery bypass grafting with LIMA to LAD, vein graft to OM, vein graft to PDA and posterolateral branch by Dr. Ronal Anthony June 2017   1.3 stress test April 2018 demonstrates no evidence of ischemia and preserved LV function  1.4 cardiac catheterization May 2018 with patent LIMA and vein graft to circumflex with 90% disease in the vein graft to the RCA.  Stenting performed and nonobstructive disease otherwise normal  1.5 stress test May 2020 suggest no evidence of ischemia with mild apical septal hypokinesis with preserved LV function  2. Preserved systolic function  2.1 Echo 8/7/17-mild LVH, EF 50-55%, diastolic dysfunction 2, basal inferoseptal, and apical septal wall segments are hypokinetic, mid inferoseptal wall segment is akinetic, mild MR, trace TR and AR, PA 30-35  3. 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 Hypertension  5. Dyslipidemia history.   6.  Mild mitral and tricuspid regurgitation    HPI    Patient is a 70-year-old male that presents to the office for evaluation.  Patient has done remarkably well since his last visit here.  No chest pain or pressure.  No complaints of dyspnea PND orthopnea.    He does not complain of palpitations.  Blood pressure has been slightly elevated and patient's wife request higher dose of clonidine.  Otherwise  patient is doing well      Current Outpatient Medications on File Prior to Visit   Medication Sig Dispense Refill   • amLODIPine (NORVASC) 5 MG tablet Take 1 tablet by mouth 2 (Two) Times a Day. 60 tablet 5   • atorvastatin (LIPITOR) 40 MG tablet TAKE ONE TABLET BY MOUTH EVERY DAY FOR CHOLESTEROL 90 tablet 4   • clopidogrel (PLAVIX) 75 MG tablet Take 1 tablet by mouth Daily. 90 tablet 1   • furosemide (LASIX) 40 MG tablet TAKE ONE TABLET BY MOUTH EVERY DAY FOR FLUID RETENTION 30 tablet 8   • HYDROcodone-acetaminophen (NORCO) 5-325 MG per tablet Take 1 tablet by mouth Every 6 (Six) Hours As Needed for Moderate Pain (4-6).     • lisinopril (PRINIVIL,ZESTRIL) 20 MG tablet Take 1 tablet by mouth 2 (Two) Times a Day. 180 tablet 1   • metaxalone (SKELAXIN) 800 MG tablet Take 1 tablet by mouth At Night As Needed for Muscle Spasms (or cramps). 30 tablet 5   • metoclopramide (REGLAN) 10 MG tablet Daily.     • metoprolol tartrate (LOPRESSOR) 25 MG tablet Daily.     • nitroglycerin (NITROSTAT) 0.4 MG SL tablet Place 1 tablet under the tongue Every 5 (Five) Minutes As Needed for Chest Pain (chest pain). 25 tablet 11   • NYSTATIN PO Take  by mouth.     • omeprazole (priLOSEC) 20 MG capsule Daily.     • pantoprazole (PROTONIX) 40 MG EC tablet Take 1 tablet by mouth Daily. 30 tablet 6   • potassium chloride (K-DUR) 10 MEQ CR tablet TAKE ONE TABLET BY MOUTH DAILY 30 tablet 5   • potassium chloride (K-DUR,KLOR-CON) 10 MEQ CR tablet TAKE ONE TABLET BY MOUTH EVERY DAY FOR POTASSIUM REPLACEMENT 90 tablet 4   • ranolazine (RANEXA) 1000 MG 12 hr tablet Take 1 tablet by mouth Every 12 (Twelve) Hours. 60 tablet 6   • VENTOLIN  (90 Base) MCG/ACT inhaler As Needed.     • [DISCONTINUED] cloNIDine (Catapres) 0.1 MG tablet Take 1 tablet by mouth 3 (Three) Times a Day As Needed for High Blood Pressure (sbp >160mmHg). 30 tablet 11     Current Facility-Administered Medications on File Prior to Visit   Medication Dose Route Frequency  Provider Last Rate Last Admin   • Chlorhexidine Gluconate Cloth 2 % pads 1 application  1 application Topical Q12H PRN Aime Lancaster PA           Patient has no known allergies.    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   • Wears glasses    • Wears partial dentures        Social History     Socioeconomic History   • Marital status:      Spouse name: Not on file   • Number of children: 0   • Years of education: Not on file   • Highest education level: Not on file   Occupational History   • Occupation: GM     Employer: RETIRED   Tobacco Use   • Smoking status: Former Smoker     Packs/day: 2.00     Years: 30.00     Pack years: 60.00     Types: Cigarettes     Quit date: 2014     Years since quittin.6   • Smokeless tobacco: Never Used   Substance and Sexual Activity   • Alcohol use: No   • Drug use: No   • Sexual activity: Yes     Partners: Female     Comment: spouse       Family History   Problem Relation Age of Onset   • Heart disease Mother    • Heart disease Father        Review of Systems   Constitutional: Negative for chills, fatigue and fever.        Did not bring meds/list. Stated no changes.    HENT: Negative for congestion, rhinorrhea and sore throat.    Eyes: Positive for visual disturbance (glasses).   Respiratory: Negative for apnea, chest tightness and shortness of breath.    Cardiovascular: Positive for leg swelling. Negative for chest pain and palpitations.   Gastrointestinal: Negative.    Endocrine: Negative.    Genitourinary: Negative.    Musculoskeletal: Positive for arthralgias, back  "pain, gait problem (unsteady), neck pain and neck stiffness.   Skin: Negative.  Negative for rash and wound.   Allergic/Immunologic: Negative.  Negative for environmental allergies and food allergies.   Neurological: Positive for dizziness, weakness (in legs & R arm) and headaches. Negative for light-headedness and numbness.   Hematological: Bruises/bleeds easily.   Psychiatric/Behavioral: Positive for sleep disturbance.       Objective   Vitals:    02/23/21 1107   BP: 139/79   BP Location: Right arm   Patient Position: Sitting   Pulse: 66   SpO2: 97%   Weight: 78.1 kg (172 lb 3.2 oz)   Height: 170.2 cm (67.01\")      /79 (BP Location: Right arm, Patient Position: Sitting)   Pulse 66   Ht 170.2 cm (67.01\")   Wt 78.1 kg (172 lb 3.2 oz)   SpO2 97%   BMI 26.96 kg/m²     Lab Results (most recent)     None          Physical Exam  Vitals signs and nursing note reviewed.   Constitutional:       General: He is not in acute distress.     Appearance: Normal appearance. He is well-developed.   HENT:      Head: Normocephalic and atraumatic.   Eyes:      General: No scleral icterus.        Right eye: No discharge.         Left eye: No discharge.      Conjunctiva/sclera: Conjunctivae normal.   Neck:      Vascular: Carotid bruit present.   Cardiovascular:      Rate and Rhythm: Normal rate and regular rhythm.      Heart sounds: Normal heart sounds. No murmur. No friction rub. No gallop.    Pulmonary:      Effort: Pulmonary effort is normal. No respiratory distress.      Breath sounds: Normal breath sounds. No wheezing or rales.   Chest:      Chest wall: No tenderness.   Musculoskeletal:      Right lower leg: No edema.      Left lower leg: No edema.   Skin:     General: Skin is warm and dry.      Coloration: Skin is not pale.      Findings: No erythema or rash.   Neurological:      Mental Status: He is alert and oriented to person, place, and time.      Cranial Nerves: No cranial nerve deficit.   Psychiatric:         " Behavior: Behavior normal.         Procedure   Procedures       Assessment/Plan     Problems Addressed this Visit        Cardiac and Vasculature    Coronary artery disease involving native coronary artery of native heart without angina pectoris - Primary    Essential hypertension    Relevant Medications    cloNIDine (CATAPRES) 0.2 MG tablet    Stenosis of carotid artery    Relevant Orders    Duplex Carotid Ultrasound CAR    Carotid bruit    Relevant Orders    Duplex Carotid Ultrasound CAR      Diagnoses       Codes Comments    Coronary artery disease involving native coronary artery of native heart without angina pectoris    -  Primary ICD-10-CM: I25.10  ICD-9-CM: 414.01     Stenosis of carotid artery, unspecified laterality     ICD-10-CM: I65.29  ICD-9-CM: 433.10     Carotid bruit, unspecified laterality     ICD-10-CM: R09.89  ICD-9-CM: 785.9     Essential hypertension     ICD-10-CM: I10  ICD-9-CM: 401.9           Recommendation  1.  Patient with coronary artery disease with no ischemic symptoms at this point.  For now we will continue to monitor and continue antiplatelet and statin therapy.  Labs are being monitored by primary    2.  Patient with history of carotid stenosis with endarterectomies in the past.  I would like to repeat carotid scan to evaluate    3.  Baseline hypertension I will increase clonidine to 0.2 mg.  Patient is to monitor blood pressure closely.  Otherwise he is on appropriate medical therapy we will see him back for follow-up after testing.  Follow-up with primary as scheduled         Aly Gabriel III  reports that he quit smoking about 6 years ago. His smoking use included cigarettes. He has a 60.00 pack-year smoking history. He has never used smokeless tobacco.        Patient's Body mass index is 26.96 kg/m². BMI is above normal parameters. Recommendations include: educational material.       Advance Care Planning   ACP discussion was held with the patient during this visit. Patient has an  advance directive (not in EMR), copy requested.    Electronically signed by:

## 2021-02-23 NOTE — PATIENT INSTRUCTIONS
How to Quarantine at Home  Information for Patients and Families    These instructions are for people with confirmed or suspected COVID-19 who do not need to be hospitalized and those with confirmed COVID-19 who were hospitalized and discharged to care for themselves at home.    If you were tested through the Health Department  The Health Department will monitor your wellbeing.  If it is determined that you do not need to be hospitalized and can be isolated at home, you will be monitored by staff from your local or state health department.     If you were tested through a Commercial Lab  You will need to monitor yourself and report changes in your symptoms to your doctor.  See the section below called Monitor Your Symptoms.    Follow these steps until a healthcare provider or local or state health department says you can return to your normal activities.    Stay home except to get medical care  • Restrict activities outside your home, except for getting medical care.   • Do not go to work, school, or public areas.   • Avoid using public transportation, ride-sharing, or taxis.    Separate yourself from other people and animals in your home  People  As much as possible, you should stay in a specific room and away from other people in your home. Also, you should use a separate bathroom, if available.    Animals  You should restrict contact with pets and other animals while you are sick with COVID-19, just like you would around other people. When possible, have another member of your household care for your animals while you are sick. If you are sick with COVID-19, avoid contact with your pet, including petting, snuggling, being kissed or licked, and sharing food. If you must care for your pet or be around animals while you are sick, wash your hands before and after you interact with pets and wear a facemask. See COVID-19 and Animals for more information.    Call ahead before visiting your doctor  If you have a medical  appointment, call the healthcare provider and tell them that you have or may have COVID-19. This information will help the healthcare provider’s office take steps to keep other people from getting infected or exposed.    Wear a facemask  You should wear a facemask when you are around other people (e.g., sharing a room or vehicle) or pets and before you enter a healthcare provider’s office.     If you are not able to wear a facemask (for example, because it causes trouble breathing), then people who live with you should not stay in the same room with you, or they should wear a facemask if they enter your room.    Cover your coughs and sneezes  • Cover your mouth and nose with a tissue when you cough or sneeze.   • Throw used tissues in a lined trash can.   • Immediately wash your hands with soap and water for at least 20 seconds or, if soap and water are not available, clean your hands with an alcohol-based hand  that contains at least 60% alcohol.    Clean your hands often  • Wash your hands often with soap and water for at least 20 seconds, especially after blowing your nose, coughing, or sneezing; going to the bathroom; and before eating or preparing food.     • If soap and water are not readily available, use an alcohol-based hand  with at least 60% alcohol, covering all surfaces of your hands and rubbing them together until they feel dry.    • Soap and water are the best option if hands are visibly dirty. Avoid touching your eyes, nose, and mouth with unwashed hands.    Avoid sharing personal household items  • You should not share dishes, drinking glasses, cups, eating utensils, towels, or bedding with other people or pets in your home.   • After using these items, they should be washed thoroughly with soap and water.    Clean all “high-touch” surfaces everyday  • High touch surfaces include counters, tabletops, doorknobs, bathroom fixtures, toilets, phones, keyboards, tablets, and bedside  tables.   • Also, clean any surfaces that may have blood, stool, or body fluids on them.   • Use a household cleaning spray or wipe, according to the label instructions. Labels contain instructions for safe and effective use of the cleaning product, including precautions you should take when applying the product, such as wearing gloves and making sure you have good ventilation during use of the product.    Monitor your symptoms  • Seek prompt medical attention if your illness is worsening (e.g., difficulty breathing).   • Before seeking care, call your healthcare provider and tell them that you have, or are being evaluated for, COVID-19.   • Put on a facemask before you enter the facility.     • These steps will help the healthcare provider’s office to keep other people in the office or waiting room from getting infected or exposed.   • Persons who are placed under active monitoring or facilitated self-monitoring should follow instructions provided by their local health department or occupational health professionals, as appropriate.  • If you have a medical emergency and need to call 911, notify the dispatch personnel that you have, or are being evaluated for COVID-19. If possible, put on a facemask before emergency medical services arrive.    Discontinuing home isolation  Patients with confirmed COVID-19 should remain under home isolation precautions until the risk of secondary transmission to others is thought to be low. The decision to discontinue home isolation precautions should be made on a case-by-case basis, in consultation with healthcare providers and state and local health departments.    The below content are for household members, intimate partners, and caregivers of a patient with symptomatic laboratory-confirmed COVID-19 or a patient under investigation:    Household members, intimate partners, and caregivers may have close contact with a person with symptomatic, laboratory-confirmed COVID-19 or a  person under investigation.     Close contacts should monitor their health; they should call their healthcare provider right away if they develop symptoms suggestive of COVID-19 (e.g., fever, cough, shortness of breath)     Close contacts should also follow these recommendations:  • Make sure that you understand and can help the patient follow their healthcare provider’s instructions for medication(s) and care. You should help the patient with basic needs in the home and provide support for getting groceries, prescriptions, and other personal needs.  • Monitor the patient’s symptoms. If the patient is getting sicker, call his or her healthcare provider and tell them that the patient has laboratory-confirmed COVID-19. This will help the healthcare provider’s office take steps to keep other people in the office or waiting room from getting infected. Ask the healthcare provider to call the local or [facility] health department for additional guidance. If the patient has a medical emergency and you need to call 911, notify the dispatch personnel that the patient has, or is being evaluated for COVID-19.  • Household members should stay in another room or be  from the patient as much as possible. Household members should use a separate bedroom and bathroom, if available.  • Prohibit visitors who do not have an essential need to be in the home.  • Household members should care for any pets in the home. Do not handle pets or other animals while sick.  For more information, see COVID-19 and Animals.  • Make sure that shared spaces in the home have good air flow, such as by an air conditioner or an opened window, weather permitting.  • Perform hand hygiene frequently. Wash your hands often with soap and water for at least 20 seconds or use an alcohol-based hand  that contains 60 to 95% alcohol, covering all surfaces of your hands and rubbing them together until they feel dry. Soap and water should be used  preferentially if hands are visibly dirty.  • Avoid touching your eyes, nose, and mouth with unwashed hands.  • The patient should wear a facemask when you are around other people. If the patient is not able to wear a facemask (for example, because it causes trouble breathing), you, as the caregiver, should wear a mask when you are in the same room as the patient.  • Wear a disposable facemask and gloves when you touch or have contact with the patient’s blood, stool, or body fluids, such as saliva, sputum, nasal mucus, vomit, or urine.   o Throw out disposable facemasks and gloves after using them. Do not reuse.  o When removing personal protective equipment, first remove and dispose of gloves. Then, immediately clean your hands with soap and water or alcohol-based hand . Next, remove and dispose of facemask, and immediately clean your hands again with soap and water or alcohol-based hand .  • Avoid sharing household items with the patient. You should not share dishes, drinking glasses, cups, eating utensils, towels, bedding, or other items. After the patient uses these items, you should wash them thoroughly (see below “Wash laundry thoroughly”).  • Clean all “high-touch” surfaces, such as counters, tabletops, doorknobs, bathroom fixtures, toilets, phones, keyboards, tablets, and bedside tables, every day. Also, clean any surfaces that may have blood, stool, or body fluids on them.   o Use a household cleaning spray or wipe, according to the label instructions. Labels contain instructions for safe and effective use of the cleaning product including precautions you should take when applying the product, such as wearing gloves and making sure you have good ventilation during use of the product.  • Wash laundry thoroughly.   o Immediately remove and wash clothes or bedding that have blood, stool, or body fluids on them.  o Wear disposable gloves while handling soiled items and keep soiled items away  from your body. Clean your hands (with soap and water or an alcohol-based hand ) immediately after removing your gloves.  o Read and follow directions on labels of laundry or clothing items and detergent. In general, using a normal laundry detergent according to washing machine instructions and dry thoroughly using the warmest temperatures recommended on the clothing label.  • Place all used disposable gloves, facemasks, and other contaminated items in a lined container before disposing of them with other household waste. Clean your hands (with soap and water or an alcohol-based hand ) immediately after handling these items. Soap and water should be used preferentially if hands are visibly dirty.  • Discuss any additional questions with your state or local health department or healthcare provider.    Adapted from information provided by the Centers for Disease Control and Prevention.  For more information, visit https://www.cdc.gov/coronavirus/2019-ncov/hcp/guidance-prevent-spread.htmlHeart-Healthy Eating Plan  Heart-healthy meal planning includes:  · Eating less unhealthy fats.  · Eating more healthy fats.  · Making other changes in your diet.  Talk with your doctor or a diet specialist (dietitian) to create an eating plan that is right for you.  What is my plan?  Your doctor may recommend an eating plan that includes:  · Total fat: ______% or less of total calories a day.  · Saturated fat: ______% or less of total calories a day.  · Cholesterol: less than _________mg a day.  What are tips for following this plan?  Cooking  Avoid frying your food. Try to bake, boil, grill, or broil it instead. You can also reduce fat by:  · Removing the skin from poultry.  · Removing all visible fats from meats.  · Steaming vegetables in water or broth.  Meal planning    · At meals, divide your plate into four equal parts:  ? Fill one-half of your plate with vegetables and green salads.  ? Fill one-fourth of your  plate with whole grains.  ? Fill one-fourth of your plate with lean protein foods.  · Eat 4-5 servings of vegetables per day. A serving of vegetables is:  ? 1 cup of raw or cooked vegetables.  ? 2 cups of raw leafy greens.  · Eat 4-5 servings of fruit per day. A serving of fruit is:  ? 1 medium whole fruit.  ? ¼ cup of dried fruit.  ? ½ cup of fresh, frozen, or canned fruit.  ? ½ cup of 100% fruit juice.  · Eat more foods that have soluble fiber. These are apples, broccoli, carrots, beans, peas, and barley. Try to get 20-30 g of fiber per day.  · Eat 4-5 servings of nuts, legumes, and seeds per week:  ? 1 serving of dried beans or legumes equals ½ cup after being cooked.  ? 1 serving of nuts is ¼ cup.  ? 1 serving of seeds equals 1 tablespoon.  General information  · Eat more home-cooked food. Eat less restaurant, buffet, and fast food.  · Limit or avoid alcohol.  · Limit foods that are high in starch and sugar.  · Avoid fried foods.  · Lose weight if you are overweight.  · Keep track of how much salt (sodium) you eat. This is important if you have high blood pressure. Ask your doctor to tell you more about this.  · Try to add vegetarian meals each week.  Fats  · Choose healthy fats. These include olive oil and canola oil, flaxseeds, walnuts, almonds, and seeds.  · Eat more omega-3 fats. These include salmon, mackerel, sardines, tuna, flaxseed oil, and ground flaxseeds. Try to eat fish at least 2 times each week.  · Check food labels. Avoid foods with trans fats or high amounts of saturated fat.  · Limit saturated fats.  ? These are often found in animal products, such as meats, butter, and cream.  ? These are also found in plant foods, such as palm oil, palm kernel oil, and coconut oil.  · Avoid foods with partially hydrogenated oils in them. These have trans fats. Examples are stick margarine, some tub margarines, cookies, crackers, and other baked goods.  What foods can I eat?  Fruits  All fresh, canned (in  natural juice), or frozen fruits.  Vegetables  Fresh or frozen vegetables (raw, steamed, roasted, or grilled). Green salads.  Grains  Most grains. Choose whole wheat and whole grains most of the time. Rice and pasta, including brown rice and pastas made with whole wheat.  Meats and other proteins  Lean, well-trimmed beef, veal, pork, and lamb. Chicken and turkey without skin. All fish and shellfish. Wild duck, rabbit, pheasant, and venison. Egg whites or low-cholesterol egg substitutes. Dried beans, peas, lentils, and tofu. Seeds and most nuts.  Dairy  Low-fat or nonfat cheeses, including ricotta and mozzarella. Skim or 1% milk that is liquid, powdered, or evaporated. Buttermilk that is made with low-fat milk. Nonfat or low-fat yogurt.  Fats and oils  Non-hydrogenated (trans-free) margarines. Vegetable oils, including soybean, sesame, sunflower, olive, peanut, safflower, corn, canola, and cottonseed. Salad dressings or mayonnaise made with a vegetable oil.  Beverages  Mineral water. Coffee and tea. Diet carbonated beverages.  Sweets and desserts  Sherbet, gelatin, and fruit ice. Small amounts of dark chocolate.  Limit all sweets and desserts.  Seasonings and condiments  All seasonings and condiments.  The items listed above may not be a complete list of foods and drinks you can eat. Contact a dietitian for more options.  What foods should I avoid?  Fruits  Canned fruit in heavy syrup. Fruit in cream or butter sauce. Fried fruit. Limit coconut.  Vegetables  Vegetables cooked in cheese, cream, or butter sauce. Fried vegetables.  Grains  Breads that are made with saturated or trans fats, oils, or whole milk. Croissants. Sweet rolls. Donuts. High-fat crackers, such as cheese crackers.  Meats and other proteins  Fatty meats, such as hot dogs, ribs, sausage, edmond, rib-eye roast or steak. High-fat deli meats, such as salami and bologna. Caviar. Domestic duck and goose. Organ meats, such as liver.  Dairy  Cream, sour  cream, cream cheese, and creamed cottage cheese. Whole-milk cheeses. Whole or 2% milk that is liquid, evaporated, or condensed. Whole buttermilk. Cream sauce or high-fat cheese sauce. Yogurt that is made from whole milk.  Fats and oils  Meat fat, or shortening. Cocoa butter, hydrogenated oils, palm oil, coconut oil, palm kernel oil. Solid fats and shortenings, including edmond fat, salt pork, lard, and butter. Nondairy cream substitutes. Salad dressings with cheese or sour cream.  Beverages  Regular sodas and juice drinks with added sugar.  Sweets and desserts  Frosting. Pudding. Cookies. Cakes. Pies. Milk chocolate or white chocolate. Buttered syrups. Full-fat ice cream or ice cream drinks.  The items listed above may not be a complete list of foods and drinks to avoid. Contact a dietitian for more information.  Summary  · Heart-healthy meal planning includes eating less unhealthy fats, eating more healthy fats, and making other changes in your diet.  · Eat a balanced diet. This includes fruits and vegetables, low-fat or nonfat dairy, lean protein, nuts and legumes, whole grains, and heart-healthy oils and fats.  This information is not intended to replace advice given to you by your health care provider. Make sure you discuss any questions you have with your health care provider.  Document Revised: 02/21/2019 Document Reviewed: 01/25/2019  [employer] Patient Education © 2020 [employer]

## 2021-03-08 ENCOUNTER — HOSPITAL ENCOUNTER (OUTPATIENT)
Dept: CARDIOLOGY | Facility: HOSPITAL | Age: 71
Discharge: HOME | End: 2021-03-08
Admitting: PHYSICIAN ASSISTANT

## 2021-03-08 DIAGNOSIS — I65.29 STENOSIS OF CAROTID ARTERY, UNSPECIFIED LATERALITY: ICD-10-CM

## 2021-03-08 DIAGNOSIS — R09.89 CAROTID BRUIT, UNSPECIFIED LATERALITY: ICD-10-CM

## 2021-03-08 PROCEDURE — 93880 EXTRACRANIAL BILAT STUDY: CPT | Performed by: INTERNAL MEDICINE

## 2021-03-08 PROCEDURE — 93880 EXTRACRANIAL BILAT STUDY: CPT

## 2021-03-10 ENCOUNTER — TELEPHONE (OUTPATIENT)
Dept: CARDIOLOGY | Facility: CLINIC | Age: 71
End: 2021-03-10

## 2021-03-10 NOTE — TELEPHONE ENCOUNTER
Patients wife called - do confirm that  is suppose to be on Clonidine 0.2 mg 1 twice daily.     She wants to make sure its not suppose to be 3 times a day.       AT [facility]       Cris spoke to Delta WOOTEN- he wants patient to stay on twice a day IF absolutely necessary he can take a 3rd pill to keep get BP down if top # over 160 or bottom # over 90 , BUT wants him only on twice daily for now.     Patients wife verbalized understanding AT [facility]

## 2021-03-21 LAB
BH CV ECHO MEAS - BSA(HAYCOCK): 1.9 M^2
BH CV ECHO MEAS - BSA: 1.9 M^2
BH CV ECHO MEAS - BZI_BMI: 26.9 KILOGRAMS/M^2
BH CV ECHO MEAS - BZI_METRIC_HEIGHT: 170.2 CM
BH CV ECHO MEAS - BZI_METRIC_WEIGHT: 78 KG
BH CV XLRA MEAS LEFT BULB EDV: -15.3 CM/SEC
BH CV XLRA MEAS LEFT BULB PSV: -51.5 CM/SEC
BH CV XLRA MEAS LEFT CCA RATIO VEL: -61.6 CM/SEC
BH CV XLRA MEAS LEFT DIST CCA EDV: -21.8 CM/SEC
BH CV XLRA MEAS LEFT DIST CCA PSV: -62 CM/SEC
BH CV XLRA MEAS LEFT DIST ICA EDV: -33.3 CM/SEC
BH CV XLRA MEAS LEFT DIST ICA PSV: -91.1 CM/SEC
BH CV XLRA MEAS LEFT ICA RATIO VEL: -120 CM/SEC
BH CV XLRA MEAS LEFT ICA/CCA RATIO: 1.9
BH CV XLRA MEAS LEFT MID ICA EDV: -43.4 CM/SEC
BH CV XLRA MEAS LEFT MID ICA PSV: -120.7 CM/SEC
BH CV XLRA MEAS LEFT PROX CCA EDV: 18.8 CM/SEC
BH CV XLRA MEAS LEFT PROX CCA PSV: 70.7 CM/SEC
BH CV XLRA MEAS LEFT PROX ECA EDV: -21.6 CM/SEC
BH CV XLRA MEAS LEFT PROX ECA PSV: -158.1 CM/SEC
BH CV XLRA MEAS LEFT PROX ICA EDV: -23.1 CM/SEC
BH CV XLRA MEAS LEFT PROX ICA PSV: -66.3 CM/SEC
BH CV XLRA MEAS LEFT VERTEBRAL A EDV: 14.9 CM/SEC
BH CV XLRA MEAS LEFT VERTEBRAL A PSV: 47.9 CM/SEC
BH CV XLRA MEAS RIGHT BULB EDV: -21.5 CM/SEC
BH CV XLRA MEAS RIGHT BULB PSV: -59.6 CM/SEC
BH CV XLRA MEAS RIGHT CCA RATIO VEL: -72.8 CM/SEC
BH CV XLRA MEAS RIGHT DIST CCA EDV: -18.2 CM/SEC
BH CV XLRA MEAS RIGHT DIST CCA PSV: -73.3 CM/SEC
BH CV XLRA MEAS RIGHT DIST ICA EDV: -46.3 CM/SEC
BH CV XLRA MEAS RIGHT DIST ICA PSV: -76.6 CM/SEC
BH CV XLRA MEAS RIGHT ICA RATIO VEL: -84.4 CM/SEC
BH CV XLRA MEAS RIGHT ICA/CCA RATIO: 1.2
BH CV XLRA MEAS RIGHT MID ICA EDV: -40.8 CM/SEC
BH CV XLRA MEAS RIGHT MID ICA PSV: -84.9 CM/SEC
BH CV XLRA MEAS RIGHT PROX CCA EDV: 10.2 CM/SEC
BH CV XLRA MEAS RIGHT PROX CCA PSV: 93.5 CM/SEC
BH CV XLRA MEAS RIGHT PROX ECA EDV: -14.3 CM/SEC
BH CV XLRA MEAS RIGHT PROX ECA PSV: -85.5 CM/SEC
BH CV XLRA MEAS RIGHT PROX ICA EDV: -25.9 CM/SEC
BH CV XLRA MEAS RIGHT PROX ICA PSV: -76.6 CM/SEC
BH CV XLRA MEAS RIGHT VERTEBRAL A EDV: -16.2 CM/SEC
BH CV XLRA MEAS RIGHT VERTEBRAL A PSV: -50.2 CM/SEC

## 2021-03-22 ENCOUNTER — TELEPHONE (OUTPATIENT)
Dept: CARDIOLOGY | Facility: CLINIC | Age: 71
End: 2021-03-22

## 2021-03-22 NOTE — TELEPHONE ENCOUNTER
----- Message from Cris Smiley sent at 3/22/2021 10:44 AM EDT -----    ----- Message -----  From: Delta Shah PA  Sent: 3/22/2021   9:06 AM EDT  To: Cris Smiley    Routine follow-up

## 2021-03-22 NOTE — TELEPHONE ENCOUNTER
Duplex Carotid:  Summary: Nonobstructive carotid disease bilaterally as above.  Antegrade flow in both vertebral arteries.      Called and informed pt's wife of results and informed her of appt, she verbalized understanding.

## 2021-04-01 RX ORDER — FUROSEMIDE 40 MG/1
TABLET ORAL
Qty: 30 TABLET | Refills: 8 | Status: SHIPPED | OUTPATIENT
Start: 2021-04-01 | End: 2021-12-30

## 2021-06-01 RX ORDER — CLOPIDOGREL BISULFATE 75 MG/1
TABLET ORAL
Qty: 30 TABLET | Refills: 2 | Status: SHIPPED | OUTPATIENT
Start: 2021-06-01 | End: 2021-08-19

## 2021-06-14 RX ORDER — POTASSIUM CHLORIDE 750 MG/1
TABLET, EXTENDED RELEASE ORAL
Qty: 30 TABLET | Refills: 4 | Status: SHIPPED | OUTPATIENT
Start: 2021-06-14 | End: 2021-11-04

## 2021-07-06 DIAGNOSIS — E78.2 MIXED HYPERLIPIDEMIA: ICD-10-CM

## 2021-07-06 RX ORDER — ATORVASTATIN CALCIUM 40 MG/1
TABLET, FILM COATED ORAL
Qty: 30 TABLET | Refills: 4 | Status: SHIPPED | OUTPATIENT
Start: 2021-07-06 | End: 2021-12-30

## 2021-07-13 RX ORDER — CLONIDINE HYDROCHLORIDE 0.2 MG/1
TABLET ORAL
Qty: 60 TABLET | Refills: 1 | Status: SHIPPED | OUTPATIENT
Start: 2021-07-13 | End: 2021-08-19

## 2021-08-19 DIAGNOSIS — I10 ESSENTIAL HYPERTENSION: Primary | ICD-10-CM

## 2021-08-19 DIAGNOSIS — I25.10 CORONARY ARTERY DISEASE INVOLVING NATIVE CORONARY ARTERY OF NATIVE HEART WITHOUT ANGINA PECTORIS: ICD-10-CM

## 2021-08-19 DIAGNOSIS — I65.29 STENOSIS OF CAROTID ARTERY, UNSPECIFIED LATERALITY: Primary | ICD-10-CM

## 2021-08-19 RX ORDER — CLOPIDOGREL BISULFATE 75 MG/1
TABLET ORAL
Qty: 30 TABLET | Refills: 5 | Status: SHIPPED | OUTPATIENT
Start: 2021-08-19 | End: 2022-01-01 | Stop reason: SDUPTHER

## 2021-08-19 RX ORDER — CLONIDINE HYDROCHLORIDE 0.2 MG/1
TABLET ORAL
Qty: 60 TABLET | Refills: 5 | Status: SHIPPED | OUTPATIENT
Start: 2021-08-19 | End: 2021-12-30

## 2021-10-13 ENCOUNTER — OFFICE VISIT (OUTPATIENT)
Dept: FAMILY MEDICINE CLINIC | Facility: CLINIC | Age: 71
End: 2021-10-13

## 2021-10-13 VITALS
BODY MASS INDEX: 27.31 KG/M2 | HEIGHT: 67 IN | SYSTOLIC BLOOD PRESSURE: 151 MMHG | TEMPERATURE: 98.7 F | OXYGEN SATURATION: 97 % | WEIGHT: 174 LBS | DIASTOLIC BLOOD PRESSURE: 82 MMHG | RESPIRATION RATE: 22 BRPM | HEART RATE: 94 BPM

## 2021-10-13 DIAGNOSIS — J30.2 SEASONAL ALLERGIC RHINITIS, UNSPECIFIED TRIGGER: ICD-10-CM

## 2021-10-13 DIAGNOSIS — J06.9 UPPER RESPIRATORY TRACT INFECTION, UNSPECIFIED TYPE: ICD-10-CM

## 2021-10-13 DIAGNOSIS — R06.02 SHORTNESS OF BREATH: ICD-10-CM

## 2021-10-13 DIAGNOSIS — R05.9 COUGH: ICD-10-CM

## 2021-10-13 DIAGNOSIS — R06.02 SHORTNESS OF BREATH: Primary | ICD-10-CM

## 2021-10-13 PROCEDURE — U0004 COV-19 TEST NON-CDC HGH THRU: HCPCS | Performed by: NURSE PRACTITIONER

## 2021-10-13 PROCEDURE — U0005 INFEC AGEN DETEC AMPLI PROBE: HCPCS | Performed by: NURSE PRACTITIONER

## 2021-10-13 PROCEDURE — 99203 OFFICE O/P NEW LOW 30 MIN: CPT | Performed by: NURSE PRACTITIONER

## 2021-10-13 PROCEDURE — 96372 THER/PROPH/DIAG INJ SC/IM: CPT | Performed by: NURSE PRACTITIONER

## 2021-10-13 RX ORDER — CEFTRIAXONE 500 MG/1
500 INJECTION, POWDER, FOR SOLUTION INTRAMUSCULAR; INTRAVENOUS ONCE
Status: COMPLETED | OUTPATIENT
Start: 2021-10-13 | End: 2021-10-13

## 2021-10-13 RX ORDER — AZITHROMYCIN 250 MG/1
TABLET, FILM COATED ORAL
Qty: 6 TABLET | Refills: 0 | Status: SHIPPED | OUTPATIENT
Start: 2021-10-13 | End: 2021-10-15

## 2021-10-13 RX ORDER — MEGESTROL ACETATE 40 MG/1
TABLET ORAL
COMMUNITY
Start: 2021-09-20 | End: 2022-01-01 | Stop reason: ALTCHOICE

## 2021-10-13 RX ORDER — LEVETIRACETAM 500 MG/1
500 TABLET ORAL 2 TIMES DAILY
COMMUNITY
Start: 2021-10-06

## 2021-10-13 RX ORDER — DEXAMETHASONE SODIUM PHOSPHATE 4 MG/ML
8 INJECTION, SOLUTION INTRA-ARTICULAR; INTRALESIONAL; INTRAMUSCULAR; INTRAVENOUS; SOFT TISSUE ONCE
Status: COMPLETED | OUTPATIENT
Start: 2021-10-13 | End: 2021-10-13

## 2021-10-13 RX ORDER — TAMSULOSIN HYDROCHLORIDE 0.4 MG/1
CAPSULE ORAL
COMMUNITY
Start: 2021-10-10 | End: 2022-01-01 | Stop reason: ALTCHOICE

## 2021-10-13 RX ORDER — CETIRIZINE HYDROCHLORIDE 10 MG/1
10 TABLET ORAL DAILY
Qty: 30 TABLET | Refills: 2 | Status: SHIPPED | OUTPATIENT
Start: 2021-10-13

## 2021-10-13 RX ORDER — PROMETHAZINE HYDROCHLORIDE 25 MG/1
TABLET ORAL
COMMUNITY
Start: 2021-10-08

## 2021-10-13 RX ORDER — FLUTICASONE PROPIONATE 50 MCG
2 SPRAY, SUSPENSION (ML) NASAL DAILY
Qty: 15.8 ML | Refills: 0 | Status: SHIPPED | OUTPATIENT
Start: 2021-10-13

## 2021-10-13 RX ADMIN — CEFTRIAXONE 500 MG: 500 INJECTION, POWDER, FOR SOLUTION INTRAMUSCULAR; INTRAVENOUS at 16:09

## 2021-10-13 RX ADMIN — DEXAMETHASONE SODIUM PHOSPHATE 8 MG: 4 INJECTION, SOLUTION INTRA-ARTICULAR; INTRALESIONAL; INTRAMUSCULAR; INTRAVENOUS; SOFT TISSUE at 16:10

## 2021-10-13 NOTE — PROGRESS NOTES
"Chief Complaint  Cough (x 3  days) and URI (x 3 days)    Subjective          Aly Gabriel III presents to [facility] for acute care (cough/possible URI).    Cough  This is a recurrent problem. The current episode started more than 1 month ago (worse since last week). The problem has been gradually worsening. The problem occurs every few minutes. The cough is productive of purulent sputum. Associated symptoms include headaches, nasal congestion, postnasal drip, rhinorrhea, shortness of breath and wheezing. Pertinent negatives include no chest pain, chills, ear congestion, ear pain, fever, heartburn, hemoptysis, myalgias, rash, sore throat, sweats or weight loss. He has tried a beta-agonist inhaler for the symptoms. The treatment provided moderate relief. His past medical history is significant for bronchitis. There is no history of asthma, COPD, environmental allergies or pneumonia.   URI   This is a recurrent problem. The current episode started more than 1 month ago. The problem has been gradually worsening. There has been no fever. Associated symptoms include congestion, coughing, headaches, rhinorrhea, sinus pain and wheezing. Pertinent negatives include no abdominal pain, chest pain, diarrhea, dysuria, ear pain, joint pain, joint swelling, nausea, neck pain, plugged ear sensation, rash, sneezing, sore throat, swollen glands or vomiting. He has tried inhaler use for the symptoms. The treatment provided moderate relief.     Objective   Vital Signs:   /82 (BP Location: Left arm, Patient Position: Sitting, Cuff Size: Adult)   Pulse 94   Temp 98.7 °F (37.1 °C) (Temporal)   Resp 22   Ht 170.2 cm (67\")   Wt 78.9 kg (174 lb)   SpO2 97%   BMI 27.25 kg/m²     Physical Exam  Vitals and nursing note reviewed.   Constitutional:       General: He is awake.      Appearance: Normal appearance.   HENT:      Head: Normocephalic.      Right Ear: Decreased hearing noted. Swelling and " tenderness present. A middle ear effusion is present.      Left Ear: Decreased hearing noted. Swelling and tenderness present. A middle ear effusion is present.      Nose: Nasal tenderness and congestion present.      Right Turbinates: Swollen and pale.      Left Turbinates: Swollen and pale.      Mouth/Throat:      Lips: Pink.      Mouth: Mucous membranes are moist.      Pharynx: Pharyngeal swelling and posterior oropharyngeal erythema present.   Eyes:      General: Lids are normal.      Conjunctiva/sclera: Conjunctivae normal.      Pupils: Pupils are equal, round, and reactive to light.   Cardiovascular:      Rate and Rhythm: Normal rate and regular rhythm.      Pulses: Normal pulses.      Heart sounds: Normal heart sounds.   Pulmonary:      Effort: Pulmonary effort is normal. No respiratory distress.      Breath sounds: Decreased air movement present. Examination of the right-upper field reveals wheezing and rhonchi. Examination of the left-upper field reveals wheezing and rhonchi. Examination of the right-middle field reveals wheezing and rhonchi. Examination of the left-middle field reveals wheezing and rhonchi. Examination of the right-lower field reveals wheezing and rhonchi. Examination of the left-lower field reveals wheezing and rhonchi. Wheezing and rhonchi present.   Abdominal:      General: Abdomen is protuberant. Bowel sounds are normal.      Palpations: Abdomen is soft.      Tenderness: There is no abdominal tenderness.   Musculoskeletal:         General: Normal range of motion.      Cervical back: Normal range of motion.   Lymphadenopathy:      Cervical: Cervical adenopathy present.   Skin:     General: Skin is warm and dry.      Capillary Refill: Capillary refill takes less than 2 seconds.   Neurological:      Mental Status: He is alert.      Motor: Weakness present.      Comments: Pt has speech and motor deficits s/p stroke   Psychiatric:         Attention and Perception: Attention normal.          Mood and Affect: Affect normal.         Speech: Speech is delayed and tangential.         Behavior: Behavior is cooperative.        Result Review :   The following data was reviewed by: DINA Alfaro on 10/13/2021:    Data reviewed: Radiologic studies 10/13/2021 - chest x-ray          Assessment and Plan    Diagnoses and all orders for this visit:    1. Shortness of breath (Primary)  -     XR Chest PA & Lateral (In Office)  -     COVID-19 PCR, [facility], NP SWAB IN [facility]/ORAL SWISH 24-30 HR TAT - Swab, Nasopharynx; Future  -     dexamethasone (DECADRON) injection 8 mg    2. Upper respiratory tract infection, unspecified type  -     azithromycin (Zithromax) 250 MG tablet; Take 2 tablets the first day, then 1 tablet daily for 4 days.  Dispense: 6 tablet; Refill: 0  -     cefTRIAXone (ROCEPHIN) injection 500 mg  -     dexamethasone (DECADRON) injection 8 mg    3. Cough  -     dexamethasone (DECADRON) injection 8 mg    4. Seasonal allergic rhinitis, unspecified trigger  -     fluticasone (Flonase) 50 MCG/ACT nasal spray; 2 sprays into the nostril(s) as directed by provider Daily.  Dispense: 15.8 mL; Refill: 0  -     cetirizine (zyrTEC) 10 MG tablet; Take 1 tablet by mouth Daily.  Dispense: 30 tablet; Refill: 2      I spent 25 minutes caring for Aly on this date of service. This time includes time spent by me in the following activities:preparing for the visit, reviewing tests, obtaining and/or reviewing a separately obtained history, performing a medically appropriate examination and/or evaluation , counseling and educating the patient/family/caregiver, ordering medications, tests, or procedures, documenting information in the medical record and independently interpreting results and communicating that information with the patient/family/caregiver  Follow Up   Return if symptoms worsen or fail to improve.  Patient was given instructions and counseling regarding his condition or for health  maintenance advice. Please see specific information pulled into the AVS if appropriate.       This document has been electronically signed by DINA Alfaro  October 13, 2021 15:32 EDT     Patient is here for an urgent care/acute visit.  Patient has an established, non-S Primary Care Provider.

## 2021-10-13 NOTE — PATIENT INSTRUCTIONS
Shortness of Breath, Adult  Shortness of breath is when a person has trouble breathing enough air or when a person feels like she or he is having trouble breathing in enough air. Shortness of breath could be a sign of a medical problem.  Follow these instructions at home:    · Pay attention to any changes in your symptoms.  · Do not use any products that contain nicotine or tobacco, such as cigarettes, e-cigarettes, and chewing tobacco.  · Do not smoke. Smoking is a common cause of shortness of breath. If you need help quitting, ask your health care provider.  · Avoid things that can irritate your airways, such as:  ? Mold.  ? Dust.  ? Air pollution.  ? Chemical fumes.  ? Things that can cause allergy symptoms (allergens), if you have allergies.  · Keep your living space clean and free of mold and dust.  · Rest as needed. Slowly return to your usual activities.  · Take over-the-counter and prescription medicines only as told by your health care provider. This includes oxygen therapy and inhaled medicines.  · Keep all follow-up visits as told by your health care provider. This is important.  Contact a health care provider if:  · Your condition does not improve as soon as expected.  · You have a hard time doing your normal activities, even after you rest.  · You have new symptoms.  Get help right away if:  · Your shortness of breath gets worse.  · You have shortness of breath when you are resting.  · You feel light-headed or you faint.  · You have a cough that is not controlled with medicines.  · You cough up blood.  · You have pain with breathing.  · You have pain in your chest, arms, shoulders, or abdomen.  · You have a fever.  · You cannot walk up stairs or exercise the way that you normally do.  These symptoms may represent a serious problem that is an emergency. Do not wait to see if the symptoms will go away. Get medical help right away. Call your local emergency services (911 in the [place] Do not drive yourself  to the hospital.  Summary  · Shortness of breath is when a person has trouble breathing enough air. It can be a sign of a medical problem.  · Avoid things that irritate your lungs, such as smoking, pollution, mold, and dust.  · Pay attention to changes in your symptoms and contact your health care provider if you have a hard time completing daily activities because of shortness of breath.  This information is not intended to replace advice given to you by your health care provider. Make sure you discuss any questions you have with your health care provider.  Document Revised: 05/20/2019 Document Reviewed: 05/20/2019  [employer] Patient Education © 2021 [employer]    Upper Respiratory Infection, Adult  An upper respiratory infection (URI) is a common viral infection of the nose, throat, and upper air passages that lead to the lungs. The most common type of URI is the common cold. URIs usually get better on their own, without medical treatment.  What are the causes?  A URI is caused by a virus. You may catch a virus by:  · Breathing in droplets from an infected person's cough or sneeze.  · Touching something that has been exposed to the virus (contaminated) and then touching your mouth, nose, or eyes.  What increases the risk?  You are more likely to get a URI if:  · You are very young or very old.  · It is lester or winter.  · You have close contact with others, such as at a , school, or health care facility.  · You smoke.  · You have long-term (chronic) heart or lung disease.  · You have a weakened disease-fighting (immune) system.  · You have nasal allergies or asthma.  · You are experiencing a lot of stress.  · You work in an area that has poor air circulation.  · You have poor nutrition.  What are the signs or symptoms?  A URI usually involves some of the following symptoms:  · Runny or stuffy (congested) nose.  · Sneezing.  · Cough.  · Sore throat.  · Headache.  · Fatigue.  · Fever.  · Loss of  appetite.  · Pain in your forehead, behind your eyes, and over your cheekbones (sinus pain).  · Muscle aches.  · Redness or irritation of the eyes.  · Pressure in the ears or face.  How is this diagnosed?  This condition may be diagnosed based on your medical history and symptoms, and a physical exam. Your health care provider may use a cotton swab to take a mucus sample from your nose (nasal swab). This sample can be tested to determine what virus is causing the illness.  How is this treated?  URIs usually get better on their own within 7-10 days. You can take steps at home to relieve your symptoms. Medicines cannot cure URIs, but your health care provider may recommend certain medicines to help relieve symptoms, such as:  · Over-the-counter cold medicines.  · Cough suppressants. Coughing is a type of defense against infection that helps to clear the respiratory system, so take these medicines only as recommended by your health care provider.  · Fever-reducing medicines.  Follow these instructions at home:  Activity  · Rest as needed.  · If you have a fever, stay home from work or school until your fever is gone or until your health care provider says you are no longer contagious. Your health care provider may have you wear a face mask to prevent your infection from spreading.  Relieving symptoms  · Gargle with a salt-water mixture 3-4 times a day or as needed. To make a salt-water mixture, completely dissolve ½-1 tsp of salt in 1 cup of warm water.  · Use a cool-mist humidifier to add moisture to the air. This can help you breathe more easily.  Eating and drinking    · Drink enough fluid to keep your urine pale yellow.  · Eat soups and other clear broths.    General instructions    · Take over-the-counter and prescription medicines only as told by your health care provider. These include cold medicines, fever reducers, and cough suppressants.  · Do not use any products that contain nicotine or tobacco, such as  cigarettes and e-cigarettes. If you need help quitting, ask your health care provider.  · Stay away from secondhand smoke.  · Stay up to date on all immunizations, including the yearly (annual) flu vaccine.  · Keep all follow-up visits as told by your health care provider. This is important.    How to prevent the spread of infection to others    · URIs can be passed from person to person (are contagious). To prevent the infection from spreading:  ? Wash your hands often with soap and water. If soap and water are not available, use hand .  ? Avoid touching your mouth, face, eyes, or nose.  ? Cough or sneeze into a tissue or your sleeve or elbow instead of into your hand or into the air.    Contact a health care provider if:  · You are getting worse instead of better.  · You have a fever or chills.  · Your mucus is brown or red.  · You have yellow or brown discharge coming from your nose.  · You have pain in your face, especially when you bend forward.  · You have swollen neck glands.  · You have pain while swallowing.  · You have white areas in the back of your throat.  Get help right away if:  · You have shortness of breath that gets worse.  · You have severe or persistent:  ? Headache.  ? Ear pain.  ? Sinus pain.  ? Chest pain.  · You have chronic lung disease along with any of the following:  ? Wheezing.  ? Prolonged cough.  ? Coughing up blood.  ? A change in your usual mucus.  · You have a stiff neck.  · You have changes in your:  ? Vision.  ? Hearing.  ? Thinking.  ? Mood.  Summary  · An upper respiratory infection (URI) is a common infection of the nose, throat, and upper air passages that lead to the lungs.  · A URI is caused by a virus.  · URIs usually get better on their own within 7-10 days.  · Medicines cannot cure URIs, but your health care provider may recommend certain medicines to help relieve symptoms.  This information is not intended to replace advice given to you by your health care  provider. Make sure you discuss any questions you have with your health care provider.  Document Revised: 12/26/2019 Document Reviewed: 08/03/2018  [employer] Patient Education © 2021 [employer]

## 2021-10-14 LAB — SARS-COV-2 RNA NOSE QL NAA+PROBE: NOT DETECTED

## 2021-10-15 ENCOUNTER — TELEPHONE (OUTPATIENT)
Dept: FAMILY MEDICINE CLINIC | Facility: CLINIC | Age: 71
End: 2021-10-15

## 2021-10-15 DIAGNOSIS — J06.9 UPPER RESPIRATORY TRACT INFECTION, UNSPECIFIED TYPE: Primary | ICD-10-CM

## 2021-10-15 RX ORDER — AMOXICILLIN AND CLAVULANATE POTASSIUM 875; 125 MG/1; MG/1
1 TABLET, FILM COATED ORAL 2 TIMES DAILY
Qty: 20 TABLET | Refills: 0 | Status: SHIPPED | OUTPATIENT
Start: 2021-10-15 | End: 2022-01-01 | Stop reason: ALTCHOICE

## 2021-10-15 NOTE — TELEPHONE ENCOUNTER
"Pt's wife called stating Mr. Gabriel has been \"shaking and sweating\" after first two days of azithromycin therapy. Azithromycin d/c'd and Augmentin initiated for upper respiratory infection. Wife notified and verbalizes understanding of education.  "

## 2021-11-04 RX ORDER — POTASSIUM CHLORIDE 750 MG/1
TABLET, EXTENDED RELEASE ORAL
Qty: 30 TABLET | Refills: 2 | Status: SHIPPED | OUTPATIENT
Start: 2021-11-04 | End: 2022-01-01 | Stop reason: SDUPTHER

## 2021-12-01 DIAGNOSIS — E78.2 MIXED HYPERLIPIDEMIA: ICD-10-CM

## 2021-12-01 RX ORDER — ATORVASTATIN CALCIUM 40 MG/1
TABLET, FILM COATED ORAL
Qty: 30 TABLET | Refills: 4 | OUTPATIENT
Start: 2021-12-01

## 2021-12-30 DIAGNOSIS — E78.2 MIXED HYPERLIPIDEMIA: ICD-10-CM

## 2021-12-30 DIAGNOSIS — I10 ESSENTIAL HYPERTENSION: ICD-10-CM

## 2021-12-30 RX ORDER — FUROSEMIDE 40 MG/1
TABLET ORAL
Qty: 30 TABLET | Refills: 8 | Status: SHIPPED | OUTPATIENT
Start: 2021-12-30 | End: 2022-01-01

## 2021-12-30 RX ORDER — ATORVASTATIN CALCIUM 40 MG/1
TABLET, FILM COATED ORAL
Qty: 30 TABLET | Refills: 4 | Status: SHIPPED | OUTPATIENT
Start: 2021-12-30 | End: 2022-01-01

## 2021-12-30 RX ORDER — CLONIDINE HYDROCHLORIDE 0.2 MG/1
TABLET ORAL
Qty: 60 TABLET | Refills: 5 | Status: SHIPPED | OUTPATIENT
Start: 2021-12-30 | End: 2022-01-01

## 2022-01-01 ENCOUNTER — TELEPHONE (OUTPATIENT)
Dept: CARDIOLOGY | Facility: CLINIC | Age: 72
End: 2022-01-01

## 2022-01-01 ENCOUNTER — OFFICE VISIT (OUTPATIENT)
Dept: CARDIOLOGY | Facility: CLINIC | Age: 72
End: 2022-01-01

## 2022-01-01 VITALS
HEIGHT: 67 IN | BODY MASS INDEX: 26.37 KG/M2 | DIASTOLIC BLOOD PRESSURE: 77 MMHG | HEART RATE: 118 BPM | WEIGHT: 168 LBS | OXYGEN SATURATION: 94 % | SYSTOLIC BLOOD PRESSURE: 123 MMHG

## 2022-01-01 DIAGNOSIS — I65.29 STENOSIS OF CAROTID ARTERY, UNSPECIFIED LATERALITY: ICD-10-CM

## 2022-01-01 DIAGNOSIS — E78.2 MIXED HYPERLIPIDEMIA: ICD-10-CM

## 2022-01-01 DIAGNOSIS — I10 ESSENTIAL HYPERTENSION: ICD-10-CM

## 2022-01-01 DIAGNOSIS — I25.10 CORONARY ARTERY DISEASE INVOLVING NATIVE CORONARY ARTERY OF NATIVE HEART WITHOUT ANGINA PECTORIS: ICD-10-CM

## 2022-01-01 DIAGNOSIS — R07.2 PRECORDIAL PAIN: Primary | ICD-10-CM

## 2022-01-01 DIAGNOSIS — I25.119 CORONARY ARTERY DISEASE INVOLVING NATIVE CORONARY ARTERY OF NATIVE HEART WITH ANGINA PECTORIS: Primary | ICD-10-CM

## 2022-01-01 DIAGNOSIS — T50.2X5A DIURETIC-INDUCED HYPOKALEMIA: ICD-10-CM

## 2022-01-01 DIAGNOSIS — E87.6 DIURETIC-INDUCED HYPOKALEMIA: ICD-10-CM

## 2022-01-01 PROCEDURE — 93000 ELECTROCARDIOGRAM COMPLETE: CPT | Performed by: PHYSICIAN ASSISTANT

## 2022-01-01 PROCEDURE — 99214 OFFICE O/P EST MOD 30 MIN: CPT | Performed by: PHYSICIAN ASSISTANT

## 2022-01-01 RX ORDER — NITROGLYCERIN 0.4 MG/1
0.4 TABLET SUBLINGUAL
Qty: 25 TABLET | Refills: 1 | Status: SHIPPED | OUTPATIENT
Start: 2022-01-01 | End: 2022-01-01

## 2022-01-01 RX ORDER — POTASSIUM CHLORIDE 750 MG/1
TABLET, EXTENDED RELEASE ORAL
Qty: 30 TABLET | Refills: 0 | Status: SHIPPED | OUTPATIENT
Start: 2022-01-01 | End: 2022-01-01

## 2022-01-01 RX ORDER — ATORVASTATIN CALCIUM 40 MG/1
TABLET, FILM COATED ORAL
Qty: 30 TABLET | Refills: 1 | Status: SHIPPED | OUTPATIENT
Start: 2022-01-01

## 2022-01-01 RX ORDER — CLONIDINE HYDROCHLORIDE 0.2 MG/1
0.2 TABLET ORAL 2 TIMES DAILY
Qty: 180 TABLET | Refills: 3 | Status: SHIPPED | OUTPATIENT
Start: 2022-01-01

## 2022-01-01 RX ORDER — POTASSIUM CHLORIDE 750 MG/1
TABLET, EXTENDED RELEASE ORAL
Qty: 30 TABLET | Refills: 0 | OUTPATIENT
Start: 2022-01-01

## 2022-01-01 RX ORDER — ATORVASTATIN CALCIUM 40 MG/1
TABLET, FILM COATED ORAL
Qty: 30 TABLET | Refills: 1 | Status: SHIPPED | OUTPATIENT
Start: 2022-01-01 | End: 2022-01-01

## 2022-01-01 RX ORDER — POTASSIUM CHLORIDE 750 MG/1
10 TABLET, EXTENDED RELEASE ORAL DAILY
Qty: 90 TABLET | Refills: 0 | Status: SHIPPED | OUTPATIENT
Start: 2022-01-01 | End: 2022-01-01

## 2022-01-01 RX ORDER — POTASSIUM CHLORIDE 750 MG/1
TABLET, EXTENDED RELEASE ORAL
Qty: 30 TABLET | Refills: 2 | OUTPATIENT
Start: 2022-01-01

## 2022-01-01 RX ORDER — POTASSIUM CHLORIDE 750 MG/1
TABLET, EXTENDED RELEASE ORAL
Qty: 30 TABLET | Refills: 5 | Status: SHIPPED | OUTPATIENT
Start: 2022-01-01 | End: 2023-01-01

## 2022-01-01 RX ORDER — CLOPIDOGREL BISULFATE 75 MG/1
75 TABLET ORAL DAILY
Qty: 90 TABLET | Refills: 0 | Status: SHIPPED | OUTPATIENT
Start: 2022-01-01 | End: 2022-01-01 | Stop reason: SDUPTHER

## 2022-01-01 RX ORDER — CLOPIDOGREL BISULFATE 75 MG/1
75 TABLET ORAL DAILY
Qty: 90 TABLET | Refills: 2 | Status: SHIPPED | OUTPATIENT
Start: 2022-01-01

## 2022-01-01 RX ORDER — FUROSEMIDE 40 MG/1
TABLET ORAL
Qty: 30 TABLET | Refills: 8 | Status: SHIPPED | OUTPATIENT
Start: 2022-01-01

## 2022-01-01 RX ORDER — CLONIDINE HYDROCHLORIDE 0.2 MG/1
TABLET ORAL
Qty: 60 TABLET | Refills: 2 | Status: SHIPPED | OUTPATIENT
Start: 2022-01-01 | End: 2022-01-01

## 2022-01-01 RX ORDER — CLONIDINE HYDROCHLORIDE 0.2 MG/1
TABLET ORAL
Qty: 60 TABLET | Refills: 2 | Status: SHIPPED | OUTPATIENT
Start: 2022-01-01 | End: 2022-01-01 | Stop reason: SDUPTHER

## 2022-01-01 RX ORDER — AMLODIPINE BESYLATE 10 MG/1
10 TABLET ORAL DAILY
Qty: 30 TABLET | Refills: 5 | Status: SHIPPED | OUTPATIENT
Start: 2022-01-01

## 2022-01-01 RX ORDER — NITROGLYCERIN 0.4 MG/1
TABLET SUBLINGUAL
Qty: 25 TABLET | Refills: 1 | Status: SHIPPED | OUTPATIENT
Start: 2022-01-01 | End: 2022-01-01

## 2022-01-01 RX ORDER — NITROGLYCERIN 0.4 MG/1
TABLET SUBLINGUAL
Qty: 25 TABLET | Refills: 1 | Status: SHIPPED | OUTPATIENT
Start: 2022-01-01

## 2022-01-01 RX ORDER — CLOPIDOGREL BISULFATE 75 MG/1
TABLET ORAL
Qty: 30 TABLET | Refills: 5 | OUTPATIENT
Start: 2022-01-01

## 2022-05-17 NOTE — TELEPHONE ENCOUNTER
Refills to to [facility]. Follow up scheduled in July. Patient has been having treatment for cancer and unable to keep other appointment. Loretta Lyle MA

## 2022-08-12 NOTE — TELEPHONE ENCOUNTER
Requested Prescriptions     Pending Prescriptions Disp Refills   • potassium chloride (K-DUR,KLOR-CON) 10 MEQ CR tablet [Pharmacy Med Name: POTASSIUM CHLORIDE ER 10MG 10 Tablet] 30 tablet 0     Sig: TAKE ONE TABLET BY MOUTH EVERY DAY

## 2022-08-18 NOTE — TELEPHONE ENCOUNTER
Patient's wife called for refill of Clonidine. Pended order from 8/9/22 not signed. Loretta Lyle MA

## 2022-09-07 NOTE — TELEPHONE ENCOUNTER
Rx Refill Note  Requested Prescriptions     Pending Prescriptions Disp Refills   • atorvastatin (LIPITOR) 40 MG tablet [Pharmacy Med Name: ATORVASTATIN CALCIUM 40 MG 40 Tablet] 30 tablet 1     Sig: TAKE ONE TABLET BY MOUTH EVERY DAY FOR CHOLESTEROL      Last office visit with prescribing clinician: 2/23/2021      Next office visit with prescribing clinician: 9/14/2022            Oma Hay LPN  09/07/22, 16:15 EDT      [facility]-CoA Reductase Inhibitors (Statins) Protocol Failed 09/07/2022 03:02 PM   Protocol Details  Lipid panel in past 12 months    ALK [facility] in past 12 months    ALT in past 12 months    AST in past 12 months

## 2022-09-14 NOTE — PROGRESS NOTES
Problem list     Subjective   Aly Gabriel III is a 72 y.o. male     Chief Complaint   Patient presents with   • Coronary Artery Disease   • Carotid bruit   • Shortness of Breath   Problem list     1. Three-vessel coronary artery disease  1.1 cardiac catheterization May 2017 because of inferobasal, diaphragmatic, and distal lateral ischemia demonstrating severe three-vessel disease with recommendations for mechanical revascularization.   1.2 Coronary artery bypass grafting with LIMA to LAD, vein graft to OM, vein graft to PDA and posterolateral branch by Dr. Roanl Anthony June 2017   1.3 stress test April 2018 demonstrates no evidence of ischemia and preserved LV function  1.4 cardiac catheterization May 2018 with patent LIMA and vein graft to circumflex with 90% disease in the vein graft to the RCA.  Stenting performed and nonobstructive disease otherwise normal  1.5 stress test May 2020 suggest no evidence of ischemia with mild apical septal hypokinesis with preserved LV function  2. Preserved systolic function  2.1 Echo 8/7/17-mild LVH, EF 50-55%, diastolic dysfunction 2, basal inferoseptal, and apical septal wall segments are hypokinetic, mid inferoseptal wall segment is akinetic, mild MR, trace TR and AR, PA 30-35  3. 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 Hypertension  5. Dyslipidemia history.   6.  Mild mitral and tricuspid regurgitation    HPI    Patient is a 72-year-old male who presents back to the office for follow-up.  Patient has not been seen in over a year.  Unfortunately, since his last evaluation, he apparently was diagnosed with some type of pancreatic mass.    History from the patient is limited but he cannot communicate with  nonverbal cues.  His previous CVA is lifting with a degree of dysarthria.    He has been having intermittent chest pain.  She describes that she has given him a nitroglycerin at times.  This only happens on occasion.  Dyspnea is mild but does not have any progressive shortness of breath.  No complaints of PND orthopnea.    Patient does not complain of palpitating nor dysrhythmic symptoms.  He apparently has lower abdominal and lower back pain but is otherwise stable          Current Outpatient Medications on File Prior to Visit   Medication Sig Dispense Refill   • atorvastatin (LIPITOR) 40 MG tablet TAKE ONE TABLET BY MOUTH EVERY DAY FOR CHOLESTEROL 30 tablet 1   • cetirizine (zyrTEC) 10 MG tablet Take 1 tablet by mouth Daily. 30 tablet 2   • cloNIDine (CATAPRES) 0.2 MG tablet Take 1 tablet by mouth 2 (Two) Times a Day. 180 tablet 3   • clopidogrel (PLAVIX) 75 MG tablet Take 1 tablet by mouth Daily. 90 tablet 0   • fluticasone (Flonase) 50 MCG/ACT nasal spray 2 sprays into the nostril(s) as directed by provider Daily. 15.8 mL 0   • furosemide (LASIX) 40 MG tablet TAKE ONE TABLET BY MOUTH EVERY DAY FOR FLUID RETENTION 30 tablet 8   • HYDROcodone-acetaminophen (NORCO) 7.5-325 MG per tablet Take 1 tablet by mouth 3 (Three) Times a Day.     • levETIRAcetam (KEPPRA) 500 MG tablet 500 mg 2 (Two) Times a Day.     • lisinopril (PRINIVIL,ZESTRIL) 20 MG tablet Take 1 tablet by mouth 2 (Two) Times a Day. 180 tablet 1   • metoprolol tartrate (LOPRESSOR) 25 MG tablet Daily.     • nitroglycerin (NITROSTAT) 0.4 MG SL tablet Place 1 tablet under the tongue Every 5 (Five) Minutes As Needed for Chest Pain (chest pain). 25 tablet 1   • omeprazole (priLOSEC) 20 MG capsule Daily.     • potassium chloride (K-DUR,KLOR-CON) 10 MEQ CR tablet TAKE ONE TABLET BY MOUTH EVERY DAY 30 tablet 0   • promethazine (PHENERGAN) 25 MG tablet      • VENTOLIN  (90 Base) MCG/ACT inhaler As Needed.     • [DISCONTINUED] amLODIPine (NORVASC) 5 MG  tablet Take 1 tablet by mouth 2 (Two) Times a Day. 60 tablet 5   • [DISCONTINUED] amoxicillin-clavulanate (Augmentin) 875-125 MG per tablet Take 1 tablet by mouth 2 (Two) Times a Day. 20 tablet 0   • [DISCONTINUED] megestrol (MEGACE) 40 MG tablet      • [DISCONTINUED] metaxalone (SKELAXIN) 800 MG tablet Take 1 tablet by mouth At Night As Needed for Muscle Spasms (or cramps). 30 tablet 5   • [DISCONTINUED] metoclopramide (REGLAN) 10 MG tablet Daily.     • [DISCONTINUED] NYSTATIN PO Take  by mouth.     • [DISCONTINUED] pantoprazole (PROTONIX) 40 MG EC tablet Take 1 tablet by mouth Daily. 30 tablet 6   • [DISCONTINUED] potassium chloride (K-DUR) 10 MEQ CR tablet TAKE ONE TABLET BY MOUTH DAILY 30 tablet 5   • [DISCONTINUED] ranolazine (RANEXA) 1000 MG 12 hr tablet Take 1 tablet by mouth Every 12 (Twelve) Hours. 60 tablet 6   • [DISCONTINUED] tamsulosin (FLOMAX) 0.4 MG capsule 24 hr capsule        Current Facility-Administered Medications on File Prior to Visit   Medication Dose Route Frequency Provider Last Rate Last Admin   • Chlorhexidine Gluconate Cloth 2 % pads 1 application  1 application Topical Q12H PRN Aime Lancaster PA           Patient has no known allergies.    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   • Wears glasses    • Wears partial dentures        Social History     Socioeconomic History   • Marital status:    • Number of children: 0   Tobacco Use   • Smoking status: Former Smoker     Packs/day: 2.00     Years: 30.00     Pack years: 60.00     Types: Cigarettes     Quit date:  "2014     Years since quittin.2   • Smokeless tobacco: Never Used   Substance and Sexual Activity   • Alcohol use: No   • Drug use: No   • Sexual activity: Yes     Partners: Female     Comment: spouse       Family History   Problem Relation Age of Onset   • Heart disease Mother    • Heart disease Father        Review of Systems   Constitutional: Negative.  Negative for chills and fever.   HENT: Negative.  Negative for congestion and sinus pressure.    Respiratory: Positive for shortness of breath. Negative for chest tightness.    Cardiovascular: Positive for chest pain, palpitations (races) and leg swelling.   Gastrointestinal: Positive for constipation (at times), nausea and vomiting. Negative for abdominal pain, blood in stool and diarrhea.   Genitourinary: Negative.  Negative for dysuria, frequency, hematuria and urgency.   Neurological: Positive for dizziness (sitting). Negative for syncope and light-headedness.   Hematological: Bruises/bleeds easily.   Psychiatric/Behavioral: Negative.  Negative for sleep disturbance (denies waking with soa or cp).       Objective   Vitals:    22 1549   BP: 123/77   BP Location: Left arm   Patient Position: Sitting   Pulse: 118   SpO2: 94%   Weight: 76.2 kg (168 lb)   Height: 170.2 cm (67\")      /77 (BP Location: Left arm, Patient Position: Sitting)   Pulse 118   Ht 170.2 cm (67\")   Wt 76.2 kg (168 lb)   SpO2 94%   BMI 26.31 kg/m²     Lab Results (most recent)     None          Physical Exam  Vitals and nursing note reviewed.   Constitutional:       General: He is not in acute distress.     Appearance: Normal appearance. He is well-developed.   HENT:      Head: Normocephalic and atraumatic.   Eyes:      General: No scleral icterus.        Right eye: No discharge.         Left eye: No discharge.      Conjunctiva/sclera: Conjunctivae normal.   Neck:      Vascular: No carotid bruit.   Cardiovascular:      Rate and Rhythm: Normal rate and regular rhythm.    "   Heart sounds: Normal heart sounds. No murmur heard.    No friction rub. No gallop.   Pulmonary:      Effort: Pulmonary effort is normal. No respiratory distress.      Breath sounds: Normal breath sounds. No wheezing or rales.   Chest:      Chest wall: No tenderness.   Musculoskeletal:      Right lower leg: No edema.      Left lower leg: No edema.   Skin:     General: Skin is warm and dry.      Coloration: Skin is not pale.      Findings: No erythema or rash.   Neurological:      Mental Status: He is alert and oriented to person, place, and time.      Cranial Nerves: No cranial nerve deficit.   Psychiatric:         Behavior: Behavior normal.         Procedure     ECG 12 Lead    Date/Time: 9/14/2022 3:56 PM  Performed by: Delta Shah PA  Authorized by: Delta Shah PA   Comparison: compared with previous ECG from 10/17/2019  Comments: EKG demonstrates sinus tachycardia at 111 bpm with inferior wall monitor determined and no acute ST changes               Assessment & Plan     Problems Addressed this Visit        Cardiac and Vasculature    Coronary artery disease (S/P CABGx4 on 6/28/17) - Primary    Relevant Medications    amLODIPine (NORVASC) 10 MG tablet    Essential hypertension    Relevant Medications    amLODIPine (NORVASC) 10 MG tablet    Stenosis of carotid artery      Diagnoses       Codes Comments    Coronary artery disease involving native coronary artery of native heart with angina pectoris (HCC)    -  Primary ICD-10-CM: I25.119  ICD-9-CM: 414.01, 413.9     Stenosis of carotid artery, unspecified laterality     ICD-10-CM: I65.29  ICD-9-CM: 433.10     Essential hypertension     ICD-10-CM: I10  ICD-9-CM: 401.9         Recommendation  1.  Patient is a 72-year-old male who presents back to the office for follow-up with history of coronary disease.  He is having chest pain.  I discussed stress testing with the patient's wife.  For now, they will monitor symptoms.  I am concerned that he takes  nitroglycerin to resolve chest pain and offered testing.  However, she felt it could be related to noncardiac issues.  If symptoms were to progress, they will call the office.  This apparently only happens on occasion    2.  Patient with carotid disease but no obstructive disease by last evaluation and for now we will continue medical therapy    3.  Patient with baseline hypertension.  She describes having to take 4 0.2 mg clonidine's daily to help with his blood pressure.  He apparently was off of his amlodipine.  I am starting him back on 10 mg of amlodipine at night and only him taking clonidine twice a day.  I want him to call back in 1 week of blood pressure readings as we can make further adjustments    4.  I want to see them back in a few months.  If symptoms were to worsen or chest pain worsens, want them to call the office.  Any chest pain, not resolved with nitroglycerin, I would recommend ER evaluation.  We will see patient back for follow-up as discussed.  Follow-up with primary as scheduled             Aly Gabriel III  reports that he quit smoking about 8 years ago. His smoking use included cigarettes. He has a 60.00 pack-year smoking history. He has never used smokeless tobacco..      Patient did not bring med list or medicine bottles to appointment, med list has been reviewed and updated based on patient's knowledge of their meds.     Advance Care Planning   ACP discussion was held with the patient during this visit. Patient does not have an advance directive, information provided.         Electronically signed by:

## 2022-12-12 NOTE — TELEPHONE ENCOUNTER
Name from pharmacy: POTASSIUM CHLORIDE ER 10MG 10 Tablet          Will file in chart as: potassium chloride (K-DUR,KLOR-CON) 10 MEQ CR tablet    Sig: TAKE ONE TABLET BY MOUTH EVERY DAY FOR POTASSIUM REPLACEMENT    Disp:  30 tablet    Refills:  0    Start: 12/9/2022    Class: Normal    Non-formulary    Last ordered: 2 months ago by JE Blount Last refill: 10/15/2022    Rx #: [id number]    Potassium Supplement Protocol Failed 12/09/2022 02:14 PM   Protocol Details  Normal serum potassium on file in past 12 months    Recent or future appt with prescriber (12MO/90D)

## 2023-01-01 DIAGNOSIS — E87.6 DIURETIC-INDUCED HYPOKALEMIA: ICD-10-CM

## 2023-01-01 DIAGNOSIS — T50.2X5A DIURETIC-INDUCED HYPOKALEMIA: ICD-10-CM

## 2023-01-01 RX ORDER — POTASSIUM CHLORIDE 750 MG/1
TABLET, EXTENDED RELEASE ORAL
Qty: 30 TABLET | Refills: 0 | Status: SHIPPED | OUTPATIENT
Start: 2023-01-01

## 2024-09-23 LAB
MAXIMAL PREDICTED HEART RATE: 152 BPM
STRESS TARGET HR: 129 BPM

## (undated) DEVICE — Device

## (undated) DEVICE — VASOVIEW HEMOPRO: Brand: VASOVIEW HEMOPRO

## (undated) DEVICE — WIPE THERAWASH SLV SPEC CARE 2PK

## (undated) DEVICE — NDL PERC 1PRT THNWALL W/BASEPLT 18G 7CM

## (undated) DEVICE — MEDI-VAC NON-CONDUCTIVE SUCTION TUBING: Brand: CARDINAL HEALTH

## (undated) DEVICE — SUT PROLN 7/0 CV BV1 24IN 8304H BX/36

## (undated) DEVICE — TEMP PACING WIRE: Brand: MYO/WIRE

## (undated) DEVICE — 2000CC GUARDIAN II: Brand: GUARDIAN

## (undated) DEVICE — SENSR CERBRL O2 SOMASENSOR ADHS A/ LF

## (undated) DEVICE — CANN VESL DLP 1WY BLNT/TP 3MM

## (undated) DEVICE — FLTR GAS LN OXYGENATOR 1/4IN STRL

## (undated) DEVICE — PAD LVL SENSR MOUNTING

## (undated) DEVICE — SUT PROLN 4/0 RB1 D/A 36IN 8557H

## (undated) DEVICE — Device: Brand: MEDEX

## (undated) DEVICE — TUBING, SUCTION, 1/4" X 10', STRAIGHT: Brand: MEDLINE

## (undated) DEVICE — 32 FR STRAIGHT – SOFT PVC CATHETER: Brand: PVC THORACIC CATHETERS

## (undated) DEVICE — SUT PROLN 4/0 SH D/A 36IN 8521H

## (undated) DEVICE — OASIS DRAIN, SINGLE, INLINE & ATS COMPATIBLE: Brand: OASIS

## (undated) DEVICE — 12 FOOT DISPOSABLE EXTENSION CABLE WITH SAFE CONNECT / SCREW-DOWN

## (undated) DEVICE — SOL NS 500ML

## (undated) DEVICE — ANTIBACTERIAL UNDYED BRAIDED (POLYGLACTIN 910), SYNTHETIC ABSORBABLE SUTURE: Brand: COATED VICRYL

## (undated) DEVICE — CANN AORT ROOT DLP VNT 14G 7F

## (undated) DEVICE — SOL NACL 0.9PCT 1000ML

## (undated) DEVICE — SUT PROLN 6/0 C1 D/A 30IN 8706H

## (undated) DEVICE — BG TRANSFW COUPLER SPK 1000ML

## (undated) DEVICE — AVANTI + 4F STD W/GW: Brand: AVANTI

## (undated) DEVICE — 3M™ TEGADERM™ TRANSPARENT FILM DRESSING FIRST AID STYLE, 1621, 4 IN X 4-3/4 IN, 50 BAGS/CARTON, 4 CARTONS/CASE: Brand: 3M™ TEGADERM™

## (undated) DEVICE — TBG SXN RIGD MINI/SUCKER 9F 4.75IN

## (undated) DEVICE — SUT PDS 1 CTX 36IN VIO PDP371T

## (undated) DEVICE — SUT PROLN 3/0 SH D/A 36IN 8522H

## (undated) DEVICE — TOWEL,OR,DSP,ST,BLUE,STD,8/PK,10PK/CS: Brand: MEDLINE

## (undated) DEVICE — PK SPECIALTYCARE M/STATE 1 OH 1/2V CUST

## (undated) DEVICE — AIRWY SZ11

## (undated) DEVICE — PRESSURE MONITORING ACCESSORY: Brand: TRUWAVE

## (undated) DEVICE — SUT SILK 4/0 TIES 18IN A183H

## (undated) DEVICE — SYS SKIN CLS DERMABOND PRINEO W/22CM MESH TP

## (undated) DEVICE — 32 FR RIGHT ANGLE – SOFT PVC CATHETER: Brand: PVC THORACIC CATHETERS

## (undated) DEVICE — INTRAOPERATIVE COVER KIT, 10 PACK: Brand: SITE-RITE

## (undated) DEVICE — PRESSURE MONITORING SET: Brand: TRUWAVE, VAMP

## (undated) DEVICE — CONN REDUCING CANN/PUMP 3/8X3/8X3/8

## (undated) DEVICE — MEDI-VAC YANKAUER SUCTION HANDLE W/BULBOUS TIP: Brand: CARDINAL HEALTH

## (undated) DEVICE — SUT SILK 0/0 CT2 18IN C027D

## (undated) DEVICE — PK HEART OPN 10

## (undated) DEVICE — SUT SILK 2 SUTUPAK TIE 60IN SA8H 2STRAND